# Patient Record
Sex: FEMALE | Race: WHITE | NOT HISPANIC OR LATINO | Employment: FULL TIME | URBAN - NONMETROPOLITAN AREA
[De-identification: names, ages, dates, MRNs, and addresses within clinical notes are randomized per-mention and may not be internally consistent; named-entity substitution may affect disease eponyms.]

---

## 2017-02-01 ENCOUNTER — OFFICE VISIT - GICH (OUTPATIENT)
Dept: FAMILY MEDICINE | Facility: OTHER | Age: 27
End: 2017-02-01

## 2017-02-01 ENCOUNTER — HISTORY (OUTPATIENT)
Dept: FAMILY MEDICINE | Facility: OTHER | Age: 27
End: 2017-02-01

## 2017-02-01 DIAGNOSIS — J06.9 ACUTE UPPER RESPIRATORY INFECTION: ICD-10-CM

## 2017-02-01 DIAGNOSIS — B97.89 OTHER VIRAL AGENTS AS THE CAUSE OF DISEASES CLASSIFIED ELSEWHERE: ICD-10-CM

## 2017-02-01 DIAGNOSIS — J02.9 ACUTE PHARYNGITIS: ICD-10-CM

## 2017-02-01 LAB — STREP A ANTIGEN - HISTORICAL: NEGATIVE

## 2017-06-19 ENCOUNTER — HISTORY (OUTPATIENT)
Dept: EMERGENCY MEDICINE | Facility: OTHER | Age: 27
End: 2017-06-19

## 2017-06-20 ENCOUNTER — AMBULATORY - GICH (OUTPATIENT)
Dept: SCHEDULING | Facility: OTHER | Age: 27
End: 2017-06-20

## 2017-06-23 ENCOUNTER — AMBULATORY - GICH (OUTPATIENT)
Dept: SCHEDULING | Facility: OTHER | Age: 27
End: 2017-06-23

## 2017-07-28 ENCOUNTER — PRENATAL OFFICE VISIT - GICH (OUTPATIENT)
Dept: OBGYN | Facility: OTHER | Age: 27
End: 2017-07-28

## 2017-07-28 ENCOUNTER — HISTORY (OUTPATIENT)
Dept: OBGYN | Facility: OTHER | Age: 27
End: 2017-07-28

## 2017-07-28 DIAGNOSIS — Z34.80 ENCOUNTER FOR SUPERVISION OF OTHER NORMAL PREGNANCY, UNSPECIFIED TRIMESTER: ICD-10-CM

## 2017-07-28 DIAGNOSIS — Z12.4 ENCOUNTER FOR SCREENING FOR MALIGNANT NEOPLASM OF CERVIX: ICD-10-CM

## 2017-09-01 ENCOUNTER — PRENATAL OFFICE VISIT - GICH (OUTPATIENT)
Dept: OBGYN | Facility: OTHER | Age: 27
End: 2017-09-01

## 2017-09-01 ENCOUNTER — HISTORY (OUTPATIENT)
Dept: OBGYN | Facility: OTHER | Age: 27
End: 2017-09-01

## 2017-09-01 DIAGNOSIS — Z34.80 ENCOUNTER FOR SUPERVISION OF OTHER NORMAL PREGNANCY, UNSPECIFIED TRIMESTER: ICD-10-CM

## 2017-09-01 DIAGNOSIS — K04.7 PERIAPICAL ABSCESS WITHOUT SINUS: ICD-10-CM

## 2017-09-14 ENCOUNTER — HOSPITAL ENCOUNTER (OUTPATIENT)
Dept: RADIOLOGY | Facility: OTHER | Age: 27
End: 2017-09-14
Attending: OBSTETRICS & GYNECOLOGY

## 2017-09-14 DIAGNOSIS — Z34.80 ENCOUNTER FOR SUPERVISION OF OTHER NORMAL PREGNANCY, UNSPECIFIED TRIMESTER: ICD-10-CM

## 2017-09-26 ENCOUNTER — PRENATAL OFFICE VISIT - GICH (OUTPATIENT)
Dept: OBGYN | Facility: OTHER | Age: 27
End: 2017-09-26

## 2017-09-26 ENCOUNTER — HISTORY (OUTPATIENT)
Dept: OBGYN | Facility: OTHER | Age: 27
End: 2017-09-26

## 2017-09-26 DIAGNOSIS — B00.9 HERPESVIRAL INFECTION: ICD-10-CM

## 2017-09-26 DIAGNOSIS — Z34.80 ENCOUNTER FOR SUPERVISION OF OTHER NORMAL PREGNANCY, UNSPECIFIED TRIMESTER: ICD-10-CM

## 2017-09-26 LAB
GLU GEST SCREEN 1HR 50G: 93 MG/DL (ref 65–139)
HEMOGLOBIN: 11.4 G/DL (ref 12–16)
MCV RBC AUTO: 86 FL (ref 80–100)

## 2017-09-27 ENCOUNTER — COMMUNICATION - GICH (OUTPATIENT)
Dept: OBGYN | Facility: OTHER | Age: 27
End: 2017-09-27

## 2017-09-27 LAB — TREPONEMA PALLIDUM - HISTORICAL: NEGATIVE

## 2017-10-17 ENCOUNTER — HISTORY (OUTPATIENT)
Dept: OBGYN | Facility: OTHER | Age: 27
End: 2017-10-17

## 2017-10-17 ENCOUNTER — PRENATAL OFFICE VISIT - GICH (OUTPATIENT)
Dept: OBGYN | Facility: OTHER | Age: 27
End: 2017-10-17

## 2017-10-17 DIAGNOSIS — Z34.82 ENCOUNTER FOR SUPERVISION OF OTHER NORMAL PREGNANCY, SECOND TRIMESTER: ICD-10-CM

## 2017-10-17 DIAGNOSIS — F41.9 ANXIETY DISORDER: ICD-10-CM

## 2017-11-10 ENCOUNTER — HISTORY (OUTPATIENT)
Dept: EMERGENCY MEDICINE | Facility: OTHER | Age: 27
End: 2017-11-10

## 2017-11-10 ENCOUNTER — COMMUNICATION - GICH (OUTPATIENT)
Dept: OBGYN | Facility: OTHER | Age: 27
End: 2017-11-10

## 2017-11-13 ENCOUNTER — COMMUNICATION - GICH (OUTPATIENT)
Dept: LAB | Facility: OTHER | Age: 27
End: 2017-11-13

## 2017-11-13 DIAGNOSIS — Z34.83 ENCOUNTER FOR SUPERVISION OF OTHER NORMAL PREGNANCY, THIRD TRIMESTER: ICD-10-CM

## 2017-11-21 ENCOUNTER — HISTORY (OUTPATIENT)
Dept: OBGYN | Facility: OTHER | Age: 27
End: 2017-11-21

## 2017-11-21 ENCOUNTER — AMBULATORY - GICH (OUTPATIENT)
Dept: LAB | Facility: OTHER | Age: 27
End: 2017-11-21

## 2017-11-21 ENCOUNTER — PRENATAL OFFICE VISIT - GICH (OUTPATIENT)
Dept: OBGYN | Facility: OTHER | Age: 27
End: 2017-11-21

## 2017-11-21 DIAGNOSIS — Z34.83 ENCOUNTER FOR SUPERVISION OF OTHER NORMAL PREGNANCY, THIRD TRIMESTER: ICD-10-CM

## 2017-11-21 LAB
GLU GEST SCREEN 1HR 50G: 105 MG/DL (ref 65–139)
HEMOGLOBIN: 11.1 G/DL (ref 12–16)
MCV RBC AUTO: 86 FL (ref 80–100)

## 2017-11-22 ENCOUNTER — COMMUNICATION - GICH (OUTPATIENT)
Dept: OBGYN | Facility: OTHER | Age: 27
End: 2017-11-22

## 2017-11-23 LAB — TREPONEMA PALLIDUM - HISTORICAL: NEGATIVE

## 2017-12-01 ENCOUNTER — HISTORY (OUTPATIENT)
Dept: EMERGENCY MEDICINE | Facility: OTHER | Age: 27
End: 2017-12-01

## 2017-12-07 ENCOUNTER — HISTORY (OUTPATIENT)
Dept: OBGYN | Facility: OTHER | Age: 27
End: 2017-12-07

## 2017-12-07 ENCOUNTER — PRENATAL OFFICE VISIT - GICH (OUTPATIENT)
Dept: OBGYN | Facility: OTHER | Age: 27
End: 2017-12-07

## 2017-12-07 DIAGNOSIS — N76.0 ACUTE VAGINITIS: ICD-10-CM

## 2017-12-07 DIAGNOSIS — B37.31 CANDIDIASIS OF VULVA AND VAGINA: ICD-10-CM

## 2017-12-07 DIAGNOSIS — B00.1 HERPESVIRAL VESICULAR DERMATITIS: ICD-10-CM

## 2017-12-07 DIAGNOSIS — B96.89 OTHER SPECIFIED BACTERIAL AGENTS AS THE CAUSE OF DISEASES CLASSIFIED ELSEWHERE: ICD-10-CM

## 2017-12-17 ENCOUNTER — HEALTH MAINTENANCE LETTER (OUTPATIENT)
Age: 27
End: 2017-12-17

## 2017-12-19 ENCOUNTER — HISTORY (OUTPATIENT)
Dept: OBGYN | Facility: OTHER | Age: 27
End: 2017-12-19

## 2017-12-19 ENCOUNTER — PRENATAL OFFICE VISIT - GICH (OUTPATIENT)
Dept: OBGYN | Facility: OTHER | Age: 27
End: 2017-12-19

## 2017-12-19 DIAGNOSIS — Z34.93 ENCOUNTER FOR SUPERVISION OF NORMAL PREGNANCY IN THIRD TRIMESTER: ICD-10-CM

## 2017-12-26 ENCOUNTER — PRENATAL OFFICE VISIT - GICH (OUTPATIENT)
Dept: OBGYN | Facility: OTHER | Age: 27
End: 2017-12-26

## 2017-12-26 ENCOUNTER — HISTORY (OUTPATIENT)
Dept: OBGYN | Facility: OTHER | Age: 27
End: 2017-12-26

## 2017-12-26 DIAGNOSIS — N89.8 OTHER SPECIFIED NONINFLAMMATORY DISORDERS OF VAGINA (CODE): ICD-10-CM

## 2017-12-26 DIAGNOSIS — Z34.83 ENCOUNTER FOR SUPERVISION OF OTHER NORMAL PREGNANCY, THIRD TRIMESTER: ICD-10-CM

## 2017-12-26 DIAGNOSIS — R21 RASH AND OTHER NONSPECIFIC SKIN ERUPTION: ICD-10-CM

## 2017-12-27 ENCOUNTER — COMMUNICATION - GICH (OUTPATIENT)
Dept: OBGYN | Facility: OTHER | Age: 27
End: 2017-12-27

## 2017-12-27 NOTE — PROGRESS NOTES
Patient Information     Patient Name MRN Sex     Alexy Carrion [id number] Female 1990      Progress Notes by Bobby Leung MD at 2017  9:45 AM     Author:  Bobby Leung MD Service:  (none) Author Type:  Physician     Filed:  2017 12:35 PM Encounter Date:  2017 Status:  Signed     :  Bobby Leung MD (Physician)            PRENATAL VISIT   FIRST OBSTETRICAL EXAM - OB     Alexy Carrion is a 26 y.o. female, G 4, P 3004, is here today for her First Obstetrical Exam. Ethnicity: /White                      OB History                    Para  Term     AB  Living     4   3  3         4     SAB   TAB  Ectopic  Multiple   Live Births                 1   4                         # Outcome Date  GA  Lbr Jin/2nd  Weight Sex  Delivery Anes PTL Lv   4 Current                         3A Term 14  37w1d       M  Vag EPIDURAL   SHRUTI   3B Term 14  37w1d       F  Vag-Breech EPIDURAL   SHRUTI   2 Term 10/2009     01:00  3.289 kg (7 lb 4 oz) M  Vag     SHRUTI   1 Term 2008     03:00  4.082 kg (9 lb) F  Vag     SHRUTI                           18w2d    OB Problem List:   x 3  Psychosocial issues    A Rh Positive  Flu Shot:tbd  GCT:tbd  Tdap:tbd  Syphilis: neg  GBS:tbd    Immunization History     Administered  Date(s) Administered     AMB Influenza, IIV3 (Age >=3 years)(Flu Clinic Only) 2013     Hepatitis B, Unspecified 10/03/2001     MMR, Unspecified 1991, 10/29/1999     Polio Virus, Unspecified 1989, 1990, 1991, 1995     Tdap 2014     Tdap, Unspecified 1990     Varicella Vaccine 2001       CC: Recheck OB visit at 18w2d    HPI: Alexy Carrion presents for a routine OB visit now at 18w2d  She has no concerns. Active baby. Denies cramping, bleeding, normal fetal movement    Current Outpatient Prescriptions on File Prior to Visit       Medication  Sig Dispense Refill     EPIPEN 2-GUNNAR 0.3 mg/0.3 mL (1:1,000) injection  INJECT 0.3 MG INTO THE MUSCLE ONE TIME IF NEEDED FOR ALLERGIC REACTION FOR UPTO ONE DOSE. 2 Each 0     meclizine (ANTIVERT) 12.5 mg tablet Take 1-2 tablets every 8 hours as needed for nausea. 50 tablet 0     multivit with min-folic acid (DAILY GUMMIES) 200 mcg chew Take  by mouth.  0     pyridoxine, vitamin B6, (VITAMIN B6) 100 mg tablet Take 1 tablet by mouth once daily. 30 tablet 0     No current facility-administered medications on file prior to visit.      REVIEW OF SYSTEMS:  Social History Narrative    No smoking.  One daughter and one son;  New significant other Smith    Preload  8/26/2013                  No family history on file.    O:   /64  Pulse 76  Wt 110.5 kg (243 lb 9.6 oz)  LMP 05/04/2017  BMI 37.04 kg/m2  Body mass index is 37.04 kg/(m^2).    See OB flow sheet   EXAM:  General Appearance: Pleasant, alert, appropriate appearance for age. No acute distress    Results for orders placed or performed in visit on 07/28/17      GC CHLAMYDIA TRACH PROBE      Result  Value Ref Range    CHLAMYDIA PCR NOT Detected NOT Detected, Invalid    N GONORRHOEAE PCR NOT Detected NOT Detected, Invalid   GYN THIN PREP PAP SCREEN IMAGED      Result  Value Ref Range    CYTOLOGY           A: Nathanra M Persons at 18w2d    P: 1. Supervision of other normal pregnancy    - US OB BASIC FETAL ANATOMY SCREEN SINGLE TA; Future    2. Tooth abscess    - amoxicillin-clavulanate 875-125 mg tablet (AUGMENTIN); Take 1 tablet by mouth 2 times daily with meals for 14 days.  Dispense: 28 tablet; Refill: 0         Bobby Leung MD FACOG  12:34 PM 9/1/2017

## 2017-12-27 NOTE — PROGRESS NOTES
Patient Information     Patient Name MRN Sex     Alexy Carrion [id number] Female 1990      Progress Notes by Susan Zarco R.T. ([facility]) at 2017  3:57 PM     Author:  Sus[facility]T. [facility] Service:  (none) Author Type:  [employer] - Registered Radiologic Technologist     Filed:  2017  3:57 PM Date of Service:  2017  3:57 PM Status:  Signed     :  Susan Zarco R.T. (ARRT) ([facility] - Registered Radiologic Technologist)            Falls Risk Criteria:    Age 65 and older or under age 4        Sensory deficits    Poor vision    Use of ambulatory aides    Impaired judgment    Unable to walk independently    Meets High Risk criteria for falls:  no

## 2017-12-27 NOTE — PROGRESS NOTES
Patient Information     Patient Name MRN Sex     Alexy Carrion [id number] Female 1990      Progress Notes by Bobby Leung MD at 2017  9:15 AM     Author:  Bobby Leung MD Service:  (none) Author Type:  Physician     Filed:  2017 10:00 AM Encounter Date:  2017 Status:  Signed     :  Bobby Leung MD (Physician)            PRENATAL VISIT   FIRST OBSTETRICAL EXAM - OB      Alexy Carrion is a 26 y.o. female, G 4, P 3004, is here today for her First Obstetrical Exam. Ethnicity: /White                                      OB History                    Para  Term     AB  Living     4   3  3           4     SAB   TAB  Ectopic  Multiple   Live Births                    1   4                          # Outcome Date  GA  Lbr Jin/2nd  Weight Sex  Delivery Anes PTL Lv   4 Current                                  3A Term 14  37w1d         M  Vag EPIDURAL    SHRUTI   3B Term 14  37w1d         F  Vag-Breech EPIDURAL    SHRUTI   2 Term 10/2009      01:00  3.289 kg (7 lb 4 oz) M  Vag       SHRUTI   1 Term 2008      03:00  4.082 kg (9 lb) F  Vag       SHRUTI                              18w2d     OB Problem List:   x 3  Psychosocial issues     A Rh Positive  Flu Shot:tbd  GCT:tbd  Tdap:tbd  Syphilis: neg  GBS:tbd    21w6d    Tooth pain has responded to augmentin, no other concerns.  Denies bleeding, leaking, cramps.    GCT today    Recheck in one month.  Repeat GCT at 28 weeks.  Tdap then, declines flu shot.    Bobby Leung MD FACOG  9:56 AM 2017
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
## 2017-12-28 NOTE — TELEPHONE ENCOUNTER
Patient Information     Patient Name MRN Sex     Alexy Carrion [id number] Female 1990      Telephone Encounter by Vijay Garcia LPN at 10/25/2017  3:20 PM     Author:  Vijay Garcia LPN Service:  (none) Author Type:  NURS- Licensed Practical Nurse     Filed:  10/25/2017  3:20 PM Encounter Date:  2017 Status:  Signed     :  Vijay Garcia LPN (NURS- Licensed Practical Nurse)            Patient has been seen in clinic since telephone call. Will sign encounter.  Vijay Garcia LPN ..............10/25/2017 3:20 PM

## 2017-12-28 NOTE — PROGRESS NOTES
"Patient Information     Patient Name MRN Sex     Alexy Carrion [id number] Female 1990      Progress Notes by Bobby Leung MD at 2017  2:30 PM     Author:  Bobby Leung MD Service:  (none) Author Type:  Physician     Filed:  2017  2:38 PM Encounter Date:  2017 Status:  Signed     :  Bobby Leung MD (Physician)            [facility]  [facility] Visit    Problem List:  Estimated Date of Delivery: 18    OB Problem List:   x 3  Psychosocial issues    OB History                    Para  Term     AB  Living     4   3  3       4     SAB   TAB  Ectopic  Multiple   Live Births              1   4                        # Outcome Date  GA  Lbr Jin/2nd  Weight Sex  Delivery Anes PTL Lv   4 Current                3A Term 14  37w1d     M  Vag EPIDURAL  SHRUTI   3B Term 14  37w1d     F  Vag-Breech EPIDURAL  SHRUTI   2 Term 10/2009    01:00  3.289 kg (7 lb 4 oz) M  Vag   SHRUTI   1 Term 2008    03:00  4.082 kg (9 lb) F  Vag   SHRUTI                      Immunization History     Administered  Date(s) Administered     AMB Influenza, IIV3 (Age >=3 years)(Flu Clinic Only) 2013     Hepatitis B, Unspecified 10/03/2001     Influenza, IIV4 10/17/2017     MMR, Unspecified 1991, 10/29/1999     Polio Virus, Unspecified 1989, 1990, 1991, 1995     Tdap 2014     Tdap, Unspecified 1990     Varicella Vaccine 2001       A Rh Positive  Rubella:immune  28 week labs:  Tdap 2017  Flu: done  GBS:*    S: Patient reports she has been feeling tire. She denies cramping, contractions, vaginal bleeding, leaking fluid. She reports normal fetal movement. She has no other complaints.    O: /66  Pulse 76  Ht 1.727 m (5' 8\") Comment: patient report  Wt 111.1 kg (245 lb)  LMP 2017  BMI 37.25 kg/m2  See flowsheet    Gen: Well-appearing, NAD    Fundal Height:  30  FHR: 150    A/P:  Alexy Carrion is a 27 y.o. "  at 29w6d, here for return OB visit.    RTC two weeks    Bobby Leung MD FACOG  2:22 PM 2017

## 2017-12-28 NOTE — TELEPHONE ENCOUNTER
Patient Information     Patient Name MRN Sex     Alexy Carrion [id number] Female 1990      Telephone Encounter by Susan Hardwick at 2017 10:55 AM     Author:  Susan Hardwick Service:  (none) Author Type:  (none)     Filed:  2017 10:56 AM Encounter Date:  2017 Status:  Signed     :  Susan Hardwick            Patient is coming for her one hour. Please place orders.

## 2017-12-28 NOTE — PROGRESS NOTES
Patient Information     Patient Name MRN Sex     Alexy Carrion [id number] Female 1990      Progress Notes by Bobby Leung MD at 10/17/2017 11:30 AM     Author:  Bobby Leung MD Service:  (none) Author Type:  Physician     Filed:  10/17/2017 11:57 AM Encounter Date:  10/17/2017 Status:  Signed     :  Bobby Leung MD (Physician)            PRENATAL VISIT   FIRST OBSTETRICAL EXAM - OB      Alexy Carrion is a 26 y.o. female, G 4, P 3004, is here today for her First Obstetrical Exam. Ethnicity: /White                                      OB History                    Para  Term     AB  Living     4   3  3           4     SAB   TAB  Ectopic  Multiple   Live Births                    1   4                          # Outcome Date  GA  Lbr Jin/2nd  Weight Sex  Delivery Anes PTL Lv   4 Current                                  3A Term 14  37w1d         M  Vag EPIDURAL    SRHUTI   3B Term 14  37w1d         F  Vag-Breech EPIDURAL    SHRUTI   2 Term 10/2009      01:00  3.289 kg (7 lb 4 oz) M  Vag       SHRUTI   1 Term 2008      03:00  4.082 kg (9 lb) F  Vag       SHRUTI                           OB Problem List:   x 3  Psychosocial issues      [facility]  Return OB Visit    A Rh Positive  Rubella immune  28 week labs: re-screen at 28 weeks  Tdap*  Flu: 10/17/2017  GBS:*    S: Patient reports she has been feeling OK. She declines cramping, contractions, vaginal bleeding, leaking fluid. She reports normal fetal movement. She has struggled with anxiety and short temper lately.  She would like a med for this. She has history of depression in postpartum period and as a teen.    PHQ Depression Screen  Date of PHQ exam: 10/17/17  Over the last 2 weeks, how often have you been bothered by any of the following problems?  1. Little interest or pleasure in doing things: 0 - Not at all  2. Feeling down, depressed, or hopeless: 0 - Not at all     O: /72  Pulse 78  Wt  110.9 kg (244 lb 9.6 oz)  LMP 2017  BMI 37.19 kg/m2  See flowsheet    Fundal Height:  27  FHR: 145    A/P:  Alexy Carrion is a 27 y.o.  at 24w6d, here for return OB visit.      Recheck in one month.  Repeat GCT at 28 weeks.  Tdap then, flu shot today  Will start low dose celexa, watching for side effects, recheck in one month    Bobby Leung MD FACOG  9:56 AM 2017

## 2017-12-28 NOTE — TELEPHONE ENCOUNTER
Patient Information     Patient Name MRN Sex     Alexy Carrion [id number] Female 1990      Telephone Encounter by Nadine Carbajal at 11/10/2017  1:14 PM     Author:  Nadine Carbajal Service:  (none) Author Type:  (none)     Filed:  11/10/2017  1:15 PM Encounter Date:  11/10/2017 Status:  Signed     :  Nadine Carbajal            Patient presented to ER today.  Attempted to call x2  Leann Carbajal LPN ....................  11/10/2017   1:14 PM

## 2017-12-28 NOTE — TELEPHONE ENCOUNTER
Patient Information     Patient Name MRN Sex     Alexy Carrion [id number] Female 1990      Telephone Encounter by Vijay Garcia LPN at 2017  9:25 AM     Author:  Vijay Garcia LPN Service:  (none) Author Type:  NURS- Licensed Practical Nurse     Filed:  2017  9:25 AM Encounter Date:  2017 Status:  Signed     :  Vijay Garcia LPN (NURS- Licensed Practical Nurse)            Left message to call back  ...................Vijay Garcia LPN....2017 9:25 AM

## 2017-12-28 NOTE — TELEPHONE ENCOUNTER
Patient Information     Patient Name MRN Sex Alexy Doyle [id number] Female 1990      Telephone Encounter by Priscilla Russo RN at 2017 11:00 AM     Author:  Priscilla Russo RN Service:  (none) Author Type:  NURS- Registered Nurse     Filed:  2017 11:01 AM Encounter Date:  2017 Status:  Signed     :  Priscilla Russo RN (NURS- Registered Nurse)            Patient has lab only appointment on 2017 for 28 week labs. Please review pended orders.  Priscilla Russo RN............. 2017 11:01 AM

## 2017-12-28 NOTE — TELEPHONE ENCOUNTER
Patient Information     Patient Name MRN Sex     Alexy Carrion [id number] Female 1990      Telephone Encounter by Ellie Leavitt at 11/10/2017 10:15 AM     Author:  Ellie Leavitt Service:  (none) Author Type:  (none)     Filed:  11/10/2017 10:17 AM Encounter Date:  11/10/2017 Status:  Signed     :  Ellie Leavitt            Patient called and stated that she feels that she has an infection or possible a UTI, she is wondering if [facility] would be willing to work her in for an appointment today.  She is wanting a call back in regards to this.   Thank you!     Ellie Leavitt ....................  11/10/2017   10:16 AM

## 2017-12-28 NOTE — TELEPHONE ENCOUNTER
Patient Information     Patient Name MRN Sex Alexy Doyle [id number] Female 1990      Telephone Encounter by Vijay Garcia LPN at 2017  9:25 AM     Author:  Vijay Garcia LPN Service:  (none) Author Type:  NURS- Licensed Practical Nurse     Filed:  2017  9:25 AM Encounter Date:  2017 Status:  Signed     :  Vijay Garcia LPN (NURS- Licensed Practical Nurse)            ----- Message from Bobby Leung MD sent at 2017  5:03 PM CDT -----  She passes her glucose screen

## 2017-12-28 NOTE — TELEPHONE ENCOUNTER
Patient Information     Patient Name MRN Sex Alexy Doyle [id number] Female 1990      Telephone Encounter by Lunba Powers at 2017  8:27 AM     Author:  Lubna Powers Service:  (none) Author Type:  (none)     Filed:  2017  8:27 AM Encounter Date:  2017 Status:  Signed     :  Lubna Powers            ----- Message from Bobby Leung MD sent at 2017  5:02 PM CST -----  Normal sugar test.

## 2017-12-28 NOTE — TELEPHONE ENCOUNTER
Patient Information     Patient Name MRN Sex Alexy Doyle [id number] Female 1990      Telephone Encounter by Nadine Carbajal at 11/10/2017 11:42 AM     Author:  Nadine Carbajal Service:  (none) Author Type:  (none)     Filed:  11/10/2017 11:43 AM Encounter Date:  11/10/2017 Status:  Signed     :  Nadine Carbajal            Left message to call back.  Leann Carbajal LPN ....................  11/10/2017   11:43 AM

## 2017-12-29 LAB — CULTURE - HISTORICAL: NORMAL

## 2017-12-30 NOTE — NURSING NOTE
Patient Information     Patient Name MRN Sex Alexy Doyle [id number] Female 1990      Nursing Note by Priscilla Russo RN at 2017  2:30 PM     Author:  Priscilla Russo RN Service:  (none) Author Type:  NURS- Registered Nurse     Filed:  2017  2:22 PM Encounter Date:  2017 Status:  Signed     :  Priscilla Russo RN (NURS- Registered Nurse)            Patient is here for routine OB care - no concerns noted.  Priscilla Russo RN............. 2017 2:08 PM

## 2017-12-30 NOTE — NURSING NOTE
Patient Information     Patient Name MRN Sex     Alexy Carrion [id number] Female 1990      Nursing Note by Nadine Carbajal at 2017  2:30 PM     Author:  Nadine Carbajal Service:  (none) Author Type:  (none)     Filed:  2017  2:38 PM Encounter Date:  2017 Status:  Signed     :  Nadine Carbajal            Patient presents for routine OB care currently at 29w6d. Patient was offered the breastfeeding booklet, La Leche Letanesha flyer, Operational Delivery consent for review. 2 Babystep coupons given.     Leann Carbajal LPN............. 2017 2:28 PM

## 2018-01-02 ENCOUNTER — PRENATAL OFFICE VISIT - GICH (OUTPATIENT)
Dept: OBGYN | Facility: OTHER | Age: 28
End: 2018-01-02

## 2018-01-02 ENCOUNTER — HISTORY (OUTPATIENT)
Dept: OBGYN | Facility: OTHER | Age: 28
End: 2018-01-02

## 2018-01-02 DIAGNOSIS — L29.9 PRURITUS: ICD-10-CM

## 2018-01-02 DIAGNOSIS — O26.893 OTHER SPECIFIED PREGNANCY RELATED CONDITIONS, THIRD TRIMESTER: ICD-10-CM

## 2018-01-02 DIAGNOSIS — N89.8 OTHER SPECIFIED NONINFLAMMATORY DISORDERS OF VAGINA (CODE): ICD-10-CM

## 2018-01-02 DIAGNOSIS — R11.0 NAUSEA: ICD-10-CM

## 2018-01-02 LAB
A/G RATIO - HISTORICAL: 0.9 (ref 1–2)
ALBUMIN SERPL-MCNC: 3.3 G/DL (ref 3.5–5.7)
ALP SERPL-CCNC: 197 IU/L (ref 34–104)
ALT (SGPT) - HISTORICAL: 33 IU/L (ref 7–52)
AMNISURE: NORMAL
AST SERPL-CCNC: 36 IU/L (ref 13–39)
BILIRUB SERPL-MCNC: 0.8 MG/DL (ref 0.3–1)
BILIRUBIN, DIRECT: 0.43 MG/DL (ref 0.03–0.18)
BILIRUBIN,INDIRECT: 0.4 MG/DL (ref 0.2–0.8)
GLOBULIN - HISTORICAL: 3.5 G/DL (ref 2–3.7)
PROT SERPL-MCNC: 6.8 G/DL (ref 6.4–8.9)

## 2018-01-03 ENCOUNTER — COMMUNICATION - GICH (OUTPATIENT)
Dept: OBGYN | Facility: OTHER | Age: 28
End: 2018-01-03

## 2018-01-03 NOTE — PROGRESS NOTES
Patient Information     Patient Name MRN Sex     Alexy Carrion [id number] Female 1990      Progress Notes by Sheree Cedillo NP at 2017  6:00 PM     Author:  Sheree Cedillo NP Service:  (none) Author Type:  PHYS- Nurse Practitioner     Filed:  2017  6:34 PM Encounter Date:  2017 Status:  Signed     :  Sheree Cedillo NP (PHYS- Nurse Practitioner)            HPI:    Alexy Carrion is a 26 y.o. female who presents to clinic today for strep testing.  Patient requesting strep testing.  Sore throat started yesterday and worsening today.  Complaints of sore throat with deep breath and burning sensation in throat.  Cough started this morning.  Dry cough.  No fevers.  Mild runny nose.  No headaches.  No body aches.  Appetite normal.  Denies any reflux or GERD symptoms.  Energy normal.  Using cough drops.            Past Medical History     Diagnosis  Date     History of pregnancy      Past Surgical History       Procedure   Laterality Date     Orif radius fx        age 4       Social History       Substance Use Topics         Smoking status:   Never Smoker     Smokeless tobacco:   Never Used     Alcohol use   0.0 oz/week     0 Standard drinks or equivalent per week        Comment: rarely      Current Outpatient Prescriptions       Medication  Sig Dispense Refill     EPIPEN 2-GUNNAR 0.3 mg/0.3 mL (1:1,000) injection INJECT 0.3 MG INTO THE MUSCLE ONE TIME IF NEEDED FOR ALLERGIC REACTION FOR UPTO ONE DOSE. 2 Each 0     No current facility-administered medications for this visit.      Medications have been reviewed by me and are current to the best of my knowledge and ability.    Allergies      Allergen   Reactions     Bee Sting [Hymenoptera Allergenic Extract]  Anaphylaxis     Aspirin  Angioedema     Swelling of throat and nausea      Doxycycline  Stomach Upset       ROS:  Refer to HPI    Visit Vitals       BP 98/62     Pulse 90     Temp 97.6  F (36.4  C) (Tympanic)     Resp 16     Ht 1.753  "m (5' 9\")     Wt 102.5 kg (226 lb)     LMP 01/19/2017 (Approximate)     SpO2 99%     Breastfeeding No     BMI 33.37 kg/m2       EXAM:  General Appearance: Well appearing adult female, appropriate appearance for age. No acute distress  Head: normocephalic, atraumatic  Ears: Left TM with bony landmarks appreciated, no erythema, no effusion, no bulging, no purulence.  Right TM with bony landmarks appreciated, no erythema, no effusion, no bulging, no purulence.   Left auditory canal clear.  Right auditory canal clear.  Normal external ears, non tender.  Eyes: conjunctivae normal, no drainage  Orophayrnx: moist mucous membranes, posterior pharynx without erythema, tonsils with 1-2+ hypertrophy, no erythema, no exudates or petechiae, no post nasal drip seen.    Neck: supple without adenopathy  Respiratory: normal chest wall and respirations.  Normal effort.  Clear to auscultation bilaterally, no wheezes or rhonchi or congestion, no cough appreciated, oxygen saturation 99%  Cardiac: RRR with no murmurs  Psychological: normal affect, alert and pleasant    Labs:  Results for orders placed or performed in visit on 02/01/17      THROAT RAPID STREP A WITH REFLEX      Result  Value Ref Range    STREP A ANTIGEN           Negative Negative           ASSESSMENT/PLAN:    ICD-10-CM    1. Sore throat J02.9 THROAT RAPID STREP A WITH REFLEX      THROAT RAPID STREP A WITH REFLEX   2. Viral URI with cough J06.9      B97.89          Negative rapid strep test  Viral illness  No antibiotics indicated at this time  Encouraged fluids  Symptomatic treatment - salt water gargles, honey, elevation, humidifier, sinus rinse/netti pot, lozenges, etc   Tylenol or ibuprofen PRN  Follow up if symptoms persist or worsen or concerns        Patient Instructions   Negative rapid strep test    Symptoms likely due to virus. No antibiotic is needed at this time.     Symptoms typically worse on days 3-4 and then begin improving each day. If symptoms begin " worsening or fail to improve after 2 weeks, return to clinic for reevaluation.     Encouraged fluids and rest.    May use symptomatic care with tylenol or ibuprofen.     Using a humidifier works well to break up the congestion.     Elevate the mattress to 15 degrees in order to help with the congestion.    Frequent swallows of cool liquid.      Oatmeal or honey coats the throat and some patients find it soothes the pain.     Salt water gargles      Return to clinic with change/worsening of symptoms or concerns.       Index [place] Related topics   Colds   What are colds?   Colds are an infection of the head and chest caused by a virus. They are a type of upper respiratory infection (URI). They can affect your nose, throat, sinuses, eyes, and ears. A cold can also affect the tube that connects your middle ear and throat, as well as your windpipe, voice box, and the airways in your lungs.  What is the cause?  Over 200 different viruses can cause colds. The infection spreads when viruses are passed to others by sneezing, coughing, or touching. You may also become infected by handling objects that were touched by someone with a cold. Some of the cold viruses live up to 3 hours on the skin and on objects, such as doorknobs or telephones.  You are more likely to get a cold if:    You are stressed.    You are tired.    You do not eat a healthy diet.    You are a smoker or are exposed to secondhand smoke.    You are living or working in crowded conditions.    You don t wash your hands often.  People tend to get fewer colds as they get older because they have already had many colds and their immune system is able to fight off some of the viruses that can cause colds.  What are the symptoms?   You usually start having cold symptoms 1 to 3 days after contact with a cold virus. Symptoms may include:    Scratchy or sore throat    Sneezing    Runny or stuffy nose    Cough    Watery eyes    Ears that feel stuffy or  blocked    Slight fever (99 to 100 F, or 37.2 to 37.8 C)    Feeling tired    Headache    Loss of appetite  Cold and flu symptoms are similar. The difference is that when you have the flu, the symptoms start within a few hours. The symptoms of a cold develop more slowly.  How are they treated?   Most of the time you don t need to see your healthcare provider for treatment. There are no medicines that cure a cold. Medicines that you can buy at most drugstores can help relieve your symptoms. You can:    Get lots of rest.    Drink extra fluids, such as water, fruit juice, and tea.    Use a humidifier to put more moisture in the air. Avoid steam vaporizers because they can cause burns. Be sure to keep the humidifier clean, as recommended in the 's instructions. It's important to keep bacteria and mold from growing in the water container.    Take nonprescription medicine, such as acetaminophen, ibuprofen, or naproxen to treat pain and fever. Read the label and take as directed. Unless recommended by your healthcare provider, you should not take these medicines for more than 10 days.    Nonsteroidal anti-inflammatory medicines (NSAIDs), such as ibuprofen, naproxen, and aspirin, may cause stomach bleeding and other problems. These risks increase with age.    Acetaminophen may cause liver damage or other problems. Unless recommended by your provider, don't take more than 3000 milligrams (mg) in 24 hours. To make sure you don t take too much, check other medicines you take to see if they also contain acetaminophen. Ask your provider if you need to avoid drinking alcohol while taking this medicine.    Decongestants pills or nasal spray may reduce swelling in your nose and sinuses and lessen the amount of mucus. Use decongestants as directed. If you are using a nonprescription nasal-spray decongestant, generally you should not use it for more than 3 days. After 3 days it may make your symptoms worse. Ask your  "healthcare provider if it is OK for you to use a nasal spray decongestant longer than this.    Use cough drops, pain relievers, or salt water gargles for a sore throat. You can make a salt water gargle with 1 teaspoon table salt in 1 cup (8 ounces) of warm water.    You can buy many different medicines for coughs without a prescription. However, there is no proof that they will actually help your cough. Cough medicines may cause harm to young children, but they are generally safe for older children and adults.    If you need relief from a dry, hacking cough, choose a medicine labeled \"cough suppressant.\" A cough suppressant may help you cough less and sleep better. Cough medicines with the initials DM in the name contain the suppressant drug called dextromethorphan.    If you need to loosen mucus, choose a medicine labeled \"expectorant.\" Expectorants may help keep your mucus thin and bring it up from the lungs when you cough. This may relieve chest congestion and make it easier to breathe. The drug used most often as an expectorant is guaifenesin.    Do not give a child under age 4 any cough and cold medicines unless you have instructions from your healthcare provider. Children over 6 years of age may be given cough drops or hard candies to relieve a sore throat or cough.    If you are pregnant, check first with your healthcare provider before taking any cold or cough medicines.  Colds usually last 1 to 2 weeks. Contact your healthcare provider if you have new or worsening symptoms or your symptoms last longer than 2 weeks.  How can I help prevent colds?  If you are sick, you can help protect others if you:    Don t go to work or school. Avoid contact with other people except to get medical care.    Cover your nose and mouth with a tissue when you cough or sneeze. Throw the tissue in the trash after you use it, and then wash your hands. If you don t have a tissue, cough or sneeze into your upper sleeve instead of " your hands.    Clean your hands often with soap and water or an alcohol-based hand , especially after using tissues or coughing or sneezing into your hands.  To lower your risk of catching a cold:    Wash your hands often and especially after using the restroom, coughing, sneezing, or blowing your nose. Also wash your hands before eating or touching your eyes.    Stay at least 6 feet away from people who are sick, if you can.    Keep surfaces clean--especially bedside tables, surfaces in the bathroom, and toys for children. Some viruses and bacteria can live 2 hours or more on surfaces like cafeteria tables, doorknobs, and desks. Wipe them down with a household disinfectant according to directions on the label.    Take care of your health. Try to get at least 7 to 9 hours of sleep each night. Eat a healthy diet and try to keep a healthy weight. If you smoke, try to quit. If you want to drink alcohol, ask your healthcare provider how much is safe for you to drink. Learn ways to manage stress. Exercise according to your healthcare provider's instructions.  Developed by [employer].  Adult Advisor 2016.2 published by [employer].  Last modified: 2014-10-21  Last reviewed: 2014-09-04  This content is reviewed periodically and is subject to change as new health information becomes available. The information is intended to inform and educate and is not a replacement for medical evaluation, advice, diagnosis or treatment by a healthcare professional.  References   Adult Advisor 2016.2 Index    Copyright   2016 [employer], a division of [employer] All rights reserved.

## 2018-01-03 NOTE — PATIENT INSTRUCTIONS
Patient Information     Patient Name MRN Sex     Alexy Carrion [id number] Female 1990      Patient Instructions by Sheree Cedillo NP at 2017  6:22 PM     Author:  Sheree Cedillo NP  Service:  (none) Author Type:  PHYS- Nurse Practitioner     Filed:  2017  6:23 PM  Encounter Date:  2017 Status:  Addendum     :  Sheree Cedillo NP (PHYS- Nurse Practitioner)        Related Notes: Original Note by Sheree Cedillo NP (PHYS- Nurse Practitioner) filed at 2017  6:22 PM            Negative rapid strep test    Symptoms likely due to virus. No antibiotic is needed at this time.     Symptoms typically worse on days 3-4 and then begin improving each day. If symptoms begin worsening or fail to improve after 2 weeks, return to clinic for reevaluation.     Encouraged fluids and rest.    May use symptomatic care with tylenol or ibuprofen.     Using a humidifier works well to break up the congestion.     Elevate the mattress to 15 degrees in order to help with the congestion.    Frequent swallows of cool liquid.      Oatmeal or honey coats the throat and some patients find it soothes the pain.     Salt water gargles      Return to clinic with change/worsening of symptoms or concerns.       Index [place] Related topics   Colds   What are colds?   Colds are an infection of the head and chest caused by a virus. They are a type of upper respiratory infection (URI). They can affect your nose, throat, sinuses, eyes, and ears. A cold can also affect the tube that connects your middle ear and throat, as well as your windpipe, voice box, and the airways in your lungs.  What is the cause?  Over 200 different viruses can cause colds. The infection spreads when viruses are passed to others by sneezing, coughing, or touching. You may also become infected by handling objects that were touched by someone with a cold. Some of the cold viruses live up to 3 hours on the skin and on objects, such as  doorknobs or telephones.  You are more likely to get a cold if:    You are stressed.    You are tired.    You do not eat a healthy diet.    You are a smoker or are exposed to secondhand smoke.    You are living or working in crowded conditions.    You don t wash your hands often.  People tend to get fewer colds as they get older because they have already had many colds and their immune system is able to fight off some of the viruses that can cause colds.  What are the symptoms?   You usually start having cold symptoms 1 to 3 days after contact with a cold virus. Symptoms may include:    Scratchy or sore throat    Sneezing    Runny or stuffy nose    Cough    Watery eyes    Ears that feel stuffy or blocked    Slight fever (99 to 100 F, or 37.2 to 37.8 C)    Feeling tired    Headache    Loss of appetite  Cold and flu symptoms are similar. The difference is that when you have the flu, the symptoms start within a few hours. The symptoms of a cold develop more slowly.  How are they treated?   Most of the time you don t need to see your healthcare provider for treatment. There are no medicines that cure a cold. Medicines that you can buy at most drugstores can help relieve your symptoms. You can:    Get lots of rest.    Drink extra fluids, such as water, fruit juice, and tea.    Use a humidifier to put more moisture in the air. Avoid steam vaporizers because they can cause burns. Be sure to keep the humidifier clean, as recommended in the 's instructions. It's important to keep bacteria and mold from growing in the water container.    Take nonprescription medicine, such as acetaminophen, ibuprofen, or naproxen to treat pain and fever. Read the label and take as directed. Unless recommended by your healthcare provider, you should not take these medicines for more than 10 days.    Nonsteroidal anti-inflammatory medicines (NSAIDs), such as ibuprofen, naproxen, and aspirin, may cause stomach bleeding and other  "problems. These risks increase with age.    Acetaminophen may cause liver damage or other problems. Unless recommended by your provider, don't take more than 3000 milligrams (mg) in 24 hours. To make sure you don t take too much, check other medicines you take to see if they also contain acetaminophen. Ask your provider if you need to avoid drinking alcohol while taking this medicine.    Decongestants pills or nasal spray may reduce swelling in your nose and sinuses and lessen the amount of mucus. Use decongestants as directed. If you are using a nonprescription nasal-spray decongestant, generally you should not use it for more than 3 days. After 3 days it may make your symptoms worse. Ask your healthcare provider if it is OK for you to use a nasal spray decongestant longer than this.    Use cough drops, pain relievers, or salt water gargles for a sore throat. You can make a salt water gargle with 1 teaspoon table salt in 1 cup (8 ounces) of warm water.    You can buy many different medicines for coughs without a prescription. However, there is no proof that they will actually help your cough. Cough medicines may cause harm to young children, but they are generally safe for older children and adults.    If you need relief from a dry, hacking cough, choose a medicine labeled \"cough suppressant.\" A cough suppressant may help you cough less and sleep better. Cough medicines with the initials DM in the name contain the suppressant drug called dextromethorphan.    If you need to loosen mucus, choose a medicine labeled \"expectorant.\" Expectorants may help keep your mucus thin and bring it up from the lungs when you cough. This may relieve chest congestion and make it easier to breathe. The drug used most often as an expectorant is guaifenesin.    Do not give a child under age 4 any cough and cold medicines unless you have instructions from your healthcare provider. Children over 6 years of age may be given cough drops or " hard candies to relieve a sore throat or cough.    If you are pregnant, check first with your healthcare provider before taking any cold or cough medicines.  Colds usually last 1 to 2 weeks. Contact your healthcare provider if you have new or worsening symptoms or your symptoms last longer than 2 weeks.  How can I help prevent colds?  If you are sick, you can help protect others if you:    Don t go to work or school. Avoid contact with other people except to get medical care.    Cover your nose and mouth with a tissue when you cough or sneeze. Throw the tissue in the trash after you use it, and then wash your hands. If you don t have a tissue, cough or sneeze into your upper sleeve instead of your hands.    Clean your hands often with soap and water or an alcohol-based hand , especially after using tissues or coughing or sneezing into your hands.  To lower your risk of catching a cold:    Wash your hands often and especially after using the restroom, coughing, sneezing, or blowing your nose. Also wash your hands before eating or touching your eyes.    Stay at least 6 feet away from people who are sick, if you can.    Keep surfaces clean--especially bedside tables, surfaces in the bathroom, and toys for children. Some viruses and bacteria can live 2 hours or more on surfaces like cafeteria tables, doorknobs, and desks. Wipe them down with a household disinfectant according to directions on the label.    Take care of your health. Try to get at least 7 to 9 hours of sleep each night. Eat a healthy diet and try to keep a healthy weight. If you smoke, try to quit. If you want to drink alcohol, ask your healthcare provider how much is safe for you to drink. Learn ways to manage stress. Exercise according to your healthcare provider's instructions.  Developed by [employer].  Adult Advisor 2016.2 published by [employer].  Last modified: 2014-10-21  Last reviewed: 2014-09-04  This content is reviewed periodically  and is subject to change as new health information becomes available. The information is intended to inform and educate and is not a replacement for medical evaluation, advice, diagnosis or treatment by a healthcare professional.  References   Adult Advisor 2016.2 Index    Copyright   2016 [employer], a division of [employer] All rights reserved.

## 2018-01-03 NOTE — NURSING NOTE
"Patient Information     Patient Name MRN Sex     Alexy Carrion [id number] Female 1990      Nursing Note by Mary Kay Sue at 2017  6:00 PM     Author:  Mary Kay Sue Service:  (none) Author Type:  (none)     Filed:  2017  6:07 PM Encounter Date:  2017 Status:  Signed     :  Mary Kay Sue            Patient presents to clinic with c/o \"when I take a deep breath it feels like my throat is burning.\"   In addition, coughing.  Onset 2 days.  Pt is requesting RST this evng.  Mary Kay Sue, BJ ....................  2017   6:01 PM.         "

## 2018-01-04 ENCOUNTER — AMBULATORY - GICH (OUTPATIENT)
Dept: OBGYN | Facility: OTHER | Age: 28
End: 2018-01-04

## 2018-01-04 DIAGNOSIS — K83.1 OBSTRUCTION OF BILE DUCT (H): ICD-10-CM

## 2018-01-04 DIAGNOSIS — O26.613: ICD-10-CM

## 2018-01-04 LAB — Lab: 74 MCMOL/L

## 2018-01-05 ENCOUNTER — PRENATAL OFFICE VISIT - GICH (OUTPATIENT)
Dept: OBGYN | Facility: OTHER | Age: 28
End: 2018-01-05

## 2018-01-05 ENCOUNTER — HISTORY (OUTPATIENT)
Dept: OBGYN | Facility: OTHER | Age: 28
End: 2018-01-05

## 2018-01-05 ENCOUNTER — HOSPITAL ENCOUNTER (OUTPATIENT)
Dept: RADIOLOGY | Facility: OTHER | Age: 28
End: 2018-01-05
Attending: OBSTETRICS & GYNECOLOGY

## 2018-01-05 DIAGNOSIS — O26.613: ICD-10-CM

## 2018-01-05 DIAGNOSIS — K83.1 OBSTRUCTION OF BILE DUCT (H): ICD-10-CM

## 2018-01-09 ENCOUNTER — HISTORY (OUTPATIENT)
Dept: OBGYN | Facility: OTHER | Age: 28
End: 2018-01-09

## 2018-01-10 ENCOUNTER — HISTORY (OUTPATIENT)
Dept: OBGYN | Facility: OTHER | Age: 28
End: 2018-01-10

## 2018-01-26 VITALS
BODY MASS INDEX: 36.5 KG/M2 | WEIGHT: 245 LBS | BODY MASS INDEX: 37.13 KG/M2 | DIASTOLIC BLOOD PRESSURE: 64 MMHG | HEART RATE: 76 BPM | SYSTOLIC BLOOD PRESSURE: 118 MMHG | WEIGHT: 243.6 LBS | HEART RATE: 76 BPM | HEIGHT: 68 IN | DIASTOLIC BLOOD PRESSURE: 66 MMHG | SYSTOLIC BLOOD PRESSURE: 128 MMHG

## 2018-01-26 VITALS
HEART RATE: 90 BPM | HEIGHT: 69 IN | TEMPERATURE: 97.6 F | RESPIRATION RATE: 16 BRPM | SYSTOLIC BLOOD PRESSURE: 98 MMHG | DIASTOLIC BLOOD PRESSURE: 62 MMHG | WEIGHT: 226 LBS | OXYGEN SATURATION: 99 % | BODY MASS INDEX: 33.47 KG/M2

## 2018-01-26 VITALS
HEIGHT: 68 IN | DIASTOLIC BLOOD PRESSURE: 66 MMHG | WEIGHT: 245.2 LBS | SYSTOLIC BLOOD PRESSURE: 118 MMHG | BODY MASS INDEX: 37.16 KG/M2 | HEART RATE: 84 BPM

## 2018-01-26 VITALS
BODY MASS INDEX: 37.31 KG/M2 | HEART RATE: 82 BPM | DIASTOLIC BLOOD PRESSURE: 72 MMHG | SYSTOLIC BLOOD PRESSURE: 114 MMHG | WEIGHT: 245.4 LBS

## 2018-01-26 VITALS — WEIGHT: 244.6 LBS | HEART RATE: 78 BPM | DIASTOLIC BLOOD PRESSURE: 72 MMHG | SYSTOLIC BLOOD PRESSURE: 104 MMHG

## 2018-02-09 VITALS
BODY MASS INDEX: 36.83 KG/M2 | SYSTOLIC BLOOD PRESSURE: 126 MMHG | HEART RATE: 82 BPM | WEIGHT: 242.2 LBS | DIASTOLIC BLOOD PRESSURE: 78 MMHG

## 2018-02-09 VITALS
WEIGHT: 235.6 LBS | DIASTOLIC BLOOD PRESSURE: 78 MMHG | SYSTOLIC BLOOD PRESSURE: 114 MMHG | HEART RATE: 88 BPM | BODY MASS INDEX: 35.82 KG/M2

## 2018-02-09 VITALS
SYSTOLIC BLOOD PRESSURE: 112 MMHG | BODY MASS INDEX: 36.13 KG/M2 | HEART RATE: 82 BPM | DIASTOLIC BLOOD PRESSURE: 78 MMHG | WEIGHT: 237.6 LBS

## 2018-02-09 VITALS
HEART RATE: 84 BPM | SYSTOLIC BLOOD PRESSURE: 120 MMHG | DIASTOLIC BLOOD PRESSURE: 68 MMHG | BODY MASS INDEX: 36.86 KG/M2 | WEIGHT: 242.4 LBS

## 2018-02-12 ENCOUNTER — DOCUMENTATION ONLY (OUTPATIENT)
Dept: FAMILY MEDICINE | Facility: OTHER | Age: 28
End: 2018-02-12

## 2018-02-12 PROBLEM — F98.8 ATTENTION DEFICIT DISORDER: Status: ACTIVE | Noted: 2018-02-12

## 2018-02-12 PROBLEM — B00.1 HERPES LABIALIS: Status: ACTIVE | Noted: 2017-12-07

## 2018-02-12 PROBLEM — O26.643 CHOLESTASIS DURING PREGNANCY IN THIRD TRIMESTER: Status: ACTIVE | Noted: 2018-01-09

## 2018-02-12 RX ORDER — BREAST PUMP
EACH MISCELLANEOUS
COMMUNITY
Start: 2018-01-12 | End: 2021-02-26

## 2018-02-12 RX ORDER — PRENATAL VIT/IRON FUM/FOLIC AC 27MG-0.8MG
1 TABLET ORAL DAILY
COMMUNITY
Start: 2018-01-12 | End: 2021-02-26

## 2018-02-12 NOTE — PROGRESS NOTES
Patient Information     Patient Name MRN Sex     Alexy Carrion [id number] Female 1990      Progress Notes by Bobby Leung MD at 2017  1:30 PM     Author:  Bobby Leung MD Service:  (none) Author Type:  Physician     Filed:  2017  4:48 PM Encounter Date:  2017 Status:  Signed     :  Bobby Leung MD (Physician)            [facility]  Return OB Visit    Problem List:  Estimated Date of Delivery: 18    OB Problem List:   x 3  Psychosocial issues    OB History                    Para  Term     AB  Living     4   3  3       4     SAB   TAB  Ectopic  Multiple   Live Births              1   4                        # Outcome Date  GA  Lbr Jin/2nd  Weight Sex  Delivery Anes PTL Lv   4 Current                3A Term 14  37w1d     M  Vag EPIDURAL  SHRUTI   3B Term 14  37w1d     F  Vag-Breech EPIDURAL  SHRUTI   2 Term 10/2009    01:00  3.289 kg (7 lb 4 oz) M  Vag   SHRUTI   1 Term 2008    03:00  4.082 kg (9 lb) F  Vag   SHRUTI                      Immunization History     Administered  Date(s) Administered     AMB Influenza, IIV3 (Age >=3 years)(Flu Clinic Only) 2013     Hepatitis B, Unspecified 10/03/2001     Influenza, IIV4 10/17/2017     MMR, Unspecified 1991, 10/29/1999     Polio Virus, Unspecified 1989, 1990, 1991, 1995     Tdap 2014     Tdap, Unspecified 1990     Varicella Vaccine 2001       A Rh Positive  Rubella:immune  28 week labs: Passed  Tdap 2017  Flu: done  GBS:2017      S: She denies contractions bleeding or leaking. Baby has been active. She has an itching rash on her extensor lower extremities since having an apparent drug reaction to amoxicillin. She is noting some vaginal discharge recently as well and more pelvic pressure.    O: LMP 2017  See flowsheet    Gen: Well-appearing, NAD    Fundal Height: 35  FHR: 160  Wet prep and GBS collected.    A/P:  Alexy TAVARES  Persons is a 27 y.o.  at 34w6d, here for return OB visit.    (N89.8) Vaginal discharge  (primary encounter diagnosis)  Comment:   Plan: WET PREP GENITAL, metroNIDAZOLE (FLAGYL) 500 mg        tablet            (R21) Rash  Comment:   Plan: triamcinolone (ARISTOCORT; KENALOG) 0.1 % cream            (Z34.83) Normal pregnancy in multigravida in third trimester  Comment:   Plan: GBS          RTC weekly    Bobby Leung MD FACOG  4:46 PM 2017

## 2018-02-12 NOTE — NURSING NOTE
Patient Information     Patient Name MRN Sex     Alexy Carrion [id number] Female 1990      Nursing Note by Priscilla Russo RN at 2017  1:30 PM     Author:  Priscilla Russo RN Service:  (none) Author Type:  NURS- Registered Nurse     Filed:  2017  3:30 PM Encounter Date:  2017 Status:  Signed     :  Priscilla Russo RN (NURS- Registered Nurse)            Flagyl and Triamcinolone called in by this nurse - medications as noted in chart.  Priscilla Russo RN............. 2017 3:30 PM

## 2018-02-12 NOTE — PROGRESS NOTES
Patient Information     Patient Name MRN Sex     Alexy Carrion [id number] Female 1990      Progress Notes by Bobyb Leung MD at 2017  2:45 PM     Author:  Bobby Leung MD Service:  (none) Author Type:  Physician     Filed:  2017  5:58 PM Encounter Date:  2017 Status:  Signed     :  Bobby Leung MD (Physician)            [facility]  Return OB Visit    Problem List:  Estimated Date of Delivery: 18    OB Problem List:   x 3  Psychosocial issues    OB History                    Para  Term     AB  Living     4   3  3       4     SAB   TAB  Ectopic  Multiple   Live Births              1   4                        # Outcome Date  GA  Lbr Jin/2nd  Weight Sex  Delivery Anes PTL Lv   4 Current                3A Term 14  37w1d     M  Vag EPIDURAL  SHRUTI   3B Term 14  37w1d     F  Vag-Breech EPIDURAL  SHRUTI   2 Term 10/2009    01:00  3.289 kg (7 lb 4 oz) M  Vag   SHRUTI   1 Term 2008    03:00  4.082 kg (9 lb) F  Vag   SHRUTI                      Immunization History     Administered  Date(s) Administered     AMB Influenza, IIV3 (Age >=3 years)(Flu Clinic Only) 2013     Hepatitis B, Unspecified 10/03/2001     Influenza, IIV4 10/17/2017     MMR, Unspecified 1991, 10/29/1999     Polio Virus, Unspecified 1989, 1990, 1991, 1995     Tdap 2014     Tdap, Unspecified 1990     Varicella Vaccine 2001       A Rh Positive  Rubella:immune  28 week labs: Passed  Tdap 2017  Flu: done  GBS:*    S: Patient reports recent issues with losing a tooth. She has lost some weight since her last visit. She is currently on Augmentin for possible oral infection. She has developed a rash on both of her lower extremities since starting the antibiotic. She also notices vaginal discharge and a burning sensation at the vulva. She denies contractions bleeding or leaking. Baby has been active.    O: /78  Pulse 82  Wt  109.9 kg (242 lb 3.2 oz)  LMP 2017  BMI 36.83 kg/m2  See flowsheet    Gen: Well-appearing, NAD    Fundal Height:  33  FHR: 150  pelvic exam:  wet prep was collected. Vulva is normal.    A/P:  Alexy Carrion is a 27 y.o.  at 32w1d, here for return OB visit.    RTC two weeks    Bobby Leung MD FACOG  5:57 PM 2017

## 2018-02-12 NOTE — TELEPHONE ENCOUNTER
Patient Information     Patient Name MRN Alexy Doran [id number] Female 1990      Telephone Encounter by Priscilla Russo RN at 2017  4:08 PM     Author:  Priscilla Russo RN Service:  (none) Author Type:  NURS- Registered Nurse     Filed:  2017  4:14 PM Encounter Date:  2017 Status:  Signed     :  Priscilla Russo RN (NURS- Registered Nurse)            Patient states she tried to  her prescription at [facility]. Patient was told that she cannot have triamcinolone because she is pregnant. Patient is wondering what else is safe? Please advise.  Priscilla Russo RN............. 2017 4:13 PM

## 2018-02-12 NOTE — TELEPHONE ENCOUNTER
Patient Information     Patient Name MRN Sex     Alexy Carrion [id number] Female 1990      Telephone Encounter by Bobby Leung MD at 2017  4:37 PM     Author:  Bobby Leung MD Service:  (none) Author Type:  Physician     Filed:  2017  4:39 PM Encounter Date:  2017 Status:  Signed     :  Bobby Leung MD (Physician)            Short term use of topical corticosterioids is safe in pregnancy (i.e. Triamcinolone). She should fill it, or she may use benadryl.

## 2018-02-12 NOTE — PROGRESS NOTES
Patient Information     Patient Name MRN Sex     Alexy Carrion [id number] Female 1990      Progress Notes by Bobby Leung MD at 2017  1:30 PM     Author:  Bobby Leung MD Service:  (none) Author Type:  Physician     Filed:  2017  2:05 PM Encounter Date:  2017 Status:  Signed     :  Bobby Leung MD (Physician)            [facility]  Return OB Visit    Problem List:  Estimated Date of Delivery: 18    OB Problem List:   x 3  Psychosocial issues    OB History                    Para  Term     AB  Living     4   3  3       4     SAB   TAB  Ectopic  Multiple   Live Births              1   4                        # Outcome Date  GA  Lbr Jin/2nd  Weight Sex  Delivery Anes PTL Lv   4 Current                3A Term 14  37w1d     M  Vag EPIDURAL  SHRUTI   3B Term 14  37w1d     F  Vag-Breech EPIDURAL  SHRUTI   2 Term 10/2009    01:00  3.289 kg (7 lb 4 oz) M  Vag   SHRUTI   1 Term 2008    03:00  4.082 kg (9 lb) F  Vag   SHRUTI                      Immunization History     Administered  Date(s) Administered     AMB Influenza, IIV3 (Age >=3 years)(Flu Clinic Only) 2013     Hepatitis B, Unspecified 10/03/2001     Influenza, IIV4 10/17/2017     MMR, Unspecified 1991, 10/29/1999     Polio Virus, Unspecified 1989, 1990, 1991, 1995     Tdap 2014     Tdap, Unspecified 1990     Varicella Vaccine 2001       A Rh Positive  Rubella:immune  28 week labs: Passed  Tdap 2017  Flu: done  GBS:*    S: She denies contractions bleeding or leaking. Baby has been active.    O: /68 (Cuff Site: Right Arm, Position: Sitting, Cuff Size: Adult Regular)  Pulse 84  Wt 110 kg (242 lb 6.4 oz)  LMP 2017  BMI 36.86 kg/m2  See flowsheet    Gen: Well-appearing, NAD    Fundal Height:    FHR:     A/P:  Alexy Carrion is a 27 y.o.  at 33w6d, here for return OB visit.    RTC two weeks    Bobby Leung MD  FACOG  1:57 PM 12/19/2017

## 2018-02-12 NOTE — NURSING NOTE
Patient Information     Patient Name MRN Sex     Alexy Carrion [id number] Female 1990      Nursing Note by Lubna Powers at 2017  1:30 PM     Author:  Lubna Powers Service:  (none) Author Type:  (none)     Filed:  2017  1:59 PM Encounter Date:  2017 Status:  Signed     :  Lubna Powers            Pt presents for prenatal care.  Lubna Powers

## 2018-02-13 NOTE — TELEPHONE ENCOUNTER
"Patient Information     Patient Name MRN Sex     Alexy Carrion [id number] Female 1990      Telephone Encounter by Priscilla Russo RN at 1/3/2018  8:07 AM     Author:  Priscilla Russo RN Service:  (none) Author Type:  NURS- Registered Nurse     Filed:  1/3/2018  8:25 AM Encounter Date:  1/3/2018 Status:  Signed     :  Priscilla Russo RN (NURS- Registered Nurse)            Patient is calling for two things:    -Note to take patient off of work, works 8 hour on feet which is causing her issues in pregnancy. Patient works two jobs, currently 60+ hours per week per patient. Letter printed and available for provider signature in office    -Please review hepatic labs - patient would like to know if these results are \"normal\". Please review and advise.    Patient is aware that provider is in surgery today and may not return call until late today or early tomorrow.    Priscilla Russo RN............. 1/3/2018 8:18 AM         "

## 2018-02-13 NOTE — PROGRESS NOTES
Patient Information     Patient Name MRN Sex     Alexy Carrion [id number] Female 1990      Progress Notes by Bobby Leung MD at 2018 12:45 PM     Author:  Bobby Leung MD Service:  (none) Author Type:  Physician     Filed:  2018 12:34 PM Encounter Date:  2018 Status:  Signed     :  Bobby Leung MD (Physician)            [facility]  Return OB Visit    Problem List:  Estimated Date of Delivery: 18    OB Problem List:   x 3  Psychosocial issues    OB History                    Para  Term     AB  Living     4   3  3       4     SAB   TAB  Ectopic  Multiple   Live Births              1   4                        # Outcome Date  GA  Lbr Jin/2nd  Weight Sex  Delivery Anes PTL Lv   4 Current                3A Term 14  37w1d     M  Vag EPIDURAL  SHRUTI   3B Term 14  37w1d     F  Vag-Breech EPIDURAL  SHRUTI   2 Term 10/2009    01:00  3.289 kg (7 lb 4 oz) M  Vag   SHRUTI   1 Term 2008    03:00  4.082 kg (9 lb) F  Vag   SHRUTI                      Immunization History     Administered  Date(s) Administered     AMB Influenza, IIV3 (Age >=3 years)(Flu Clinic Only) 2013     Hepatitis B, Unspecified 10/03/2001     Influenza, IIV4 10/17/2017     MMR, Unspecified 1991, 10/29/1999     Polio Virus, Unspecified 1989, 1990, 1991, 1995     Tdap 2014     Tdap, Unspecified 1990     Varicella Vaccine 2001       A Rh Positive  Rubella:immune  28 week labs: Passed  Tdap 2017  Flu: done  GBS:2017      S: She basil back pain, continues to struggle with itching skin, showering several times daily to get relief from the itching. She tried triamcinolone cream once and stopped it as she said it made her skin more dry. She is not using cream. Now with itching between fingers or toes. She has nausea and vomiting. Active baby, some pelvic pressure.    O: /78 (Cuff Site: Right Arm, Position: Sitting, Cuff Size:  Adult Large)  Pulse 82  Wt 107.8 kg (237 lb 9.6 oz)  LMP 2017  BMI 36.13 kg/m2  See flowsheet    Gen: Well-appearing, NAD    Fundal Height: 36  FHR: NST cat 1    Cx 3 baby ballots.  Skin has dry flaking appearance with excoriations on her arms.    A/P:  Alexy Carrion is a 27 y.o.  at 36w2d, here for return OB visit.    (O26.613,  K83.1) Cholestasis during pregnancy in third trimester  (primary encounter diagnosis)  Comment:   Plan: CO FETAL NON-STRESS TEST          Plan for induction next week Tuesday due to risk of fetal loss with Cholestasis of pregnancy.    Bobby Leung MD FACOG  12:31 PM 2018

## 2018-02-13 NOTE — NURSING NOTE
Patient Information     Patient Name MRN Sex     Alexy Carrion [id number] Female 1990      Nursing Note by Priscilla Russo RN at 2018  1:02 PM     Author:  Priscilla Russo RN Service:  (none) Author Type:  NURS- Registered Nurse     Filed:  2018  1:05 PM Encounter Date:  2018 Status:  Signed     :  Priscilla Russo RN (NURS- Registered Nurse)            Patient needs biophysical profile completed due to new diagnosis of cholestasis in pregnancy. Order placed and patient needs to be seen after.  Priscilla Russo RN............. 2018 1:05 PM

## 2018-02-13 NOTE — ADDENDUM NOTE
Patient Information     Patient Name MRN Sex     Alexy Carrion [id number] Female 1990      Addendum Note by Bryn Leung MD at 2018  5:19 PM     Author:  Bryn Leung MD Service:  (none) Author Type:  Physician     Filed:  2018  5:19 PM Encounter Date:  2018 Status:  Signed     :  Bryn Leung MD (Physician)       Addended by: BRYN LEUNG on: 2018 05:19 PM        Modules accepted: Orders

## 2018-02-13 NOTE — TELEPHONE ENCOUNTER
Patient Information     Patient Name MRN Sex Alexy Doyle [id number] Female 1990      Telephone Encounter by Priscilla Russo RN at 1/3/2018  2:43 PM     Author:  Priscilla Russo RN Service:  (none) Author Type:  NURS- Registered Nurse     Filed:  1/3/2018  2:45 PM Encounter Date:  1/3/2018 Status:  Signed     :  Priscilla Russo RN (NURS- Registered Nurse)            Left detailed message for patient. Letter is at unit 4 window for . Not all labs are back, patient will be notified once all lab results are back.  Priscilla Russo RN............. 1/3/2018 2:44 PM

## 2018-02-13 NOTE — PROGRESS NOTES
Patient Information     Patient Name MRN Sex     Alexy Carrion [id number] Female 1990      Progress Notes by Bobby Leung MD at 2018  1:30 PM     Author:  Bobby Leung MD Service:  (none) Author Type:  Physician     Filed:  2018  2:33 PM Encounter Date:  2018 Status:  Signed     :  Bobby Leung MD (Physician)            [facility]  Return OB Visit    Problem List:  Estimated Date of Delivery: 18    OB Problem List:   x 3  Psychosocial issues    OB History                    Para  Term     AB  Living     4   3  3       4     SAB   TAB  Ectopic  Multiple   Live Births              1   4                        # Outcome Date  GA  Lbr Jin/2nd  Weight Sex  Delivery Anes PTL Lv   4 Current                3A Term 14  37w1d     M  Vag EPIDURAL  SHRUTI   3B Term 14  37w1d     F  Vag-Breech EPIDURAL  SHRUTI   2 Term 10/2009    01:00  3.289 kg (7 lb 4 oz) M  Vag   SHRUTI   1 Term 2008    03:00  4.082 kg (9 lb) F  Vag   SHRUTI                      Immunization History     Administered  Date(s) Administered     AMB Influenza, IIV3 (Age >=3 years)(Flu Clinic Only) 2013     Hepatitis B, Unspecified 10/03/2001     Influenza, IIV4 10/17/2017     MMR, Unspecified 1991, 10/29/1999     Polio Virus, Unspecified 1989, 1990, 1991, 1995     Tdap 2014     Tdap, Unspecified 1990     Varicella Vaccine 2001       A Rh Positive  Rubella:immune  28 week labs: Passed  Tdap 2017  Flu: done  GBS:2017      S: She is back pain, continues to struggle with itching skin, showering several times daily to get relief from the itching. She tried triamcinolone cream once and stopped it as she said it made her skin more dry. She is not using cream. No itching between fingers or toes. She has leaking of fluid per vagina. She has nausea and vomiting.    O: /78 (Cuff Site: Right Arm, Position: Sitting, Cuff Size: Adult  Large)  Pulse 88  Wt 106.9 kg (235 lb 9.6 oz)  LMP 2017  BMI 35.82 kg/m2  See flowsheet    Gen: Well-appearing, NAD    Fundal Height: 36  FHR: 160  Wet prep, Amnisure collected  Cx 3 baby ballots.  Skin has dry flaking appearance with excoriations on her arms.    A/P:  Alexy Carrion is a 27 y.o.  at 34w6d, here for return OB visit.    (N89.8) Vaginal discharge  (primary encounter diagnosis)  Comment:   Plan: WET PREP GENITAL, Amnisure pending          (R21) Rash  Comment: Recommend [place] baths, baby oil on skin after, use triamcinolone tid.  Plan: triamcinolone (ARISTOCORT; KENALOG) 0.1 % cream        Check LFT's, total bile acids for cholestasis of pregnancy.    Nausea   Reglan 10 mg po qid prn nausea and vomiting.      RTC weekly    Bobby Leung MD FACOG  2:32 PM 2018

## 2018-02-13 NOTE — TELEPHONE ENCOUNTER
Patient Information     Patient Name MRN Sex Alexy Doyle [id number] Female 1990      Telephone Encounter by Tyesha Faulkner RN at 2017  8:21 AM     Author:  Tyesha Faulkner RN Service:  (none) Author Type:  NURS- Registered Nurse     Filed:  2017  8:23 AM Encounter Date:  2017 Status:  Signed     :  Tyesha Faulkner RN (NURS- Registered Nurse)            Patient notified of provider note. She will try to pick it up again today and was instructed to have pharmacy call this nurse if she has any further issues.  Tyesha Faulkner RN ....................  2017   8:23 AM

## 2018-02-15 DIAGNOSIS — T63.441A ALLERGIC REACTION TO BEE STING: ICD-10-CM

## 2018-02-15 DIAGNOSIS — F41.9 ANXIETY: Primary | ICD-10-CM

## 2018-02-19 RX ORDER — EPINEPHRINE 0.3 MG/.3ML
0.3 INJECTION SUBCUTANEOUS PRN
Qty: 0.6 ML | COMMUNITY
Start: 2018-02-19 | End: 2021-02-26

## 2018-02-19 RX ORDER — CITALOPRAM HYDROBROMIDE 10 MG/1
TABLET ORAL
Qty: 30 TABLET | Refills: 0 | Status: SHIPPED | OUTPATIENT
Start: 2018-02-19 | End: 2018-03-23

## 2018-02-19 NOTE — TELEPHONE ENCOUNTER
Refill request transcribed and double checked with [facility] to the best of this nurse's ability.    This is a Refill request from: L[facility]  Name of Medication: Citalopram 10 mg  Quantity requested: #30 R-0  Last fill date: 10/17/2017  Due for refill: Yes  PCP: Israel Ambriz MD   Controlled Substance Agreement:  N/A   Diagnosis r/t this medication request: [facility]    Patient's request for a refill has been approved.  Order entered.     Refill completed per RN Medication Refill Policy.................... Priscilla Russo ....................  2/19/2018   8:39 AM

## 2018-02-21 ENCOUNTER — DOCUMENTATION ONLY (OUTPATIENT)
Dept: FAMILY MEDICINE | Facility: OTHER | Age: 28
End: 2018-02-21

## 2018-02-22 ENCOUNTER — OFFICE VISIT (OUTPATIENT)
Dept: OBGYN | Facility: OTHER | Age: 28
End: 2018-02-22
Attending: FAMILY MEDICINE
Payer: COMMERCIAL

## 2018-02-22 VITALS
BODY MASS INDEX: 35.31 KG/M2 | DIASTOLIC BLOOD PRESSURE: 76 MMHG | SYSTOLIC BLOOD PRESSURE: 126 MMHG | WEIGHT: 232.2 LBS | HEART RATE: 82 BPM

## 2018-02-22 PROBLEM — O26.643 CHOLESTASIS DURING PREGNANCY IN THIRD TRIMESTER: Status: RESOLVED | Noted: 2018-01-09 | Resolved: 2018-02-22

## 2018-02-22 PROBLEM — E66.09 NON MORBID OBESITY DUE TO EXCESS CALORIES: Status: ACTIVE | Noted: 2017-06-20

## 2018-02-22 PROBLEM — Z87.59 HISTORY OF TWIN PREGNANCY IN PRIOR PREGNANCY: Status: ACTIVE | Noted: 2017-06-20

## 2018-02-22 PROBLEM — O09.291: Status: ACTIVE | Noted: 2017-06-20

## 2018-02-22 PROCEDURE — G0463 HOSPITAL OUTPT CLINIC VISIT: HCPCS

## 2018-02-22 PROCEDURE — 99207 ZZC POST-PARTUM 6 WK VISIT - GICH ONLY: CPT | Performed by: OBSTETRICS & GYNECOLOGY

## 2018-02-22 ASSESSMENT — PAIN SCALES - GENERAL: PAINLEVEL: NO PAIN (0)

## 2018-02-22 NOTE — PROGRESS NOTES
CC: postpartum exam at six weeks from delivery    HPI: Alexy Carrion presents for postpartum exam. Baby was born by vaginal delivery. Complications included none  Mood:OK    Breastfeeding: yes,   Incision(s): none  Bleeding: none  Birthcontrol: Mirena in two weeks    Past Medical History:   Diagnosis Date     Personal history of other medical treatment (CODE)     No Comments Provided     Past Surgical History:   Procedure Laterality Date     OTHER SURGICAL HISTORY      [id number],OTHER,age 4     Current Outpatient Prescriptions   Medication     citalopram (CELEXA) 10 MG tablet     EPINEPHrine (EPIPEN/ADRENACLICK/OR ANY BX GENERIC EQUIV) 0.3 MG/0.3ML injection 2-pack     Misc. Devices (BREAST PUMP) MISC     Prenatal Vit-Fe Fumarate-FA (PRENATAL MULTIVITAMIN PLUS IRON) 27-0.8 MG TABS per tablet     No current facility-administered medications for this visit.       Allergies   Allergen Reactions     Doxycycline Anaphylaxis and GI Disturbance     Wasp Venom Protein Anaphylaxis     Aspirin      Other reaction(s): Angioedema  Swelling of throat and nausea     Clindamycin      Other reaction(s): Constipation  Nausea and Vomiting.       REVIEW OF SYSTEMS  General: negative  Skin: negative  : negative    O: /76 (BP Location: Right arm, Patient Position: Sitting)  Pulse 82  Wt 105.3 kg (232 lb 3.2 oz)  LMP  (LMP Unknown)  Breastfeeding? Yes  BMI 35.31 kg/m2  Body mass index is 35.31 kg/(m^2).    Exam:  Constitutional: healthy, alert and no distress  Pelvic exam: normal vagina and vulva, normal cervix without lesions or tenderness, uterus normal size anteverted, adenxa normal in size without tenderness, exam chaperoned by nurse.      PHQ-9 score:    PHQ-9 SCORE 2/22/2018   Total Score 0       I/P:  (Z39.2) Routine postpartum follow-up  (primary encounter diagnosis)  Comment:   Plan: IUD in two weeks    Resumeannual preventive healthcare. Continue PNV's until done with childbearing.      RTC prn    Bobby Leung MD  FACOG  11:45 AM 2/22/2018

## 2018-02-22 NOTE — MR AVS SNAPSHOT
"              After Visit Summary   2018    Alexy Carrion    MRN: [id number]           Patient Information     Date Of Birth          1990        Visit Information        Provider Department      2018 11:15 AM Bobby Leung MD [facility]        Today's Diagnoses     Routine postpartum follow-up    -  1       Follow-ups after your visit        Follow-up notes from your care team     Return if symptoms worsen or fail to improve.      Who to contact     If you have questions or need follow up information about today's clinic visit or your schedule please contact [facility] directly at [phone].  Normal or non-critical lab and imaging results will be communicated to you by Cokonnecthart, letter or phone within 4 business days after the clinic has received the results. If you do not hear from us within 7 days, please contact the clinic through Cokonnecthart or phone. If you have a critical or abnormal lab result, we will notify you by phone as soon as possible.  Submit refill requests through Ready or call your pharmacy and they will forward the refill request to us. Please allow 3 business days for your refill to be completed.          Additional Information About Your Visit        MyChart Information     Ready lets you send messages to your doctor, view your test results, renew your prescriptions, schedule appointments and more. To sign up, go to [email or web address] . Click on \"Log in\" on the left side of the screen, which will take you to the Welcome page. Then click on \"Sign up Now\" on the right side of the page.     You will be asked to enter the access code listed below, as well as some personal information. Please follow the directions to create your username and password.     Your access code is: [email or web address]  Expires: 2018 12:00 PM     Your access code will  in 90 days. If you need help or a new code, please call your [place] clinic or " [phone].        Care EveryWhere ID     This is your Care EveryWhere ID. This could be used by other organizations to access your [place] medical records  [id number]        Your Vitals Were     Pulse Last Period Breastfeeding? BMI (Body Mass Index)          82 (LMP Unknown) Yes 35.31 kg/m2         Blood Pressure from Last 3 Encounters:   02/22/18 126/76   01/05/18 112/78   01/02/18 114/78    Weight from Last 3 Encounters:   02/22/18 105.3 kg (232 lb 3.2 oz)   01/05/18 107.8 kg (237 lb 9.6 oz)   01/02/18 106.9 kg (235 lb 9.6 oz)              Today, you had the following     No orders found for display       Primary Care Provider Office Phone # Fax #    Israel Ambriz [phone] [address] [facility] [address]        Equal Access to Services     [facility] : Hadii aad ku hadasho Sodago, waaxda luqadaha, qaybta kaalmada adecadenyajarad, earnest frederick . So [facility] [phone].    ATENCIÓN: Si habla español, tiene a quezada disposición servicios gratuitos de asistencia lingüística. Selina al [phone].    We comply with applicable federal civil rights laws and [place] laws. We do not discriminate on the basis of race, color, national origin, age, disability, sex, sexual orientation, or gender identity.            Thank you!     Thank you for choosing [facility]  for your care. Our goal is always to provide you with excellent care. Hearing back from our patients is one way we can continue to improve our services. Please take a few minutes to complete the written survey that you may receive in the mail after your visit with us. Thank you!             Your Updated Medication List - Protect others around you: Learn how to safely use, store and throw away your medicines at www.disposemymeds.org.          This list is accurate as of 2/22/18 12:00 PM.  Always use your most recent med list.                   Brand Name Dispense Instructions  for use Diagnosis    breast pump Misc      1 unit. For home use. Gestation age at delivery: 37 weeks. Reason for need: separation from baby. 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

## 2018-02-22 NOTE — LETTER
2/22/2018       RE: Alexy Carrion  [address]  [place] [address]     Dear Colleague,    Thank you for referring your patient, Alexy Carrion, to the [facility] at [facility]. Please see a copy of my visit note below.    CC: postpartum exam at six weeks from delivery    HPI: Alexy Carrion presents for postpartum exam. Baby was born by vaginal delivery. Complications included none  Mood:OK    Breastfeeding: yes,   Incision(s): none  Bleeding: none  Birthcontrol: Mirena in two weeks    Past Medical History:   Diagnosis Date     Personal history of other medical treatment (CODE)     No Comments Provided     Past Surgical History:   Procedure Laterality Date     OTHER SURGICAL HISTORY      [id number],OTHER,age 4     Current Outpatient Prescriptions   Medication     citalopram (CELEXA) 10 MG tablet     EPINEPHrine (EPIPEN/ADRENACLICK/OR ANY BX GENERIC EQUIV) 0.3 MG/0.3ML injection 2-pack     Misc. Devices (BREAST PUMP) MISC     Prenatal Vit-Fe Fumarate-FA (PRENATAL MULTIVITAMIN PLUS IRON) 27-0.8 MG TABS per tablet     No current facility-administered medications for this visit.       Allergies   Allergen Reactions     Doxycycline Anaphylaxis and GI Disturbance     Wasp Venom Protein Anaphylaxis     Aspirin      Other reaction(s): Angioedema  Swelling of throat and nausea     Clindamycin      Other reaction(s): Constipation  Nausea and Vomiting.       REVIEW OF SYSTEMS  General: negative  Skin: negative  : negative    O: /76 (BP Location: Right arm, Patient Position: Sitting)  Pulse 82  Wt 105.3 kg (232 lb 3.2 oz)  LMP  (LMP Unknown)  Breastfeeding? Yes  BMI 35.31 kg/m2  Body mass index is 35.31 kg/(m^2).    Exam:  Constitutional: healthy, alert and no distress  Pelvic exam: normal vagina and vulva, normal cervix without lesions or tenderness, uterus normal size anteverted, adenxa normal in size without tenderness, exam chaperoned by  nurse.      PHQ-9 score:    PHQ-9 SCORE 2/22/2018   Total Score 0       I/P:  (Z39.2) Routine postpartum follow-up  (primary encounter diagnosis)  Comment:   Plan: IUD in two weeks    Resumeannual preventive healthcare. Continue PNV's until done with childbearing.      RTC prn    Again, thank you for allowing me to participate in the care of your patient.      Sincerely,    Bobby Leung MD

## 2018-02-23 ASSESSMENT — PATIENT HEALTH QUESTIONNAIRE - PHQ9: SUM OF ALL RESPONSES TO PHQ QUESTIONS 1-9: 0

## 2018-03-02 ENCOUNTER — OFFICE VISIT (OUTPATIENT)
Dept: OBGYN | Facility: OTHER | Age: 28
End: 2018-03-02
Attending: FAMILY MEDICINE
Payer: COMMERCIAL

## 2018-03-02 PROCEDURE — S9443 LACTATION CLASS: HCPCS | Performed by: REGISTERED NURSE

## 2018-03-02 NOTE — LACTATION NOTE
Tam is a 7 week old infant here with mom Alexy for a weight check.  Tam has been strictly breastfeeding and has had some issues with weight gain.  Tam's birth weight was 6 lb 14.4 oz.  His weight on 2/12/2018 with the public health nurse was 7 lbs.  Tam's weight today is 7 lb 2.7 oz.  Tam has only gained 2.7 oz in 3 weeks showing a well below normal weight gain.  Alexy states she is nursing Tam every 2 to 3 hours during the day and every 3 to 4 hours during the night.  Alexy states she has a scale at home that she is weighing Tam on to see what he gains at the breast.  She states according to her scale this morning, Tam gained 6 oz after 1 feeding session.  I explained to Alexy that this seems to be a very large amount and a probable misread with the scale.  Alexy states she is pumping occasionally throughout the day and is getting about 2 oz at a time.      Observed Tam at the breast today.  He is latching on without difficulty and is using the nipple shield on just the left breast for an inverted nipple.  His suck to swallow ratio is 3:1.  He is moving breast milk at a slow rate.  He gained 1.1 oz post nursing on both sides for a total of 30 minutes.  He does not appear content and continues to show signs of hunger.  Supplemented with 2 oz of Similac Sensitive post today's nursing session.  He aggressively ate the entire 2 oz bottle and retained the same.      Because of Tam's lack of weight gain with just breastfeeding, instructed Alexy to continue putting Tam to the breast and to supplement with formula post each breastfeeding session.  Explained to Alexy that Tam should be eating at least 8-10 times per day.  Tam should return in 1 week for a follow-up weight check.    Scheduled an appointment for a follow-up weight check for Tam with myself on Friday, March 9 at 9:00 and sent Alexy home with formula to begin supplementing after each breastfeeding session.    Jes  Codi RN, IBCLC  Lactation Consultant  [facility]

## 2018-03-02 NOTE — MR AVS SNAPSHOT
After Visit Summary   3/2/2018    Alexy Carrion    MRN: [id number]           Patient Information     Date Of Birth          1990        Visit Information        Provider Department      3/2/2018 1:00 PM  LACTATION NURSE [facility]        Today's Diagnoses     Encounter for lactation counseling    -  1      Care Instructions    See lactation note          Follow-ups after your visit        Follow-up notes from your care team     Return with any questions or concerns.      Your next 10 appointments already scheduled     Mar 30, 2018 10:15 AM CDT   [facility]    [address]  [place] [address]   [phone]           Bring a current list of meds and any records pertaining to this visit. For Physicals, please bring immunization records and any forms needing to be filled out. Please arrive 10 minutes early to complete paperwork.              Who to contact     If you have questions or need follow up information about today's clinic visit or your schedule please contact [facility] directly at [phone].  Normal or non-critical lab and imaging results will be communicated to you by [employer], letter or phone within 4 business days after the clinic has received the results. If you do not hear from us within 7 days, please contact the clinic through [employer] or phone. If you have a critical or abnormal lab result, we will notify you by phone as soon as possible.  Submit refill requests through [employer] or call your pharmacy and they will forward the refill request to us. Please allow 3 business days for your refill to be completed.          Additional Information About Your Visit        [employer] Information     [employer] lets you send messages to your doctor, view your test results, renew your prescriptions, schedule appointments and more. To sign up,  "go to [email or web address] . Click on \"Log in\" on the left side of the screen, which will take you to the Welcome page. Then click on \"Sign up Now\" on the right side of the page.     You will be asked to enter the access code listed below, as well as some personal information. Please follow the directions to create your username and password.     Your access code is: [email or web address]  Expires: 2018 12:00 PM     Your access code will  in 90 days. If you need help or a new code, please call your [facility] clinic or [phone].        Care EveryWhere ID     This is your Care EveryWhere ID. This could be used by other organizations to access your [facility] medical records  [id number]        Your Vitals Were     Last Period                   (LMP Unknown)            Blood Pressure from Last 3 Encounters:   18 126/76   18 112/78   18 114/78    Weight from Last 3 Encounters:   18 105.3 kg (232 lb 3.2 oz)   18 107.8 kg (237 lb 9.6 oz)   18 106.9 kg (235 lb 9.6 oz)              Today, you had the following     No orders found for display       Primary Care Provider Office Phone # Fax #    Israel Ambriz [phone]       [address] [facility] [address]        Equal Access to Services     [facility]: Hadii laura ku hadasho Sodago, waaxda luqadaha, qaybta kaalmada lester, earnest cunningham. So [facility] [phone].    ATENCIÓN: Si habla español, tiene a quezada disposición servicios gratuitos de asistencia lingüística. Llame al [phone].    We comply with applicable federal civil rights laws and [place] laws. We do not discriminate on the basis of race, color, national origin, age, disability, sex, sexual orientation, or gender identity.            Thank you!     Thank you for choosing [facility]  for your care. Our goal is always to provide you with excellent care. Hearing back from our patients is " one way we can continue to improve our services. Please take a few minutes to complete the written survey that you may receive in the mail after your visit with us. Thank you!             Your Updated Medication List - Protect others around you: Learn how to safely use, store and throw away your medicines at www.disposemymeds.org.          This list is accurate as of 3/2/18  2:19 PM.  Always use your most recent med list.                   Brand Name Dispense Instructions for use Diagnosis    breast pump Misc      1 unit. For home use. Gestation age at delivery: 37 weeks. Reason for need: separation from baby. 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

## 2018-03-02 NOTE — PROGRESS NOTES
Tam is a 7 week old infant here with mom Alexy for a weight check.  Tam has been strictly breastfeeding and has had some issues with weight gain.  Tam's birth weight was 6 lb 14.4 oz.  His weight on 2/12/2018 with the public health nurse was 7 lbs.  Tam's weight today is 7 lb 2.7 oz.  Tam has only gained 2.7 oz in 3 weeks showing a well below normal weight gain.  Alexy states she is nursing Tam every 2 to 3 hours during the day and every 3 to 4 hours during the night.  Alexy states she has a scale at home that she is weighing Tam on to see what he gains at the breast.  She states according to her scale this morning, Tam gained 6 oz after 1 feeding session.  I explained to Alexy that this seems to be a very large amount and a probable misread with the scale.  Alexy states she is pumping occasionally throughout the day and is getting about 2 oz at a time.      Observed Tam at the breast today.  He is latching on without difficulty and is using the nipple shield on just the left breast for an inverted nipple.  His suck to swallow ratio is 3:1.  He is moving breast milk at a slow rate.  He gained 1.1 oz post nursing on both sides for a total of 30 minutes.  He does not appear content and continues to show signs of hunger.  Supplemented with 2 oz of Similac Sensitive post today's nursing session.  He aggressively ate the entire 2 oz bottle and retained the same.      Because of Tam's lack of weight gain with just breastfeeding, instructed Alexy to continue putting Tam to the breast and to supplement with formula post each breastfeeding session.  Explained to Alexy that Tam should be eating at least 8-10 times per day.  Tam should return in 1 week for a follow-up weight check.    Scheduled an appointment for a follow-up weight check for Tam with myself on Friday, March 9 at 9:00 and sent Alexy home with formula to begin supplementing after each breastfeeding session.    Jes  Codi RN, IBCLC  Lactation Consultant  [facility]

## 2018-03-19 DIAGNOSIS — F41.9 ANXIETY: ICD-10-CM

## 2018-03-23 RX ORDER — CITALOPRAM HYDROBROMIDE 10 MG/1
TABLET ORAL
Qty: 30 TABLET | Refills: 0 | OUTPATIENT
Start: 2018-03-23

## 2018-03-23 NOTE — TELEPHONE ENCOUNTER
Chart review shows that Dr. Leung will no longer fill rx as requested as noted below:       Disp Refills Start End JOANNA   citalopram (CELEXA) 10 MG tablet 30 tablet 0 2/19/2018  No   Sig: TAKE 1 TABLET BY MOUTH EVERY DAY   Class: E-Prescribe   Notes to Pharmacy: Next refill to come from patient's PCP   Order: [id number]   E-Prescribing Status: Receipt confirmed by pharmacy (2/19/2018  8:58 AM CST)     Refills need to come from patient's PCP. However, patient has not seen her PCP since 3/13/14. Writer is unable to fill rx as requested. PCP however is in the office today. Writer did contact patient. Patient reports she was only on celexa when she was pregnant. Is no longer in need of rx as requested and is no longer taking rx. Patient would like this writer to refuse rx as requested at this time. Writer will do as requested, as well as will remove rx from patient's active med list.    Unable to complete prescription refill per RN Medication Refill Policy. Jason Guerra 3/23/2018 3:56 PM

## 2018-03-30 ENCOUNTER — OFFICE VISIT (OUTPATIENT)
Dept: OBGYN | Facility: OTHER | Age: 28
End: 2018-03-30
Attending: OBSTETRICS & GYNECOLOGY
Payer: COMMERCIAL

## 2018-03-30 VITALS
HEART RATE: 80 BPM | DIASTOLIC BLOOD PRESSURE: 70 MMHG | WEIGHT: 248.8 LBS | SYSTOLIC BLOOD PRESSURE: 122 MMHG | BODY MASS INDEX: 37.83 KG/M2

## 2018-03-30 DIAGNOSIS — Z30.430 ENCOUNTER FOR INSERTION OF INTRAUTERINE CONTRACEPTIVE DEVICE (IUD): Primary | ICD-10-CM

## 2018-03-30 PROCEDURE — 58300 INSERT INTRAUTERINE DEVICE: CPT | Performed by: OBSTETRICS & GYNECOLOGY

## 2018-03-30 PROCEDURE — G0463 HOSPITAL OUTPT CLINIC VISIT: HCPCS

## 2018-03-30 NOTE — MR AVS SNAPSHOT
"              After Visit Summary   3/30/2018    Alexy Carrion    MRN: [id number]           Patient Information     Date Of Birth          1990        Visit Information        Provider Department      3/30/2018 10:15 AM Bobby Leung MD [facility]        Today's Diagnoses     Encounter for insertion of intrauterine contraceptive device (IUD)    -  1       Follow-ups after your visit        Who to contact     If you have questions or need follow up information about today's clinic visit or your schedule please contact [facility] directly at [phone].  Normal or non-critical lab and imaging results will be communicated to you by [employer], letter or phone within 4 business days after the clinic has received the results. If you do not hear from us within 7 days, please contact the clinic through [employer] or phone. If you have a critical or abnormal lab result, we will notify you by phone as soon as possible.  Submit refill requests through [employer] or call your pharmacy and they will forward the refill request to us. Please allow 3 business days for your refill to be completed.          Additional Information About Your Visit        MyChart Information     [employer] lets you send messages to your doctor, view your test results, renew your prescriptions, schedule appointments and more. To sign up, go to [email or web address] . Click on \"Log in\" on the left side of the screen, which will take you to the Welcome page. Then click on \"Sign up Now\" on the right side of the page.     You will be asked to enter the access code listed below, as well as some personal information. Please follow the directions to create your username and password.     Your access code is: [email or web address]  Expires: 2018  1:00 PM     Your access code will  in 90 days. If you need help or a new code, please call your [facility] clinic or [phone].        Care EveryWhere ID     This is " your Care EveryWhere ID. This could be used by other organizations to access your [facility] medical records  [id number]        Your Vitals Were     Pulse Breastfeeding? BMI (Body Mass Index)             80 Yes 37.83 kg/m2          Blood Pressure from Last 3 Encounters:   03/30/18 122/70   02/22/18 126/76   01/05/18 112/78    Weight from Last 3 Encounters:   03/30/18 112.9 kg (248 lb 12.8 oz)   02/22/18 105.3 kg (232 lb 3.2 oz)   01/05/18 107.8 kg (237 lb 9.6 oz)              We Performed the Following     Insertion of an IUD     Mirena        Primary Care Provider Office Phone # Fax #    Israel Ambriz [phone] [address] [facility] [address]        Equal Access to Services     GELA HEATH : Pilo Angelo, waaxjarad patterson, akilaybtiffanie kaalraven batista, earnest frederick . So [facility] [phone].    ATENCIÓN: Si habla español, tiene a quezada disposición servicios gratuitos de asistencia lingüística. Selina al [phone].    We comply with applicable federal civil rights laws and [place] laws. We do not discriminate on the basis of race, color, national origin, age, disability, sex, sexual orientation, or gender identity.            Thank you!     Thank you for choosing [facility]  for your care. Our goal is always to provide you with excellent care. Hearing back from our patients is one way we can continue to improve our services. Please take a few minutes to complete the written survey that you may receive in the mail after your visit with us. Thank you!             Your Updated Medication List - Protect others around you: Learn how to safely use, store and throw away your medicines at www.disposemymeds.org.          This list is accurate as of 3/30/18 10:48 AM.  Always use your most recent med list.                   Brand Name Dispense Instructions for use Diagnosis    breast pump Misc      1 unit. For home use.  Gestation age at delivery: 37 weeks. Reason for need: separation from baby. 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

## 2018-03-30 NOTE — NURSING NOTE
Pt presents to clinic today for IUD placement.  Lubna Powers  Prior to the start of the procedure and with procedural staff participation, I verbally confirmed the patient s identity using two indicators, relevant allergies, that the procedure was appropriate and matched the consent or emergent situation, and that the correct equipment/implants were available. Immediately prior to starting the procedure I conducted the Time Out with the procedural staff and re-confirmed the patient s name, procedure, and site/side. (The Joint Commission universal protocol was followed.)  Yes    Sedation (Moderate or Deep): None

## 2018-03-30 NOTE — LETTER
3/30/2018       RE: Alexy Carrion  [address]  [place] [address]     Dear Colleague,    Thank you for referring your patient, Alexy Carrion, to the [facility] at [facility]. Please see a copy of my visit note below.    CC: Mirena IUD    HPI: Alexy Carrion presents for IUD insertion. She has not had sex since her vaginal delivery in January. No menses yet, she is breast feeding. She has had Mirena before without issues.    Past Medical History:   Diagnosis Date     Personal history of other medical treatment (CODE)     No Comments Provided     Past Surgical History:   Procedure Laterality Date     OTHER SURGICAL HISTORY      [id number],OTHER,age 4       Current Outpatient Prescriptions   Medication     EPINEPHrine (EPIPEN/ADRENACLICK/OR ANY BX GENERIC EQUIV) 0.3 MG/0.3ML injection 2-pack     Misc. Devices (BREAST PUMP) MISC     Prenatal Vit-Fe Fumarate-FA (PRENATAL MULTIVITAMIN PLUS IRON) 27-0.8 MG TABS per tablet     No current facility-administered medications for this visit.        REVIEW OF SYSTEMS  General: negative  GI: negative  : negative  HEM: no history of DVT    Exam:  Gen: NAD  IUD placement:    After consent was obtained and timeout performed, the patient was positioned in dorsal lithotomy. Bimanual exam confirms an anteverted uterus. A speculum was inserted. The cervix was prepped with betadine. The cervix was grasped on the anterior lip and the uterus sounded to 9 cm. The Mirena IUD was inserted to 9 cm without difficulty and easily deployed. The strings were trimmed to 2 cm. The cervix was released fromthe tenaculum. The cervix was made hemostatic with silver nitrate. The patient tolerated the procedure. No complications.      Results for orders placed or performed during the hospital encounter of 01/09/18   Anti Treponema   Result Value Ref Range    Treponema Pallidum - Historical Negative Negative   Hepatic panel   Result Value  Ref Range    Bilirubin Indirect 0.4 0.2 - 0.8 mg/dL    AST (SGOT) - Historical 36 13 - 39 IU/L    Alkaline Phosphatase 184 (H) 34 - 104 IU/L    Albumin 3.1 (L) 3.5 - 5.7 g/dL    ALT (SGPT) - Historical 42 7 - 52 IU/L    Globulin - Historical 2.7 2.0 - 3.7 g/dL    A/G Ratio - Historical 1.1 1.0 - 2.0    Bilirubin Direct 0.08 0.03 - 0.18 mg/dL    Bilirubin Total 0.5 0.3 - 1.0 mg/dL    Protein Total 5.8 (L) 6.4 - 8.9 g/dL   Hepatitis B surface antigen   Result Value Ref Range    HbSAg Category - Historical Nonreactive Nonreactive   Anti HIV 1/2 - Historical Result   Result Value Ref Range    HIV-1/HIV-2 Antibody Category - Historical Non-Reactive Non-Reactive    Narrative    HIV-1 p24 and HIV-1/HIV-2 Ab not detected   CBC with platelets differential   Result Value Ref Range    MPV 11.6 (H) 6.5 - 11.0 fL    RDW 13.4 11.5 - 15.5 %    Red Blood Count - Historical 3.60 (L) 4.00 - 5.20 mil/cu mm    Absolute Monocytes - Historical 0.6 <0.9 thou/cu mm    Immature Granulocytes (Metas,Myelos,Pros) - Historical 0.7 %    MCV 86 80 - 100 fL    % Eosinophils 1.0 <8.0 %    Absolute Neutrophils - Historical 8.0 (H) 1.7 - 7.0 thou/cu mm    % Neutrophils 79.3 (H) 42.0 - 72.0 %    MCHC 33.7 32.0 - 36.0 g/dL    Hemoglobin 10.4 (L) 12.0 - 16.0 g/dL    % Monocytes 5.9 <12.0 %    BASO 0.2 <3.0 %    White Blood Count - Historical 10.1 4.5 - 11.0 thou/cu mm    Absolute Basophils - Historical 0.0 <0.3 thou/cu mm    Absolute Eosinophils - Historical 0.1 <0.5 thou/cu mm    Absolute Lymphocytes - Historical 1.3 0.9 - 2.9 thou/cu mm    Absolute Immature Granulocytes (Metas,Myelos,Pros) - Historical 0.1 <=0.3 thou/cu mm    Platelet Count 172 140 - 440 thou/cu mm    Hematocrit 30.9 (L) 33.0 - 51.0 %    % Lymphocytes 12.9 (L) 20.0 - 44.0 %    MCH 28.9 26.0 - 34.0 pg   Hemoglobin   Result Value Ref Range    MCV 86 80 - 100 fL    Hemoglobin 10.4 (L) 12.0 - 16.0 g/dL   ABO/Rh type and screen   Result Value Ref Range    Antibody Screen - Historical  Negative Negative    ABORH - Historical A Rh Positive     Specimen Expiration Date/Time - Historical 01/12/18 08:15      I/P:  (Z30.430) Encounter for insertion of intrauterine contraceptive device (IUD)  (primary encounter diagnosis)  Comment:   Plan: Insertion of an IUD, Mirena           She was instructed to return in one month for a string check unless she can feel them herself.Removal or replacement is recommended in 5 years from 3/30/2018. She is to call with signs of heavy bleeding, foul discharge, pain, or expulsion of the IUD, or inability to feel her strin    Bobby Leung MD FACOG  10:45 AM 3/30/2018

## 2018-03-30 NOTE — PROGRESS NOTES
CC: Mirena IUD    HPI: Alexy Carrion presents for IUD insertion. She has not had sex since her vaginal delivery in January. No menses yet, she is breast feeding. She has had Mirena before without issues.    Past Medical History:   Diagnosis Date     Personal history of other medical treatment (CODE)     No Comments Provided     Past Surgical History:   Procedure Laterality Date     OTHER SURGICAL HISTORY      [id number],OTHER,age 4       Current Outpatient Prescriptions   Medication     EPINEPHrine (EPIPEN/ADRENACLICK/OR ANY BX GENERIC EQUIV) 0.3 MG/0.3ML injection 2-pack     Misc. Devices (BREAST PUMP) MISC     Prenatal Vit-Fe Fumarate-FA (PRENATAL MULTIVITAMIN PLUS IRON) 27-0.8 MG TABS per tablet     No current facility-administered medications for this visit.        REVIEW OF SYSTEMS  General: negative  GI: negative  : negative  HEM: no history of DVT    Exam:  Gen: NAD  IUD placement:    After consent was obtained and timeout performed, the patient was positioned in dorsal lithotomy. Bimanual exam confirms an anteverted uterus. A speculum was inserted. The cervix was prepped with betadine. The cervix was grasped on the anterior lip and the uterus sounded to 9 cm. The Mirena IUD was inserted to 9 cm without difficulty and easily deployed. The strings were trimmed to 2 cm. The cervix was released fromthe tenaculum. The cervix was made hemostatic with silver nitrate. The patient tolerated the procedure. No complications.            Results for orders placed or performed during the hospital encounter of 01/09/18   Anti Treponema   Result Value Ref Range    Treponema Pallidum - Historical Negative Negative   Hepatic panel   Result Value Ref Range    Bilirubin Indirect 0.4 0.2 - 0.8 mg/dL    AST (SGOT) - Historical 36 13 - 39 IU/L    Alkaline Phosphatase 184 (H) 34 - 104 IU/L    Albumin 3.1 (L) 3.5 - 5.7 g/dL    ALT (SGPT) - Historical 42 7 - 52 IU/L    Globulin - Historical 2.7 2.0 - 3.7 g/dL    A/G Ratio -  Historical 1.1 1.0 - 2.0    Bilirubin Direct 0.08 0.03 - 0.18 mg/dL    Bilirubin Total 0.5 0.3 - 1.0 mg/dL    Protein Total 5.8 (L) 6.4 - 8.9 g/dL   Hepatitis B surface antigen   Result Value Ref Range    HbSAg Category - Historical Nonreactive Nonreactive   Anti HIV 1/2 - Historical Result   Result Value Ref Range    HIV-1/HIV-2 Antibody Category - Historical Non-Reactive Non-Reactive    Narrative    HIV-1 p24 and HIV-1/HIV-2 Ab not detected   CBC with platelets differential   Result Value Ref Range    MPV 11.6 (H) 6.5 - 11.0 fL    RDW 13.4 11.5 - 15.5 %    Red Blood Count - Historical 3.60 (L) 4.00 - 5.20 mil/cu mm    Absolute Monocytes - Historical 0.6 <0.9 thou/cu mm    Immature Granulocytes (Metas,Myelos,Pros) - Historical 0.7 %    MCV 86 80 - 100 fL    % Eosinophils 1.0 <8.0 %    Absolute Neutrophils - Historical 8.0 (H) 1.7 - 7.0 thou/cu mm    % Neutrophils 79.3 (H) 42.0 - 72.0 %    MCHC 33.7 32.0 - 36.0 g/dL    Hemoglobin 10.4 (L) 12.0 - 16.0 g/dL    % Monocytes 5.9 <12.0 %    BASO 0.2 <3.0 %    White Blood Count - Historical 10.1 4.5 - 11.0 thou/cu mm    Absolute Basophils - Historical 0.0 <0.3 thou/cu mm    Absolute Eosinophils - Historical 0.1 <0.5 thou/cu mm    Absolute Lymphocytes - Historical 1.3 0.9 - 2.9 thou/cu mm    Absolute Immature Granulocytes (Metas,Myelos,Pros) - Historical 0.1 <=0.3 thou/cu mm    Platelet Count 172 140 - 440 thou/cu mm    Hematocrit 30.9 (L) 33.0 - 51.0 %    % Lymphocytes 12.9 (L) 20.0 - 44.0 %    MCH 28.9 26.0 - 34.0 pg   Hemoglobin   Result Value Ref Range    MCV 86 80 - 100 fL    Hemoglobin 10.4 (L) 12.0 - 16.0 g/dL   ABO/Rh type and screen   Result Value Ref Range    Antibody Screen - Historical Negative Negative    ABORH - Historical A Rh Positive     Specimen Expiration Date/Time - Historical 01/12/18 08:15        I/P:  (Z30.430) Encounter for insertion of intrauterine contraceptive device (IUD)  (primary encounter diagnosis)  Comment:   Plan: Insertion of an IUD,  Mirena           She was instructed to return in one month for a string check unless she can feel them herself.Removal or replacement is recommended in 5 years from 3/30/2018. She is to call with signs of heavy bleeding, foul discharge, pain, or expulsion of the IUD, or inability to feel her strin      Bobby Leung MD FACOG  10:45 AM 3/30/2018

## 2018-07-24 NOTE — PROGRESS NOTES
Patient Information     Patient Name  Alexy Carrion MRN  [id number] Sex  Female   1990      Letter by Bobby Leung MD at      Author:  Bobby Leung MD Service:  (none) Author Type:  (none)    Filed:   Encounter Date:  1/3/2018 Status:  (Other)           Alexy Carrion  [address] [facility] [address]          January 3, 2018      INABILITY TO RETURN TO WORK      Alexy Carrion has been under my care from 2017 and is NOT able to return to work until cleared by a provider. Will be off of work UP TO 12 weeks following delivery of infant, expected delivery date of 2018.       Remarks: Pregnancy complications, inability to work on feet at this time.    Sincerely,          Dr Bobby Leung MD, FACOG

## 2018-07-24 NOTE — PROGRESS NOTES
Patient Information     Patient Name  PersonsAlexy MRN  [id number] Sex  Female   1990      Letter by Bobby Leung MD at      Author:  Bobby Leung MD Service:  (none) Author Type:  (none)    Filed:   Encounter Date:  2017 Status:  (Other)           Alexy TAVARES Persons  [address] [address]          2017    Dear MsClaudia Persons:    The result from the Pap test(s) you had done at your recent clinic visit came back as normal.     We recommend that you have an adult physical exam each year. Depending on your Pap test history, you may or may not need a Pap test at these visits.  You and your health care provider will decide what is right for you.    If you have any further questions or concerns, please call [phone]. You may also contact us by using medical messaging if you have MyChart.    Thank you for choosing [facility] to participate in your healthcare needs.    Sincerely,    Dr Bobby Leung MD, FACOG    Zo Mederos RN        The [facility] is a statewide comprehensive breast and cervical cancer screening program that provides free screening and follow-up services (including colposcopy) to uninsured and underinsured women. For more information call toll free [phone] ([phone])

## 2020-12-20 ENCOUNTER — HEALTH MAINTENANCE LETTER (OUTPATIENT)
Age: 30
End: 2020-12-20

## 2021-02-26 ENCOUNTER — OFFICE VISIT (OUTPATIENT)
Dept: FAMILY MEDICINE | Facility: OTHER | Age: 31
End: 2021-02-26
Attending: FAMILY MEDICINE
Payer: COMMERCIAL

## 2021-02-26 VITALS
TEMPERATURE: 99 F | RESPIRATION RATE: 20 BRPM | BODY MASS INDEX: 36.43 KG/M2 | HEIGHT: 69 IN | SYSTOLIC BLOOD PRESSURE: 132 MMHG | DIASTOLIC BLOOD PRESSURE: 80 MMHG | OXYGEN SATURATION: 97 % | WEIGHT: 246 LBS | HEART RATE: 84 BPM

## 2021-02-26 DIAGNOSIS — T63.441A ALLERGIC REACTION TO BEE STING: ICD-10-CM

## 2021-02-26 DIAGNOSIS — Z11.3 SCREEN FOR STD (SEXUALLY TRANSMITTED DISEASE): Primary | ICD-10-CM

## 2021-02-26 LAB
C TRACH DNA SPEC QL NAA+PROBE: NOT DETECTED
N GONORRHOEA DNA SPEC QL NAA+PROBE: NOT DETECTED
SPECIMEN SOURCE: ABNORMAL
SPECIMEN SOURCE: NORMAL
WET PREP SPEC: ABNORMAL

## 2021-02-26 PROCEDURE — 86803 HEPATITIS C AB TEST: CPT | Mod: ZL | Performed by: FAMILY MEDICINE

## 2021-02-26 PROCEDURE — 86780 TREPONEMA PALLIDUM: CPT | Mod: ZL | Performed by: FAMILY MEDICINE

## 2021-02-26 PROCEDURE — 99213 OFFICE O/P EST LOW 20 MIN: CPT | Performed by: FAMILY MEDICINE

## 2021-02-26 PROCEDURE — 36415 COLL VENOUS BLD VENIPUNCTURE: CPT | Mod: ZL | Performed by: FAMILY MEDICINE

## 2021-02-26 PROCEDURE — 87491 CHLMYD TRACH DNA AMP PROBE: CPT | Mod: ZL | Performed by: FAMILY MEDICINE

## 2021-02-26 PROCEDURE — G0463 HOSPITAL OUTPT CLINIC VISIT: HCPCS

## 2021-02-26 PROCEDURE — 87389 HIV-1 AG W/HIV-1&-2 AB AG IA: CPT | Mod: ZL | Performed by: FAMILY MEDICINE

## 2021-02-26 PROCEDURE — 87210 SMEAR WET MOUNT SALINE/INK: CPT | Mod: ZL | Performed by: FAMILY MEDICINE

## 2021-02-26 PROCEDURE — 86706 HEP B SURFACE ANTIBODY: CPT | Mod: ZL | Performed by: FAMILY MEDICINE

## 2021-02-26 PROCEDURE — 87591 N.GONORRHOEAE DNA AMP PROB: CPT | Mod: ZL | Performed by: FAMILY MEDICINE

## 2021-02-26 RX ORDER — EPINEPHRINE 0.3 MG/.3ML
0.3 INJECTION SUBCUTANEOUS PRN
Qty: 0.6 ML | Refills: 11 | Status: SHIPPED | OUTPATIENT
Start: 2021-02-26 | End: 2021-09-11

## 2021-02-26 ASSESSMENT — ENCOUNTER SYMPTOMS
FEVER: 0
NERVOUS/ANXIOUS: 0
CHILLS: 0
DYSURIA: 0
COUGH: 0

## 2021-02-26 ASSESSMENT — MIFFLIN-ST. JEOR: SCORE: 1895.23

## 2021-02-26 ASSESSMENT — PAIN SCALES - GENERAL: PAINLEVEL: NO PAIN (0)

## 2021-02-26 NOTE — NURSING NOTE
Patient presents to clinic requesting STD testing after partner tested positive with chlamydia.  She states this partner was not her .  Medication Reconciliation: complete    Zo Lindsey LPN

## 2021-02-26 NOTE — PROGRESS NOTES
SUBJECTIVE:   Nursing Notes:   Zo Lindsey LPN  2/26/2021  8:26 AM  Sign at exiting of workspace  Patient presents to clinic requesting STD testing after partner tested positive with chlamydia.  She states this partner was not her .  Medication Reconciliation: complete    Zo Lindsey LPN        Alexy Carrion is a 30 year old female who presents to clinic today for sexually transmitted infection screening.  She has been exposed to a partner with chlamydia 2 weeks ago.  She has not had any discharge, vaginal pain/irritation, urethral irritation or any other symptoms.  She has had chlamydia in the past.    She needs a refill of the epipen she uses in the event of bee stings.    HPI    I personally reviewed medications/allergies/history listed below:    Patient Active Problem List    Diagnosis Date Noted     Attention deficit disorder 02/12/2018     Priority: Medium     Herpes labialis 12/07/2017     Priority: Medium     Non morbid obesity due to excess calories 06/20/2017     Priority: Medium     History of twin pregnancy in prior pregnancy 06/20/2017     Priority: Medium     History of postpartum hemorrhage, currently pregnant, first trimester 06/20/2017     Priority: Medium     Bee sting allergy 08/11/2015     Priority: Medium     Trauma 05/22/2014     Priority: Medium     Gall stones 03/13/2014     Priority: Medium     Overview:   Incidental on OB ultrasound       Myofascial muscle pain 09/28/2013     Priority: Medium     Spasm of paraspinal muscle 09/28/2013     Priority: Medium     Trigger point of thoracic region 09/28/2013     Priority: Medium     Bipolar I disorder (H) 04/07/2011     Priority: Medium     Past Medical History:   Diagnosis Date     Personal history of other medical treatment (CODE)     No Comments Provided      Past Surgical History:   Procedure Laterality Date     OTHER SURGICAL HISTORY      888057,OTHER,age 4     History reviewed. No pertinent family history.  Social  "History     Tobacco Use     Smoking status: Never Smoker     Smokeless tobacco: Never Used   Substance Use Topics     Alcohol use: No     Alcohol/week: 0.0 standard drinks     Comment: Alcoholic Drinks/day: rarely     Social History     Social History Narrative    No smoking.  One daughter and one son;  New significant other Smith    Preload  8/26/2013     Current Outpatient Medications   Medication Sig Dispense Refill     EPINEPHrine (ANY BX GENERIC EQUIV) 0.3 MG/0.3ML injection 2-pack Inject 0.3 mLs (0.3 mg) into the muscle as needed for anaphylaxis 0.6 mL 11     Allergies   Allergen Reactions     Aspirin Anaphylaxis and Hives     Other reaction(s): Angioedema  Swelling of throat and nausea  Other reaction(s): Wheezing     Doxycycline Anaphylaxis and GI Disturbance     Wasp Venom Protein Anaphylaxis     Clindamycin      Other reaction(s): Constipation  Nausea and Vomiting.       Review of Systems   Constitutional: Negative for chills and fever.   Respiratory: Negative for cough.    Cardiovascular: Negative for peripheral edema.   Genitourinary: Negative for dysuria, vaginal discharge and vaginal pain.   Psychiatric/Behavioral: Negative for mood changes. The patient is not nervous/anxious.         OBJECTIVE:     /80 (BP Location: Right arm, Patient Position: Sitting, Cuff Size: Adult Large)   Pulse 84   Temp 99  F (37.2  C) (Tympanic)   Resp 20   Ht 1.753 m (5' 9\")   Wt 111.6 kg (246 lb)   LMP  (LMP Unknown)   SpO2 97%   Breastfeeding No   BMI 36.33 kg/m    Body mass index is 36.33 kg/m .  Physical Exam  Constitutional:       Appearance: Normal appearance.   Cardiovascular:      Rate and Rhythm: Normal rate and regular rhythm.      Heart sounds: No murmur.   Pulmonary:      Effort: Pulmonary effort is normal.      Breath sounds: Normal breath sounds. No wheezing or rales.   Genitourinary:     Comments: Pelvic Exam:  Vulva: No external lesions, normal hair distribution, no adenopathy  Vagina: " Moist, pink, no abnormal discharge, well rugated, no lesions  Cervix: wet prep and Gonorrhea/Chlamydia obtained, parous, smooth, pink, no visible lesions    Neurological:      Mental Status: She is alert.   Psychiatric:         Mood and Affect: Mood normal.         Behavior: Behavior normal.           PHQ-9 SCORE 10/24/2016 2/22/2018   PHQ-9 Total Score 0 0       PHQ-2 Score:     PHQ-2 ( 1999 [employer]) 2/26/2021 2/22/2018   Q1: Little interest or pleasure in doing things 0 0   Q2: Feeling down, depressed or hopeless 0 0   PHQ-2 Score 0 0         I personally reviewed results withpatient as listed below:   Diagnostic Test Results:  none     ASSESSMENT/PLAN:       ICD-10-CM    1. Screen for STD (sexually transmitted disease)  Z11.3 GC/Chlamydia by PCR     HIV Antigen Antibody Combo     Treponema Ab w Reflex to RPR and Titer     Hepatitis C Screen Reflex to HCV RNA Quant and Genotype     Hepatitis B Surface Antibody     Wet Prep, Genital   2. Allergic reaction to bee sting  T63.441A EPINEPHrine (ANY BX GENERIC EQUIV) 0.3 MG/0.3ML injection 2-pack       1.  Testing sent as above.  Will treat as needed.  2.  Epinephrine pen refilled.    Radha Loza MD  [facility]    Portions of this dictation were created using the Dragon Nuance voice recognition system. Proofreading was completed but there may be errors in text.

## 2021-02-27 LAB
HBV SURFACE AB SERPL IA-ACNC: 14.95 M[IU]/ML
HCV AB SERPL QL IA: NONREACTIVE
HIV 1+2 AB+HIV1 P24 AG SERPL QL IA: NONREACTIVE
T PALLIDUM AB SER QL: NONREACTIVE

## 2021-03-01 DIAGNOSIS — B96.89 BV (BACTERIAL VAGINOSIS): ICD-10-CM

## 2021-03-01 DIAGNOSIS — N76.0 BV (BACTERIAL VAGINOSIS): ICD-10-CM

## 2021-03-01 DIAGNOSIS — Z20.2 EXPOSURE TO CHLAMYDIA: Primary | ICD-10-CM

## 2021-03-01 RX ORDER — METRONIDAZOLE 500 MG/1
500 TABLET ORAL 2 TIMES DAILY
Qty: 14 TABLET | Refills: 0 | Status: SHIPPED | OUTPATIENT
Start: 2021-03-01 | End: 2021-03-08

## 2021-03-01 RX ORDER — AZITHROMYCIN 500 MG/1
1000 TABLET, FILM COATED ORAL ONCE
Qty: 2 TABLET | Refills: 0 | Status: SHIPPED | OUTPATIENT
Start: 2021-03-01 | End: 2021-03-01

## 2021-03-12 ENCOUNTER — OFFICE VISIT (OUTPATIENT)
Dept: FAMILY MEDICINE | Facility: OTHER | Age: 31
End: 2021-03-12
Attending: PHYSICIAN ASSISTANT
Payer: COMMERCIAL

## 2021-03-12 VITALS
HEART RATE: 78 BPM | WEIGHT: 242.6 LBS | TEMPERATURE: 98.4 F | DIASTOLIC BLOOD PRESSURE: 80 MMHG | SYSTOLIC BLOOD PRESSURE: 122 MMHG | BODY MASS INDEX: 35.83 KG/M2 | RESPIRATION RATE: 20 BRPM

## 2021-03-12 DIAGNOSIS — Z01.419 PAP TEST, AS PART OF ROUTINE GYNECOLOGICAL EXAMINATION: Primary | ICD-10-CM

## 2021-03-12 DIAGNOSIS — Z30.432 ENCOUNTER FOR IUD REMOVAL: ICD-10-CM

## 2021-03-12 PROCEDURE — G0463 HOSPITAL OUTPT CLINIC VISIT: HCPCS

## 2021-03-12 PROCEDURE — G0463 HOSPITAL OUTPT CLINIC VISIT: HCPCS | Mod: 25

## 2021-03-12 PROCEDURE — 99213 OFFICE O/P EST LOW 20 MIN: CPT | Mod: 25 | Performed by: PHYSICIAN ASSISTANT

## 2021-03-12 PROCEDURE — G0123 SCREEN CERV/VAG THIN LAYER: HCPCS | Performed by: PHYSICIAN ASSISTANT

## 2021-03-12 PROCEDURE — 58301 REMOVE INTRAUTERINE DEVICE: CPT | Performed by: PHYSICIAN ASSISTANT

## 2021-03-12 PROCEDURE — 87624 HPV HI-RISK TYP POOLED RSLT: CPT | Mod: ZL | Performed by: PHYSICIAN ASSISTANT

## 2021-03-12 NOTE — PROGRESS NOTES
Nursing Notes:   Verna Marley LPN  3/12/2021 11:09 AM  Signed  Chief Complaint   Patient presents with     IUD     removal         Medication Reconciliation: complete    Verna Marley LPN        HPI:    Alexy Carrion is a 30 year old female who presents for IUD removal.  Patient is currently looking to get pregnant in the near future.  She would like her IUD removed.  Patient does have history of bacterial vaginosis that has been persistent.  Declines testing today.  No STD concerns.  Patient needs her Pap test updated.  No rashes.  Otherwise feeling well.    Past Medical History:   Diagnosis Date     Personal history of other medical treatment (CODE)     No Comments Provided       Past Surgical History:   Procedure Laterality Date     OTHER SURGICAL HISTORY      625110,OTHER,age 4       No family history on file.    Social History     Tobacco Use     Smoking status: Never Smoker     Smokeless tobacco: Never Used   Substance Use Topics     Alcohol use: No     Alcohol/week: 0.0 standard drinks     Comment: Alcoholic Drinks/day: rarely       Current Outpatient Medications   Medication Sig Dispense Refill     EPINEPHrine (ANY BX GENERIC EQUIV) 0.3 MG/0.3ML injection 2-pack Inject 0.3 mLs (0.3 mg) into the muscle as needed for anaphylaxis 0.6 mL 11       Allergies   Allergen Reactions     Aspirin Anaphylaxis and Hives     Other reaction(s): Angioedema  Swelling of throat and nausea  Other reaction(s): Wheezing     Doxycycline Anaphylaxis and GI Disturbance     Wasp Venom Protein Anaphylaxis     Clindamycin      Other reaction(s): Constipation  Nausea and Vomiting.       REVIEW OF SYSTEMS:  Refer to HPI.    EXAM:   Vitals:    /80 (BP Location: Right arm, Patient Position: Sitting, Cuff Size: Adult Large)   Pulse 78   Temp 98.4  F (36.9  C)   Resp 20   Wt 110 kg (242 lb 9.6 oz)   LMP  (LMP Unknown)   BMI 35.83 kg/m      General Appearance: Pleasant, alert, appropriate appearance for  age. No acute distress  Chest/Respiratory Exam: Normal chest wall and respirations. Clear to auscultation.  Cardiovascular Exam: Regular rate and rhythm. S1, S2, no murmur, click, gallop, or rubs.  Genitourinary Exam Female: External genitalia, vulva and vagina appear normal.  IUD strings appreciated in the cervix.  Removed IUD with a ring forceps.  Patient tolerated procedure well.  No complications were appreciated.  Bimanual exam reveals normal uterus and adnexa, nontender urethra and bladder. No cervical motion tenderness.  Pap completed: Yes  Skin: no rash or abnormalities  Psychiatric Exam: Alert and oriented - appropriate affect.    PHQ Depression Screen  PHQ-9 SCORE 10/24/2016 2/22/2018   PHQ-9 Total Score 0 0       ASSESSMENT AND PLAN:      ICD-10-CM    1. Pap test, as part of routine gynecological examination  Z01.419 HPV High Risk Types DNA Cervical     Pap Screen Thin Prep with HPV - recommended age 30 - 65 years (select HPV order below)   2. Encounter for IUD removal  Z30.432 REMOVE INTRAUTERINE DEVICE         Completed Pap and HPV for cervical cancer screening.  Results are pending.  Removed IUD.  Patient tolerated procedure well.  No complications were appreciated.  Recheck as needed.    Mayda Garnica PA-C PA-C..................3/12/2021 11:09 AM

## 2021-03-12 NOTE — NURSING NOTE
Chief Complaint   Patient presents with     IUD     removal         Medication Reconciliation: complete    Verna Marley, LPN

## 2021-03-18 LAB
COPATH REPORT: NORMAL
PAP: NORMAL

## 2021-03-23 LAB
FINAL DIAGNOSIS: NORMAL
HPV HR 12 DNA CVX QL NAA+PROBE: NEGATIVE
HPV16 DNA SPEC QL NAA+PROBE: NEGATIVE
HPV18 DNA SPEC QL NAA+PROBE: NEGATIVE
SPECIMEN DESCRIPTION: NORMAL
SPECIMEN SOURCE CVX/VAG CYTO: NORMAL

## 2021-08-03 ENCOUNTER — OFFICE VISIT (OUTPATIENT)
Dept: FAMILY MEDICINE | Facility: OTHER | Age: 31
End: 2021-08-03
Attending: NURSE PRACTITIONER
Payer: COMMERCIAL

## 2021-08-03 VITALS
BODY MASS INDEX: 34.07 KG/M2 | HEART RATE: 75 BPM | HEIGHT: 68 IN | WEIGHT: 224.8 LBS | DIASTOLIC BLOOD PRESSURE: 72 MMHG | OXYGEN SATURATION: 99 % | RESPIRATION RATE: 18 BRPM | SYSTOLIC BLOOD PRESSURE: 120 MMHG | TEMPERATURE: 98.4 F

## 2021-08-03 DIAGNOSIS — L01.00 IMPETIGO: Primary | ICD-10-CM

## 2021-08-03 PROCEDURE — G0463 HOSPITAL OUTPT CLINIC VISIT: HCPCS

## 2021-08-03 PROCEDURE — 99213 OFFICE O/P EST LOW 20 MIN: CPT | Performed by: PHYSICIAN ASSISTANT

## 2021-08-03 RX ORDER — MUPIROCIN 20 MG/G
OINTMENT TOPICAL 3 TIMES DAILY
Qty: 15 G | Refills: 0 | Status: SHIPPED | OUTPATIENT
Start: 2021-08-03 | End: 2024-10-01

## 2021-08-03 ASSESSMENT — MIFFLIN-ST. JEOR: SCORE: 1783.19

## 2021-08-03 ASSESSMENT — PAIN SCALES - GENERAL: PAINLEVEL: NO PAIN (0)

## 2021-08-03 NOTE — PATIENT INSTRUCTIONS
Patient Education     Understanding Impetigo  Impetigo is a common bacterial infection of the skin. It most often affects the face, arms, and legs. But it can appear on any part of the body. Anyone can have it, regardless of age. But it's most common in children. Impetigo is very contagious. This means it spreads easily to other people.    How to say it  [id number]  What causes impetigo?   Many types of bacteria live on normal, healthy skin. The bacteria usually don t cause problems. Impetigo happens when bacteria enter the skin through a scratch, break, sore, bite, or irritated spot. They then begin to grow out of control, leading to infection. The two most common bacteria causing impetigo are Staphylococcus and Streptococcus. In certain cases, impetigo appears on skin that has no visible break. It may be more likely to occur on skin that has another skin problem, such as eczema. It may also be more common after a cold or other virus.   Symptoms of impetigo   Symptoms of this problem include:    Small, fluid-filled blisters on the skin that may itch, ooze, or crust    A yellow, honey-colored crust on the infected skin    Skin sores that spread with scratching    An itchy rash that spreads with scratching    Swollen lymph nodes  Treatment for impetigo   The goal is to treat the infection and prevent it from spreading to others.    You will likely be given an antibiotic to treat the infection. This may be a cream or ointment to put on your skin. You usually need to use the cream or ointment for about 5 days. If the infection is severe or spreading, you may be given antibiotic medicine to take by mouth. Be sure to use this medicine as directed. Don't stop using it until you are told to stop, even if your skin gets better. If you stop too soon, the infection may come back and be harder to treat.    Try not to scratch or pick at your sores. It may help to cover affected areas with a bandage.    To prevent spreading  the infection, wash your hands often. Avoid sharing personal items, towels, clothes, pillows, and sheets with others. After each use, wash these items in hot water.    Clean the affected skin several times a day. Don t scrub. Instead, soak the area in warm, soapy water. This will help remove the crust that forms. For places that you can't soak, such as the face, place a clean, warm (not hot) washcloth on the affected area. Use a new washcloth and towel each time.  When to call your healthcare provider   Call your healthcare provider right away if you have any of these:    Fever of 100.4 F (38 C) or higher, or as directed by your healthcare provider    Increasing number of sores or spreading areas of redness after 2 days of treatment with antibiotics    Increasing swelling or pain    Increased amounts of fluid or pus coming from the sores    Unusual drowsiness, weakness, or change in behavior    Loss of appetite or vomiting  Deepa last reviewed this educational content on 6/1/2019 2000-2021 [employer]. All rights reserved. This information is not intended as a substitute for professional medical care. Always follow your healthcare professional's instructions.

## 2021-08-03 NOTE — PROGRESS NOTES
ASSESSMENT/PLAN:    I have reviewed the nursing notes.  I have reviewed the findings, diagnosis, plan and need for follow up with the patient.    (L01.00) Impetigo  (primary encounter diagnosis)  Comment: see below  Plan: mupirocin (BACTROBAN) 2 % external ointment        Vital signs stable. PE consistent with impetigo of face distal to left lip. Discussed anatomy, pathophysiology and typical treatment plan. Will prescribe Bactroban ointment. Good hand washing. Avoid sharing personal items such as make up, towels, pillows, etc. Clean skin but do not scrub. If fevers, chills, worsening rash occurs, encouraged to follow up. Patient is in agreement and understanding of the above treatment plan. All questions and concerns were addressed and answered to patient's satisfaction. AVS reviewed with patient.     Discussed warning signs/symptoms indicative of need to f/u    Follow up if symptoms persist or worsen or concerns    I explained my diagnostic considerations and recommendations to the patient, who voiced understanding and agreement with the treatment plan. All questions were answered. We discussed potential side effects of any prescribed or recommended therapies, as well as expectations for response to treatments.    Melnaie Matthews PA-C  8/3/2021  12:46 PM    HPI:    Alexy Carrion is a 30 year old female  who presents to [facility] today for concerns of sore on left bottom aspect of mouth. Unsure what caused rash/spot. No drainage noted. She first noticed the spot on Friday PM. No new dyes, perfumes, detergents. Diet has changed a bit - more greasy foods recently, unsure if due to this. No other symptoms to report. She has tried neosporin - dried spot out. No known exposures to impetigo, history in the past.     Allergies: ASA, Doxycycline, Wasp, Cleocin    PCP: MD Pb    Past Medical History:   Diagnosis Date     Personal history of other medical treatment (CODE)     No Comments Provided     Past Surgical  "History:   Procedure Laterality Date     OTHER SURGICAL HISTORY      506123,OTHER,age 4     Social History     Tobacco Use     Smoking status: Never Smoker     Smokeless tobacco: Never Used   Substance Use Topics     Alcohol use: No     Alcohol/week: 0.0 standard drinks     Comment: Alcoholic Drinks/day: rarely     Current Outpatient Medications   Medication Sig Dispense Refill     EPINEPHrine (ANY BX GENERIC EQUIV) 0.3 MG/0.3ML injection 2-pack Inject 0.3 mLs (0.3 mg) into the muscle as needed for anaphylaxis (Patient not taking: Reported on 8/3/2021) 0.6 mL 11     Allergies   Allergen Reactions     Aspirin Anaphylaxis and Hives     Other reaction(s): Angioedema  Swelling of throat and nausea  Other reaction(s): Wheezing     Doxycycline Anaphylaxis and GI Disturbance     Wasp Venom Protein Anaphylaxis     Clindamycin      Other reaction(s): Constipation  Nausea and Vomiting.     Past medical history, past surgical history, current medications and allergies reviewed and accurate to the best of my knowledge.      ROS:  Refer to HPI    /72   Pulse 75   Temp 98.4  F (36.9  C) (Tympanic)   Resp 18   Ht 1.727 m (5' 8\")   Wt 102 kg (224 lb 12.8 oz)   SpO2 99%   BMI 34.18 kg/m      EXAM:  General Appearance: Well appearing 30-year old female, appropriate appearance for age. No acute distress  Respiratory: normal chest wall and respirations.  Normal effort.  Clear to auscultation bilaterally, no wheezing, crackles or rhonchi.  No increased work of breathing.  No cough appreciated.  Cardiac: RRR with no murmurs  Dermatological: 1 cm honey crusted colored lesion, dried out appearance to distal left lip. No weeping noted  Psychological: normal affect, alert, oriented, and pleasant.     Labs:  None     Xray:  None     "

## 2021-08-03 NOTE — NURSING NOTE
"Chief Complaint   Patient presents with     Derm Problem     lower left side of mouth     Patient is here for a sore on the lower left side of her mouth that was noticed Friday. Patient states she has been using cortisone cream and neosporin on the area.     Initial /72   Pulse 75   Temp 98.4  F (36.9  C) (Tympanic)   Resp 18   Ht 1.727 m (5' 8\")   Wt 102 kg (224 lb 12.8 oz)   SpO2 99%   BMI 34.18 kg/m   Estimated body mass index is 34.18 kg/m  as calculated from the following:    Height as of this encounter: 1.727 m (5' 8\").    Weight as of this encounter: 102 kg (224 lb 12.8 oz).  Medication Reconciliation: complete    Jes Mcclure LPN  "

## 2021-08-13 ENCOUNTER — HOSPITAL ENCOUNTER (EMERGENCY)
Facility: OTHER | Age: 31
Discharge: HOME | End: 2021-08-13
Attending: FAMILY MEDICINE | Admitting: FAMILY MEDICINE
Payer: COMMERCIAL

## 2021-08-13 ENCOUNTER — APPOINTMENT (OUTPATIENT)
Dept: ULTRASOUND IMAGING | Facility: OTHER | Age: 31
End: 2021-08-13
Attending: FAMILY MEDICINE
Payer: COMMERCIAL

## 2021-08-13 VITALS
TEMPERATURE: 97 F | OXYGEN SATURATION: 100 % | SYSTOLIC BLOOD PRESSURE: 125 MMHG | DIASTOLIC BLOOD PRESSURE: 82 MMHG | HEART RATE: 89 BPM | BODY MASS INDEX: 33.95 KG/M2 | RESPIRATION RATE: 18 BRPM | HEIGHT: 68 IN | WEIGHT: 224 LBS

## 2021-08-13 DIAGNOSIS — Z32.01 PREGNANCY TEST POSITIVE: ICD-10-CM

## 2021-08-13 DIAGNOSIS — O30.001 TWIN GESTATION IN FIRST TRIMESTER, UNSPECIFIED MULTIPLE GESTATION TYPE: ICD-10-CM

## 2021-08-13 LAB
ALBUMIN UR-MCNC: NEGATIVE MG/DL
APPEARANCE UR: CLEAR
B-HCG SERPL-ACNC: 3885 IU/L
BASOPHILS # BLD AUTO: 0 10E3/UL (ref 0–0.2)
BASOPHILS NFR BLD AUTO: 0 %
BILIRUB UR QL STRIP: NEGATIVE
COLOR UR AUTO: ABNORMAL
CRP SERPL HS-MCNC: 3.3 MG/L
EOSINOPHIL # BLD AUTO: 0.1 10E3/UL (ref 0–0.7)
EOSINOPHIL NFR BLD AUTO: 1 %
ERYTHROCYTE [DISTWIDTH] IN BLOOD BY AUTOMATED COUNT: 12.8 % (ref 10–15)
GLUCOSE UR STRIP-MCNC: NEGATIVE MG/DL
HCG UR QL: POSITIVE
HCT VFR BLD AUTO: 35.7 % (ref 35–47)
HGB BLD-MCNC: 11.8 G/DL (ref 11.7–15.7)
HGB UR QL STRIP: NEGATIVE
HOLD SPECIMEN: NORMAL
IMM GRANULOCYTES # BLD: 0 10E3/UL
IMM GRANULOCYTES NFR BLD: 0 %
KETONES UR STRIP-MCNC: NEGATIVE MG/DL
LEUKOCYTE ESTERASE UR QL STRIP: ABNORMAL
LYMPHOCYTES # BLD AUTO: 1.8 10E3/UL (ref 0.8–5.3)
LYMPHOCYTES NFR BLD AUTO: 17 %
MCH RBC QN AUTO: 29.1 PG (ref 26.5–33)
MCHC RBC AUTO-ENTMCNC: 33.1 G/DL (ref 31.5–36.5)
MCV RBC AUTO: 88 FL (ref 78–100)
MONOCYTES # BLD AUTO: 0.7 10E3/UL (ref 0–1.3)
MONOCYTES NFR BLD AUTO: 7 %
MUCOUS THREADS #/AREA URNS LPF: PRESENT /LPF
NEUTROPHILS # BLD AUTO: 8.1 10E3/UL (ref 1.6–8.3)
NEUTROPHILS NFR BLD AUTO: 75 %
NITRATE UR QL: NEGATIVE
NRBC # BLD AUTO: 0 10E3/UL
NRBC BLD AUTO-RTO: 0 /100
PH UR STRIP: 6 [PH] (ref 5–9)
PLATELET # BLD AUTO: 218 10E3/UL (ref 150–450)
RBC # BLD AUTO: 4.05 10E6/UL (ref 3.8–5.2)
RBC URINE: 1 /HPF
SP GR UR STRIP: 1.02 (ref 1–1.03)
UROBILINOGEN UR STRIP-MCNC: NORMAL MG/DL
WBC # BLD AUTO: 10.9 10E3/UL (ref 4–11)
WBC URINE: 3 /HPF

## 2021-08-13 PROCEDURE — 99283 EMERGENCY DEPT VISIT LOW MDM: CPT | Performed by: FAMILY MEDICINE

## 2021-08-13 PROCEDURE — 250N000013 HC RX MED GY IP 250 OP 250 PS 637: Performed by: FAMILY MEDICINE

## 2021-08-13 PROCEDURE — 36415 COLL VENOUS BLD VENIPUNCTURE: CPT | Performed by: FAMILY MEDICINE

## 2021-08-13 PROCEDURE — 76817 TRANSVAGINAL US OBSTETRIC: CPT | Mod: TC

## 2021-08-13 PROCEDURE — 81025 URINE PREGNANCY TEST: CPT | Performed by: FAMILY MEDICINE

## 2021-08-13 PROCEDURE — 99284 EMERGENCY DEPT VISIT MOD MDM: CPT | Mod: 25 | Performed by: FAMILY MEDICINE

## 2021-08-13 PROCEDURE — 84702 CHORIONIC GONADOTROPIN TEST: CPT | Performed by: FAMILY MEDICINE

## 2021-08-13 PROCEDURE — 87086 URINE CULTURE/COLONY COUNT: CPT | Performed by: FAMILY MEDICINE

## 2021-08-13 PROCEDURE — 86141 C-REACTIVE PROTEIN HS: CPT | Performed by: FAMILY MEDICINE

## 2021-08-13 PROCEDURE — 81001 URINALYSIS AUTO W/SCOPE: CPT | Performed by: FAMILY MEDICINE

## 2021-08-13 PROCEDURE — 85025 COMPLETE CBC W/AUTO DIFF WBC: CPT | Performed by: FAMILY MEDICINE

## 2021-08-13 RX ORDER — ACETAMINOPHEN 325 MG/1
650 TABLET ORAL ONCE
Status: COMPLETED | OUTPATIENT
Start: 2021-08-13 | End: 2021-08-13

## 2021-08-13 RX ADMIN — ACETAMINOPHEN 650 MG: 325 TABLET, FILM COATED ORAL at 00:47

## 2021-08-13 ASSESSMENT — ENCOUNTER SYMPTOMS
NAUSEA: 0
CHEST TIGHTNESS: 0
ACTIVITY CHANGE: 0
VOMITING: 0
ABDOMINAL PAIN: 1
APPETITE CHANGE: 1
SHORTNESS OF BREATH: 0

## 2021-08-13 ASSESSMENT — MIFFLIN-ST. JEOR: SCORE: 1779.56

## 2021-08-13 NOTE — ED PROVIDER NOTES
History     Chief Complaint   Patient presents with     Abdominal Pain     Vaginal Bleeding     HPI  Alexy Carrion is a 30 year old female who presents the emergency room with lower abdominal pain and cramping that started around 3 AM yesterday morning.  Patient states that she had her IUD removed in February and her last menstrual period was the end of June or early July.  She does not think that she is pregnant.  She has not had typical symptoms that she would have had with pregnancy.  No nausea or vomiting just significant lower abdominal pain.    Allergies:  Allergies   Allergen Reactions     Aspirin Anaphylaxis and Hives     Other reaction(s): Angioedema  Swelling of throat and nausea  Other reaction(s): Wheezing     Doxycycline Anaphylaxis and GI Disturbance     Wasp Venom Protein Anaphylaxis     Clindamycin      Other reaction(s): Constipation  Nausea and Vomiting.       Problem List:    Patient Active Problem List    Diagnosis Date Noted     Attention deficit disorder 02/12/2018     Priority: Medium     Herpes labialis 12/07/2017     Priority: Medium     Non morbid obesity due to excess calories 06/20/2017     Priority: Medium     History of twin pregnancy in prior pregnancy 06/20/2017     Priority: Medium     History of postpartum hemorrhage, currently pregnant, first trimester 06/20/2017     Priority: Medium     Bee sting allergy 08/11/2015     Priority: Medium     Trauma 05/22/2014     Priority: Medium     Gall stones 03/13/2014     Priority: Medium     Overview:   Incidental on OB ultrasound       Myofascial muscle pain 09/28/2013     Priority: Medium     Spasm of paraspinal muscle 09/28/2013     Priority: Medium     Trigger point of thoracic region 09/28/2013     Priority: Medium     Bipolar I disorder (H) 04/07/2011     Priority: Medium        Past Medical History:    Past Medical History:   Diagnosis Date     Personal history of other medical treatment (CODE)        Past Surgical History:    Past  "Surgical History:   Procedure Laterality Date     OTHER SURGICAL HISTORY      [id number],OTHER,age 4       Family History:    No family history on file.    Social History:  Marital Status:  Legally  [3]  Social History     Tobacco Use     Smoking status: Never Smoker     Smokeless tobacco: Never Used   Vaping Use     Vaping Use: Some days     Substances: Nicotine   Substance Use Topics     Alcohol use: No     Alcohol/week: 0.0 standard drinks     Comment: Alcoholic Drinks/day: rarely     Drug use: No     Comment: Drug use: No        Medications:    EPINEPHrine (ANY BX GENERIC EQUIV) 0.3 MG/0.3ML injection 2-pack  mupirocin (BACTROBAN) 2 % external ointment          Review of Systems   Constitutional: Positive for appetite change. Negative for activity change.   Respiratory: Negative for chest tightness and shortness of breath.    Cardiovascular: Negative for chest pain.   Gastrointestinal: Positive for abdominal pain. Negative for nausea and vomiting.       Physical Exam   BP: 125/82  Pulse: 89  Temp: 97  F (36.1  C)  Resp: 18  Height: 172.7 cm (5' 8\")  Weight: 101.6 kg (224 lb)  SpO2: 100 %      Physical Exam  Vitals and nursing note reviewed.   Constitutional:       Appearance: She is well-developed.   HENT:      Head: Normocephalic and atraumatic.   Cardiovascular:      Rate and Rhythm: Normal rate and regular rhythm.      Heart sounds: Normal heart sounds.   Pulmonary:      Effort: Pulmonary effort is normal.      Breath sounds: Normal breath sounds.   Abdominal:      General: Abdomen is flat. Bowel sounds are decreased. There is no distension.      Palpations: Abdomen is soft.      Tenderness: There is abdominal tenderness in the right lower quadrant and suprapubic area. There is no guarding or rebound.   Skin:     General: Skin is warm and dry.   Neurological:      Mental Status: She is alert.       ED Course   Patient seen and examined.  Urine pregnancy test is pending.  We will give her Tylenol 650 " orally for pain and get a CBC,CRP done.    Pregnancy test is positive.Will get OBUS to assess for intrauterine pregnancy.    Intrauterine gestational sacs x 2 seen. Patient informed.   WBC normal and CRP normal. Doubt appy.but told patient to return if increased pain.   Discussed possible cramping from implantation, early miscarriage, early appy. If cannot tolerate should return to er for re-evaluation. She was comfortable with this plan. Discussed plans for pregnancy should she decide to continue the pregnancy. 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 She already as five other children in a 3 bd home. May need referral to  if she decides to continue the pregnancy for assistance.      Patient was anxious for discharge- will contact her if official report is different than tech's report. Verified phone number with her. She was comfortable with that plan.     3:24 AM US consistent with information given to patient.      Procedures        Results for orders placed or performed during the hospital encounter of 08/13/21 (from the past 24 hour(s))   UA with Microscopic reflex to Culture    Specimen: Urine, Midstream   Result Value Ref Range    Color Urine Light Yellow Colorless, Straw, Light Yellow, Yellow    Appearance Urine Clear Clear    Glucose Urine Negative Negative mg/dL    Bilirubin Urine Negative Negative    Ketones Urine Negative Negative mg/dL    Specific Gravity Urine 1.024 1.000 - 1.030    Blood Urine Negative Negative    pH Urine 6.0 5.0 - 9.0    Protein Albumin Urine Negative Negative mg/dL    Urobilinogen Urine Normal Normal, 2.0 mg/dL    Nitrite Urine Negative Negative    Leukocyte Esterase Urine Moderate (A) Negative    Mucus Urine Present (A) None Seen /LPF    RBC Urine 1 <=2 /HPF    WBC Urine 3 <=5 /HPF    Narrative    Urine Culture ordered based on laboratory  criteria   HCG qualitative urine   Result Value Ref Range    hCG Urine Qualitative Positive (A) Negative   CBC with platelets differential    Narrative    The following orders were created for panel order CBC with platelets differential.  Procedure                               Abnormality         Status                     ---------                               -----------         ------                     CBC with platelets and d...[[id number]]                      Final result                 Please view results for these tests on the individual orders.   C-Reactive Protein high sensitivity GH   Result Value Ref Range    C-Reactive Protein High Sensitivity 3.3 mg/L   HCG quantitative pregnancy (blood)   Result Value Ref Range    hCG Quantitative 3,885 IU/L   Extra Tube    Narrative    The following orders were created for panel order Extra Tube.  Procedure                               Abnormality         Status                     ---------                               -----------         ------                     Extra Blue Top Tube[[id number]]                              Final result               Extra Serum Separator Tu...[[id number]]                      Final result               Extra Green Top (Lithium...[[id number]]                      Final result                 Please view results for these tests on the individual orders.   Extra Blue Top Tube   Result Value Ref Range    Hold Specimen JIC    Extra Serum Separator Tube (SST)   Result Value Ref Range    Hold Specimen JIC    Extra Green Top (Lithium Heparin) ON ICE   Result Value Ref Range    Hold Specimen JIC    CBC with platelets and differential   Result Value Ref Range    WBC Count 10.9 4.0 - 11.0 10e3/uL    RBC Count 4.05 3.80 - 5.20 10e6/uL    Hemoglobin 11.8 11.7 - 15.7 g/dL    Hematocrit 35.7 35.0 - 47.0 %    MCV 88 78 - 100 fL    MCH 29.1 26.5 - 33.0 pg    MCHC 33.1 31.5 - 36.5 g/dL    RDW 12.8 10.0 - 15.0 %    Platelet Count 218 150 - 450  10e3/uL    % Neutrophils 75 %    % Lymphocytes 17 %    % Monocytes 7 %    % Eosinophils 1 %    % Basophils 0 %    % Immature Granulocytes 0 %    NRBCs per 100 WBC 0 <1 /100    Absolute Neutrophils 8.1 1.6 - 8.3 10e3/uL    Absolute Lymphocytes 1.8 0.8 - 5.3 10e3/uL    Absolute Monocytes 0.7 0.0 - 1.3 10e3/uL    Absolute Eosinophils 0.1 0.0 - 0.7 10e3/uL    Absolute Basophils 0.0 0.0 - 0.2 10e3/uL    Absolute Immature Granulocytes 0.0 <=0.0 10e3/uL    Absolute NRBCs 0.0 10e3/uL   US OB Transvaginal Only    Narrative    PROCEDURE INFORMATION:   Exam: US Pregnancy, Transvaginal   Exam date and time: 8/13/2021 1:15 AM   Age: 30 years old   Clinical indication: Pregnancy complicated by abdominal or pelvic pain; Right   lower quadrant; First trimester; Gestational age or lmp: ? ; Pregnant;   Additional info: Unknown lmp- has pain and cramping with rlq pain. Slight   bleeding.     TECHNIQUE:   Imaging protocol: Real-time transvaginal obstetrical ultrasound of the maternal   pelvis with image documentation. Transvaginal imaging was used for better   evaluation of the fetus, adnexa, and/or cervix.     COMPARISON:   No relevant prior studies available.     FINDINGS:   Gestation: 2 gestational sacs with probable yolk sacs are seen within the   uterus. No fetal poles or fetal heart tones are identified.     BIOMETRY:   Gestational age (AUA): For both of the gestational sacs, mean sac diameter of 5   mm corresponds to gestational age of 5 weeks 0 days.     MATERNAL:   Uterus:  No acute findings.  Cervix is closed.  Right adnexa: Right ovary is normal in morphology with normal ovarian blood   flow; no extra ovarian adnexal mass   Left adnexa: Likely corpus luteum in the left ovary with normal ovarian blood   flow; no extra ovarian adnexal mass       Impression    IMPRESSION:   Two gestational sacs are seen within the uterus, likely twin gestation.  No   fetal pole or fetal heart tones are identified.    Estimated gestational age  of 5 weeks 0 days predicts an approximate birth date   of April 15, 2023.    No evidence for ovarian torsion.      THIS DOCUMENT HAS BEEN ELECTRONICALLY SIGNED BY JIMY KIDD MD       Medications   acetaminophen (TYLENOL) tablet 650 mg (650 mg Oral Given 8/13/21 0047)       Assessments & Plan (with Medical Decision Making)     I have reviewed the nursing notes.    I have reviewed the findings, diagnosis, plan and need for follow up with the patient.      Discharge Medication List as of 8/13/2021  2:16 AM          Final diagnoses:   Pregnancy test positive   Twin gestation in first trimester, unspecified multiple gestation type       8/13/2021   [facility]  08/13/21 4006

## 2021-08-13 NOTE — ED TRIAGE NOTES
ED Nursing Triage Note (General)   ________________________________    Alexy Carrion is a 30 year old Female that presents to triage private car  With history of  experiencing severe cramps. This started yesterday at 0300. Pt says abdominal cramps are generalized. Pt had 1 episode of noticing blood when she went to the bathroom, pt says it was from her vaginal area. Pt denies this episode as being like her regular period. Pt denies being pregnant.   There were no vitals taken for this visit.t  Patient appears alert  and oriented, in no acute distress., and cooperative and pleasant behavior.    GCS Total = 15  Airway: intact  Breathing noted as Normal  Circulation Normal  Skin:  Normal  Action taken:  Triage to critical care immediately      PRE HOSPITAL PRIOR LIVING SITUATION Spouse and Spouse and Children

## 2021-08-14 LAB — BACTERIA UR CULT: NORMAL

## 2021-09-11 ENCOUNTER — OFFICE VISIT (OUTPATIENT)
Dept: FAMILY MEDICINE | Facility: OTHER | Age: 31
End: 2021-09-11
Payer: COMMERCIAL

## 2021-09-11 VITALS
DIASTOLIC BLOOD PRESSURE: 60 MMHG | HEIGHT: 68 IN | OXYGEN SATURATION: 98 % | SYSTOLIC BLOOD PRESSURE: 124 MMHG | TEMPERATURE: 99.1 F | HEART RATE: 78 BPM | WEIGHT: 223.31 LBS | RESPIRATION RATE: 18 BRPM | BODY MASS INDEX: 33.84 KG/M2

## 2021-09-11 DIAGNOSIS — T63.441A ALLERGIC REACTION TO BEE STING: ICD-10-CM

## 2021-09-11 DIAGNOSIS — G43.011 INTRACTABLE MIGRAINE WITHOUT AURA AND WITH STATUS MIGRAINOSUS: Primary | ICD-10-CM

## 2021-09-11 DIAGNOSIS — O30.001 TWIN GESTATION IN FIRST TRIMESTER, UNSPECIFIED MULTIPLE GESTATION TYPE: ICD-10-CM

## 2021-09-11 PROCEDURE — 250N000013 HC RX MED GY IP 250 OP 250 PS 637: Performed by: PHYSICIAN ASSISTANT

## 2021-09-11 PROCEDURE — G0463 HOSPITAL OUTPT CLINIC VISIT: HCPCS

## 2021-09-11 PROCEDURE — 99213 OFFICE O/P EST LOW 20 MIN: CPT | Performed by: PHYSICIAN ASSISTANT

## 2021-09-11 RX ORDER — ACETAMINOPHEN 325 MG/1
650 TABLET ORAL ONCE
Status: CANCELLED | OUTPATIENT
Start: 2021-09-11 | End: 2021-09-11

## 2021-09-11 RX ORDER — SUMATRIPTAN 6 MG/.5ML
6 INJECTION, SOLUTION SUBCUTANEOUS ONCE
Status: CANCELLED | OUTPATIENT
Start: 2021-09-11 | End: 2021-09-11

## 2021-09-11 RX ORDER — OLANZAPINE 5 MG/1
10 TABLET, ORALLY DISINTEGRATING ORAL ONCE
Status: COMPLETED | OUTPATIENT
Start: 2021-09-11 | End: 2021-09-11

## 2021-09-11 RX ORDER — EPINEPHRINE 0.3 MG/.3ML
0.3 INJECTION SUBCUTANEOUS
Qty: 0.6 ML | Refills: 11 | Status: SHIPPED | OUTPATIENT
Start: 2021-09-11 | End: 2024-10-01

## 2021-09-11 RX ORDER — METOCLOPRAMIDE 10 MG/1
10 TABLET ORAL ONCE
Status: COMPLETED | OUTPATIENT
Start: 2021-09-11 | End: 2021-09-11

## 2021-09-11 RX ORDER — METOCLOPRAMIDE 5 MG/1
5 TABLET ORAL
Qty: 30 TABLET | Refills: 0 | Status: SHIPPED | OUTPATIENT
Start: 2021-09-11 | End: 2022-01-04

## 2021-09-11 RX ADMIN — OLANZAPINE 10 MG: 5 TABLET, ORALLY DISINTEGRATING ORAL at 12:05

## 2021-09-11 RX ADMIN — METOCLOPRAMIDE 10 MG: 10 TABLET ORAL at 12:04

## 2021-09-11 ASSESSMENT — PAIN SCALES - GENERAL: PAINLEVEL: WORST PAIN (10)

## 2021-09-11 ASSESSMENT — MIFFLIN-ST. JEOR: SCORE: 1771.44

## 2021-09-11 NOTE — NURSING NOTE
Patient presents to clinic experiencing migraines for past 4 days, unable to sleep well and working 80 hours per week.  She states migraine pain start behind both ears and radiates up into head.  Pain rated 10 and is worsened by bright lights.    Medication Reconciliation: complete    Zo Lindsey, ELLAN

## 2021-09-11 NOTE — PROGRESS NOTES
ASSESSMENT/PLAN:    I have reviewed the nursing notes.  I have reviewed the findings, diagnosis, plan and need for follow up with the patient.    1. Intractable migraine without aura and with status migrainosus  - metoclopramide (REGLAN) 5 MG tablet; Take 1 tablet (5 mg) by mouth 4 times daily (before meals and nightly)  Dispense: 30 tablet; Refill: 0  - OLANZapine zydis (zyPREXA) ODT tab 10 mg  - metoclopramide (REGLAN) tablet 10 mg  -Vital signs stable.  Exam consistent with irretractable migraine, refer to consult note below.  Normal neurologic and remainder of physical exam as noted below.  Patient was given Reglan and Zyprexa while in the office.  She was discharged home with a prescription for Reglan and she can use Tylenol as needed as long as she does not exceed daily limits (4000 mg) which are reviewed during office visit today.  She should avoid NSAIDs and other nonsafe abortive migraine medications during pregnancy.  If worsening signs or symptoms occur she is agreeable to return for reevaluation. Patient is in agreement and understanding of the above treatment plan. All questions and concerns were addressed and answered to patient's satisfaction. AVS reviewed with patient.     2. Allergic reaction to bee sting  - EPINEPHrine (ANY BX GENERIC EQUIV) 0.3 MG/0.3ML injection 2-pack; Inject 0.3 mLs (0.3 mg) into the muscle once as needed for anaphylaxis  Dispense: 0.6 mL; Refill: 11  - Out of EpiPen, refill to pharmacy. Reviewed safe storage and taking of RX.     3.  Twin gestation in first trimester, unspecified multiple gestation type  - In regards to pregnancy and concerns in regards to this I recommended patient follow-up with her OB/GYN.      Discussed warning signs/symptoms indicative of need to f/u    Follow up if symptoms persist or worsen or concerns    I explained my diagnostic considerations and recommendations to the patient, who voiced understanding and agreement with the treatment plan. All  questions were answered. We discussed potential side effects of any prescribed or recommended therapies, as well as expectations for response to treatments.    Melanie Matthews PA-C  9/11/2021  11:39 AM    HPI:    Alexy Carrion is a 31 year old female  who presents to [facility] today for concerns of Migraine headache, x 4-5 days. She was told she was approximately 5-6 weeks along as of 1 month prior.     Headache    Onset: 4-5 day(s) ago    Description:                Type: Migraine headache                 Location (unilateral/bilateral): bilateral neck to base of skull   Character: throbbing pain                 Frequency/Intensity:  Unchanged                 Pain scale rating:10/10                 Are headaches getting more intense or more frequent: no    Presence of aura: no    Precipitating and/or Alleviating factors:        How much caffeine do you use daily: YES- minimal  Does movement, bending over, increase pain: no  Does light/sound make it worse: YES  Does sleep help: no  Do you wake up with a headache or does it wake you from sleep: YES                 Are you able to do daily activities: no    Accompanying Signs & Symptoms:   Stiff neck: no  Fever: no  Confusion: no  Sinus pressure: no  Nausea or vomiting: no  Dizziness: no  Numbness: no  Weakness: no  Visual changes: no    History:    Any head trauma: no  Any previously history of headaches: no  Any family history of migraines: YES  Any previous tests for headaches: no  Have you seen a neurologist before: no  When was vision last checked: year ago  Do you wear glasses or contacts: glasses  How much caffeine do you drink (soda, tea, coffee, etc): see above  How often do you eat nitrate containing foods: ( hot dogs, processed meat or cheese, patino etc):  minimal  Does bright sunshine or light bring on headaches: yes  How much water/fluids do you drink: adequate   Women: presence of birth control: no  Women: LMP: unsure - see above      Medications  "tried and outcome:  4000 mg yesterday and 1000 mg today with minor relief    ANY OTHER CHANGE in MEDICATIONS: none    Past Medical History:   Diagnosis Date     Personal history of other medical treatment (CODE)     No Comments Provided     Past Surgical History:   Procedure Laterality Date     OTHER SURGICAL HISTORY      [id number],OTHER,age 4     Social History     Tobacco Use     Smoking status: Never Smoker     Smokeless tobacco: Never Used   Substance Use Topics     Alcohol use: Yes     Alcohol/week: 0.0 standard drinks     Comment: Alcoholic Drinks/day: rarely     Current Outpatient Medications   Medication Sig Dispense Refill     EPINEPHrine (ANY BX GENERIC EQUIV) 0.3 MG/0.3ML injection 2-pack Inject 0.3 mLs (0.3 mg) into the muscle as needed for anaphylaxis 0.6 mL 11     mupirocin (BACTROBAN) 2 % external ointment Apply topically 3 times daily 15 g 0     Allergies   Allergen Reactions     Aspirin Anaphylaxis and Hives     Other reaction(s): Angioedema  Swelling of throat and nausea  Other reaction(s): Wheezing     Doxycycline Anaphylaxis and GI Disturbance     Wasp Venom Protein Anaphylaxis     Clindamycin      Other reaction(s): Constipation  Nausea and Vomiting.     Past medical history, past surgical history, current medications and allergies reviewed and accurate to the best of my knowledge.      ROS:  Refer to HPI    /60 (BP Location: Left arm, Patient Position: Sitting, Cuff Size: Adult Large)   Pulse 78   Temp 99.1  F (37.3  C) (Tympanic)   Resp 18   Ht 1.727 m (5' 8\")   Wt 101.3 kg (223 lb 5 oz)   LMP 07/01/2021 (LMP Unknown)   SpO2 98%   Breastfeeding No   BMI 33.95 kg/m      EXAM:  General Appearance: Well appearing 31-year old female, appropriate appearance for age. No acute distress  Ears: Left TM intact, translucent with bony landmarks appreciated, no erythema, no effusion, no bulging, no purulence.  Right TM intact, translucent with bony landmarks appreciated, no erythema, no " effusion, no bulging, no purulence.  Left auditory canal clear.  Right auditory canal clear.  Normal external ears, non tender.  Eyes: conjunctivae normal without erythema or irritation, corneas clear, no drainage or crusting, no eyelid swelling, pupils equal   Orophayrnx: moist mucous membranes, posterior pharynx without erythema, tonsils without hypertrophy, no erythema, no exudates or petechiae, no post nasal drip seen, no trismus, voice clear.    Sinuses:  No sinus tenderness upon palpation of the frontal or maxillary sinuses  Nose:  Bilateral nares: no erythema, no edema, no drainage or congestion   Neck: supple without adenopathy  Respiratory: normal chest wall and respirations.  Normal effort.  Clear to auscultation bilaterally, no wheezing, crackles or rhonchi.  No increased work of breathing.  No cough appreciated.  Cardiac: RRR with no murmurs  Neurologic:  Awake, alert, oriented to name, place and time.  Cranial nerves II-XII are grossly intact.  Motor shows good stregth, bulk and tone bilaterally.  Cerebellar finger to nose, heel to shin intact.  Sensory is intact to fine touch and pin prick.  Babinski down going, Romberg negative, and gait is normal.  Motor Exam:  moves all extremities well and symmetrically  Coordination:  Finger/Nose:  Right:  normal  Heel-Knee-Shin:  Right:  normal  Rapid Alternating Movements:  Right:  normal  Fundoscopic Exam:  shows sharp margins and normal vascular pattern    Dermatological: no rashes noted of exposed skin  Psychological: normal affect, alert, oriented, and pleasant.     Labs:  None     Xray:  None     Consult: discussed with MOI PALMA to give Zyprexa one time dose in , [place] for pregnancy, recommend to follow up with OB in regards to pregnancy concerns.

## 2021-10-03 ENCOUNTER — HEALTH MAINTENANCE LETTER (OUTPATIENT)
Age: 31
End: 2021-10-03

## 2021-11-11 ENCOUNTER — ALLIED HEALTH/NURSE VISIT (OUTPATIENT)
Dept: FAMILY MEDICINE | Facility: OTHER | Age: 31
End: 2021-11-11
Attending: FAMILY MEDICINE
Payer: COMMERCIAL

## 2021-11-11 DIAGNOSIS — R09.89 CHEST CONGESTION: Primary | ICD-10-CM

## 2021-11-11 DIAGNOSIS — R50.9 FEVER, UNSPECIFIED: ICD-10-CM

## 2021-11-11 DIAGNOSIS — R05.9 COUGH: ICD-10-CM

## 2021-11-11 PROCEDURE — C9803 HOPD COVID-19 SPEC COLLECT: HCPCS

## 2021-11-11 PROCEDURE — U0005 INFEC AGEN DETEC AMPLI PROBE: HCPCS | Mod: ZL

## 2021-11-12 LAB — SARS-COV-2 RNA RESP QL NAA+PROBE: POSITIVE

## 2021-11-27 ENCOUNTER — ALLIED HEALTH/NURSE VISIT (OUTPATIENT)
Dept: FAMILY MEDICINE | Facility: OTHER | Age: 31
End: 2021-11-27
Attending: FAMILY MEDICINE
Payer: COMMERCIAL

## 2021-11-27 DIAGNOSIS — Z20.822 COVID-19 RULED OUT: Primary | ICD-10-CM

## 2021-11-27 PROCEDURE — U0003 INFECTIOUS AGENT DETECTION BY NUCLEIC ACID (DNA OR RNA); SEVERE ACUTE RESPIRATORY SYNDROME CORONAVIRUS 2 (SARS-COV-2) (CORONAVIRUS DISEASE [COVID-19]), AMPLIFIED PROBE TECHNIQUE, MAKING USE OF HIGH THROUGHPUT TECHNOLOGIES AS DESCRIBED BY CMS-2020-01-R: HCPCS | Mod: ZL

## 2021-11-27 PROCEDURE — C9803 HOPD COVID-19 SPEC COLLECT: HCPCS

## 2021-11-29 LAB — SARS-COV-2 RNA RESP QL NAA+PROBE: POSITIVE

## 2021-11-30 ENCOUNTER — TELEPHONE (OUTPATIENT)
Dept: FAMILY MEDICINE | Facility: OTHER | Age: 31
End: 2021-11-30
Payer: COMMERCIAL

## 2021-11-30 NOTE — TELEPHONE ENCOUNTER
"2nd positive Covid      Coronavirus (COVID-19) Notification    Caller Name (Patient, parent, daughter/son, grandparent, etc)  patient    Reason for call  Notify of Positive Coronavirus (COVID-19) lab results, assess symptoms,  review [facility] recommendations    Lab Result    Lab test:  2019-nCoV rRt-PCR or SARS-CoV-2 PCR    Oropharyngeal AND/OR nasopharyngeal swabs is POSITIVE for 2019-nCoV RNA/SARS-COV-2 PCR (COVID-19 virus)    RN Recommendations/Instructions per [facility] Coronavirus COVID-19 recommendations    Brief introduction script  Introduce self then review script:    \"I am calling on behalf of [employer].  We were notified that your Coronavirus test (COVID-19) for was POSITIVE for the virus.       Patient refuses all Covid education. Patient has No new or ongoing symptoms. Patient reports her employer, [employer] make there employees test every week because they are not vaccinated. Patient has No new concerns or questions @ this time.    A Positive COVID-19 letter will be sent via [employer] or the mail. (Exception, no letters sent to Presurgerical/Preprocedure Patients)    Shelia Hardin LPN    "

## 2021-11-30 NOTE — TELEPHONE ENCOUNTER
Coronavirus (COVID-19) Notification    Reason for call  Notify of POSITIVE  COVID-19 lab result, assess symptoms,  review [facility] recommendations    Lab Result   Lab test for 2019-nCoV rRt-PCR or SARS-COV-2 PCR  Oropharyngeal AND/OR nasopharyngeal swabs were POSITIVE for 2019-nCoV RNA [OR] SARS-COV-2 RNA (COVID-19) RNA     We have been unable to reach Patient by phone at this time to notify of their Positive COVID-19 result.  Left voicemail message requesting a call back to [phone] [facility] for results.        2nd positive Result.  POSITIVE COVID-19 Letter sent.    Nadine Mccullough LPN

## 2021-12-07 ENCOUNTER — OFFICE VISIT (OUTPATIENT)
Dept: FAMILY MEDICINE | Facility: OTHER | Age: 31
End: 2021-12-07
Attending: FAMILY MEDICINE
Payer: COMMERCIAL

## 2021-12-07 ENCOUNTER — TELEPHONE (OUTPATIENT)
Dept: FAMILY MEDICINE | Facility: OTHER | Age: 31
End: 2021-12-07

## 2021-12-07 VITALS
RESPIRATION RATE: 16 BRPM | BODY MASS INDEX: 35.55 KG/M2 | SYSTOLIC BLOOD PRESSURE: 128 MMHG | TEMPERATURE: 98.3 F | HEART RATE: 94 BPM | DIASTOLIC BLOOD PRESSURE: 74 MMHG | OXYGEN SATURATION: 100 % | WEIGHT: 233.8 LBS

## 2021-12-07 DIAGNOSIS — N93.9 EPISODE OF HEAVY VAGINAL BLEEDING: Primary | ICD-10-CM

## 2021-12-07 LAB
ABO/RH(D): NORMAL
B-HCG SERPL-ACNC: 3 IU/L
C TRACH DNA SPEC QL PROBE+SIG AMP: NEGATIVE
ERYTHROCYTE [DISTWIDTH] IN BLOOD BY AUTOMATED COUNT: 12.7 % (ref 10–15)
HCG UR QL: NEGATIVE
HCT VFR BLD AUTO: 33.4 % (ref 35–47)
HGB BLD-MCNC: 11.1 G/DL (ref 11.7–15.7)
MCH RBC QN AUTO: 28.8 PG (ref 26.5–33)
MCHC RBC AUTO-ENTMCNC: 33.2 G/DL (ref 31.5–36.5)
MCV RBC AUTO: 87 FL (ref 78–100)
N GONORRHOEA DNA SPEC QL NAA+PROBE: NEGATIVE
PLATELET # BLD AUTO: 207 10E3/UL (ref 150–450)
RBC # BLD AUTO: 3.86 10E6/UL (ref 3.8–5.2)
SPECIMEN EXPIRATION DATE: NORMAL
WBC # BLD AUTO: 9.7 10E3/UL (ref 4–11)

## 2021-12-07 PROCEDURE — 81025 URINE PREGNANCY TEST: CPT | Mod: ZL | Performed by: FAMILY MEDICINE

## 2021-12-07 PROCEDURE — 85027 COMPLETE CBC AUTOMATED: CPT | Mod: ZL | Performed by: FAMILY MEDICINE

## 2021-12-07 PROCEDURE — 84702 CHORIONIC GONADOTROPIN TEST: CPT | Mod: ZL | Performed by: FAMILY MEDICINE

## 2021-12-07 PROCEDURE — 87491 CHLMYD TRACH DNA AMP PROBE: CPT | Mod: ZL | Performed by: FAMILY MEDICINE

## 2021-12-07 PROCEDURE — 36415 COLL VENOUS BLD VENIPUNCTURE: CPT | Mod: ZL | Performed by: FAMILY MEDICINE

## 2021-12-07 PROCEDURE — 86900 BLOOD TYPING SEROLOGIC ABO: CPT | Mod: ZL | Performed by: FAMILY MEDICINE

## 2021-12-07 PROCEDURE — 99214 OFFICE O/P EST MOD 30 MIN: CPT | Performed by: FAMILY MEDICINE

## 2021-12-07 PROCEDURE — G0463 HOSPITAL OUTPT CLINIC VISIT: HCPCS

## 2021-12-07 RX ORDER — ONDANSETRON 4 MG/1
4 TABLET, FILM COATED ORAL EVERY 6 HOURS PRN
Qty: 10 TABLET | Refills: 0 | Status: SHIPPED | OUTPATIENT
Start: 2021-12-07 | End: 2022-01-04

## 2021-12-07 RX ORDER — NAPROXEN 500 MG/1
500 TABLET ORAL 2 TIMES DAILY WITH MEALS
Qty: 20 TABLET | Refills: 0 | Status: SHIPPED | OUTPATIENT
Start: 2021-12-07 | End: 2021-12-17

## 2021-12-07 ASSESSMENT — PAIN SCALES - GENERAL: PAINLEVEL: EXTREME PAIN (8)

## 2021-12-07 NOTE — PATIENT INSTRUCTIONS
To help control bleeding: prescription for oral contraceptive pills is sent; follow directions to help control bleeding.

## 2021-12-07 NOTE — PROGRESS NOTES
ASSESSMENT/PLAN:     1. Episode of heavy vaginal bleeding        Assessment & Plan   1.  Patient reassured by negative pregnancy test.  2.  Discussed dysfunctional bleeding, heavy bleeding.  Most likely, when patient had used Mirena IUD this was helping to control the symptoms.  At present time time, to help control bleeding, she will be started on oral contraceptives starting at 3 tablets a day for 3 days, then 2 tablets a day for 3 days, then finishing out pill pack.  Along with this, recommended naproxen 500 mg twice daily as this will likely also help decrease uterine blood loss.  2.  She will be scheduled for pelvic ultrasound.  It would have anticipated a positive hCG if she had any retained products of conception at this point.  3.  Chlamydia and gonorrhea testing also completed today given new partner/unprotected intercourse this summer.  4.  She will return in roughly 3 weeks time for follow-up and Mirena IUD placement.  Problem List Items Addressed This Visit     None      Visit Diagnoses     Episode of heavy vaginal bleeding    -  Primary    Relevant Medications    naproxen (NAPROSYN) 500 MG tablet    ondansetron (ZOFRAN) 4 MG tablet    norgestrel-ethinyl estradiol (LO/OVRAL) 0.3-30 MG-MCG tablet    Other Relevant Orders    CBC W PLT No Diff (Completed)    ABO and Rh    HCG quantitative pregnancy    GC/Chlamydia by PCR    Pregnancy, Urine (HCG) (Completed)    US Pelvic Complete with Transvaginal          Review of the result(s) of each unique test - I have personally reviewed the labs listed below.       Diagnosis or treatment significantly limited by social determinants of health - yes     38 minutes spent on the date of the encounter doing chart review, history and exam, documentation and further activities per the note      PDMP Review     None                         RICO PIKE MD, FAAFP  [facility]      NURSING NOTES:  Nursing Notes:   Alise Baker MA   "2021  3:26 PM  Sign at exiting of workspace  Chief Complaint   Patient presents with     Abnormal Uterine Bleeding     heavy bleeding; blood clots     Patient is here for abnormal heavy bleeding and blood clots. She was pregnant but had an  via pills back in August.     Initial /74 (BP Location: Right arm, Patient Position: Sitting, Cuff Size: Adult Large)   Pulse 94   Temp 98.3  F (36.8  C) (Tympanic)   Resp 16   Wt 106.1 kg (233 lb 12.8 oz)   SpO2 100%   Breastfeeding No   BMI 35.55 kg/m   Estimated body mass index is 35.55 kg/m  as calculated from the following:    Height as of 21: 1.727 m (5' 8\").    Weight as of this encounter: 106.1 kg (233 lb 12.8 oz).  Medication Reconciliation: complete    Alise Baker MA       SUBJECTIVE:    Alexy Carrion is a 31 year old female  who presents for the following health issues:  Heavy vaginal bleeding.    HPI  Alexy Carrion is a 31 year old female   presents for evaluation of heavy vaginal bleeding.  She had a medication induced  in August.    Patient with positive UPT  in the ED.  Twin gestation diagnosed  - two intrauterine gestational sacs.      She went to [facility] in [place] on .  Two days later she passed the pregnancy.  She then had about 2 weeks of period-like bleeding.  She then stopped bleeding for a week, then started to have light bleeding for a week, then stopped for 3 weeks.  About 2 weeks ago she started to pass lemon sized clots.  She would pass one or two of these a day.    She hasn't taken any pregnancy tests since her IAB.    At work she was changing a pad/tampon every 4 hours.    Yesterday she had no bleeding.      She denies vaginal/pelvic trauma.  She hasn't been on birth control since her IAB.  Last had intercourse in July.  This was a 1 night stand/new partner.  Her long-term significant other is incarcerated.    Blood type is A positive   Previously used Mirena IUD for " birth control.      She has a history of postpartum hemorrhage after her first delivery.  She is not aware of other history of bleeding problems.     Allergies   Allergen Reactions     Aspirin Anaphylaxis and Hives     Other reaction(s): Angioedema  Swelling of throat and nausea  Other reaction(s): Wheezing     Doxycycline Anaphylaxis and GI Disturbance     Wasp Venom Protein Anaphylaxis     Clindamycin      Other reaction(s): Constipation  Nausea and Vomiting.     Current Outpatient Medications   Medication     EPINEPHrine (ANY BX GENERIC EQUIV) 0.3 MG/0.3ML injection 2-pack     metoclopramide (REGLAN) 5 MG tablet     mupirocin (BACTROBAN) 2 % external ointment     naproxen (NAPROSYN) 500 MG tablet     norgestrel-ethinyl estradiol (LO/OVRAL) 0.3-30 MG-MCG tablet     ondansetron (ZOFRAN) 4 MG tablet     No current facility-administered medications for this visit.      Past Medical History:   Diagnosis Date     Cholestasis during pregnancy      Personal history of other medical treatment (CODE)     No Comments Provided      Past Surgical History:   Procedure Laterality Date     OTHER SURGICAL HISTORY      [id number],OTHER,age 4       Review of Systems  Patient had COVID in beginning of November.      PHQ-2 Score:     PHQ-2 ( 1999 Pf[employer]) 12/7/2021 9/11/2021   Q1: Little interest or pleasure in doing things 0 0   Q2: Feeling down, depressed or hopeless 0 0   PHQ-2 Score 0 0   PHQ-2 Total Score (12-17 Years)- Positive if 3 or more points; Administer PHQ-A if positive - 0         PHQ-9 SCORE 10/24/2016 2/22/2018   PHQ-9 Total Score 0 0     No flowsheet data found.        OBJECTIVE:     Objective  /74 (BP Location: Right arm, Patient Position: Sitting, Cuff Size: Adult Large)   Pulse 94   Temp 98.3  F (36.8  C) (Tympanic)   Resp 16   Wt 106.1 kg (233 lb 12.8 oz)   SpO2 100%   Breastfeeding No   BMI 35.55 kg/m   Body mass index is 35.55 kg/m .    Wt Readings from Last 4 Encounters:   12/07/21 106.1 kg (233 lb  12.8 oz)   09/11/21 101.3 kg (223 lb 5 oz)   08/13/21 101.6 kg (224 lb)   08/03/21 102 kg (224 lb 12.8 oz)       Nursing notes and VS reviewed  She is alert, hemodynamically stable    Physical Exam   GENERAL: healthy, alert and no distress  RESP: lungs clear to auscultation - no rales, rhonchi or wheezes  CV: regular rate and rhythm, no murmur  MS: no gross musculoskeletal defects noted, no edema    Patient has photos of her clots     Lab Results   Component Value Date    [id number] A Rh Positive 01/09/2018         Results for orders placed or performed in visit on 12/07/21   Pregnancy, Urine (HCG)     Status: Normal   Result Value Ref Range    hCG Urine Qualitative Negative Negative   CBC W PLT No Diff     Status: Abnormal   Result Value Ref Range    WBC Count 9.7 4.0 - 11.0 10e3/uL    RBC Count 3.86 3.80 - 5.20 10e6/uL    Hemoglobin 11.1 (L) 11.7 - 15.7 g/dL    Hematocrit 33.4 (L) 35.0 - 47.0 %    MCV 87 78 - 100 fL    MCH 28.8 26.5 - 33.0 pg    MCHC 33.2 31.5 - 36.5 g/dL    RDW 12.7 10.0 - 15.0 %    Platelet Count 207 150 - 450 10e3/uL

## 2021-12-07 NOTE — TELEPHONE ENCOUNTER
Left message to have patient call back. Needing to verify if patient is currently pregnant as she had a pregnacy test done in August. Gabby Uriarte RN  ....................  12/7/2021   8:41 AM

## 2021-12-07 NOTE — TELEPHONE ENCOUNTER
No return phone call at this time. Have not had the chance to triage patient. Gabby Uriarte RN  ....................  12/7/2021   2:27 PM

## 2021-12-07 NOTE — NURSING NOTE
"Chief Complaint   Patient presents with     Abnormal Uterine Bleeding     heavy bleeding; blood clots     Patient is here for abnormal heavy bleeding and blood clots. She was pregnant but had an  via pills back in August.     Initial /74 (BP Location: Right arm, Patient Position: Sitting, Cuff Size: Adult Large)   Pulse 94   Temp 98.3  F (36.8  C) (Tympanic)   Resp 16   Wt 106.1 kg (233 lb 12.8 oz)   SpO2 100%   Breastfeeding No   BMI 35.55 kg/m   Estimated body mass index is 35.55 kg/m  as calculated from the following:    Height as of 21: 1.727 m (5' 8\").    Weight as of this encounter: 106.1 kg (233 lb 12.8 oz).  Medication Reconciliation: complete    Alise Baker MA  "

## 2021-12-07 NOTE — TELEPHONE ENCOUNTER
Patient on Dr. Bradford Duncan's schedule for heavy bleeding. Please triage patient.    Alise Baker CMA on 12/7/2021 at 8:09 AM

## 2021-12-09 NOTE — TELEPHONE ENCOUNTER
Patient was seen in clinic 12/7 at 3:20 PM. Writer will close encounter at this time. Zo Mederos RN .............. 12/9/2021  4:31 PM

## 2021-12-15 ENCOUNTER — HOSPITAL ENCOUNTER (OUTPATIENT)
Dept: ULTRASOUND IMAGING | Facility: OTHER | Age: 31
Discharge: HOME | End: 2021-12-15
Attending: FAMILY MEDICINE | Admitting: FAMILY MEDICINE
Payer: COMMERCIAL

## 2021-12-15 DIAGNOSIS — N93.9 EPISODE OF HEAVY VAGINAL BLEEDING: ICD-10-CM

## 2021-12-15 PROCEDURE — 76830 TRANSVAGINAL US NON-OB: CPT

## 2021-12-16 DIAGNOSIS — N93.9 EPISODE OF HEAVY VAGINAL BLEEDING: Primary | ICD-10-CM

## 2021-12-16 DIAGNOSIS — R87.9 ABNORMAL TISSUE IN THE UTERUS: ICD-10-CM

## 2021-12-28 ENCOUNTER — TELEPHONE (OUTPATIENT)
Dept: FAMILY MEDICINE | Facility: OTHER | Age: 31
End: 2021-12-28
Payer: COMMERCIAL

## 2021-12-28 NOTE — TELEPHONE ENCOUNTER
Left message to call back.     She is scheduled with Dr. Bradford Duncan 12/29/2021 for IUD insertion. However, she has a consultation with Dr. Leung on 1/4/2022. Is the appointment with Dr. Bradford Duncan needed? (Dr. Leung can insert IUD).     Alise Baker CMA on 12/28/2021 at 4:46 PM

## 2021-12-29 NOTE — TELEPHONE ENCOUNTER
Patient contacted. Appointment with Dr. Bradford Duncan not needed today as she has a consult with Dr. Leung on 1/4/2022. Appointment cancelled.     Alise Baker CMA on 12/29/2021 at 11:48 AM

## 2022-01-04 ENCOUNTER — OFFICE VISIT (OUTPATIENT)
Dept: OBGYN | Facility: OTHER | Age: 32
End: 2022-01-04
Attending: FAMILY MEDICINE
Payer: COMMERCIAL

## 2022-01-04 VITALS
HEART RATE: 102 BPM | DIASTOLIC BLOOD PRESSURE: 78 MMHG | SYSTOLIC BLOOD PRESSURE: 124 MMHG | WEIGHT: 229.9 LBS | BODY MASS INDEX: 34.96 KG/M2

## 2022-01-04 DIAGNOSIS — N93.9 EPISODE OF HEAVY VAGINAL BLEEDING: ICD-10-CM

## 2022-01-04 DIAGNOSIS — R87.9 ABNORMAL TISSUE IN THE UTERUS: ICD-10-CM

## 2022-01-04 DIAGNOSIS — Z01.812 PRE-PROCEDURE LAB EXAM: Primary | ICD-10-CM

## 2022-01-04 PROCEDURE — G0463 HOSPITAL OUTPT CLINIC VISIT: HCPCS | Performed by: OBSTETRICS & GYNECOLOGY

## 2022-01-04 PROCEDURE — 99204 OFFICE O/P NEW MOD 45 MIN: CPT | Performed by: OBSTETRICS & GYNECOLOGY

## 2022-01-04 NOTE — PROGRESS NOTES
CC: Heavy bleeding after / birth control discussion    HPI:Alexy is a 31 year old female who presents for evaluation of abnormal vaginal bleeding referred by Dr. Bradford Duncan.     Alexy had an  at 8w4d with oral medication in September. Her bleeding stopped after two weeks, but then she started to bleed heavily again passing multiple clots. She had an ultrasound 12/15/21 and was started on oral contraceptive to help control the bleeding. Her bleeding stopped 2022. She does not complain of any pain and is requesting to have an IUD placed.         No LMP recorded.  Menstrual history: Regular 28 day periods- had mirena since age 17   Pelvic pain:None today  Dyspareunia:No  Pap Smears:Normal- last 2021  Hormones: On norgestrel-ethinyl estradiol after - has been off for a month   Smoking status:No  STI's:None recent  FHx: No family history of gynecological cancer  Ultrasound:12/15/2021  Exam: US PELVIC TRANSABDOMINAL AND TRANSVAGINAL.     Exam reason: Episode of heavy vaginal bleeding     Comparison: None.     Technique: Gray scale transabdominal and endovaginal exam was  performed.      FINDINGS:     Uterus/Endometrium:   Uterus measures 10.8 x 7.2 x 6.4 cm. There is a  mass within the endometrial canal measuring approximately 3.9 x 2.8 cm  in the transverse plane. This obliterates the endometrial myometrial  interface in the left uterine fundus. The endometrium is more  preserved and the right uterine fundus. The mass is highly vascular.      Right ovary: Right ovary is 2.7 x 1.7 x 1.5 cm. No adnexal masses.       Left ovary: Left ovary is 2.8 x 2.2 x 2.8 cm. There is a cyst with  layering debris in the left ovary measuring up to 2.1 cm, likely a  hemorrhagic cyst for which a dedicated follow-up is necessary. No  adnexal masses.     Pelvic fluid: None.                                                                      IMPRESSION:      There is a highly vascular mass within the  endometrial canal, which  appears to obliterate the endometrial myometrial interface in the  uterine fundus. This may represent an acquired uterine arteriovenous  malformation, retained products of conception, trophoblastic disease,  or other neoplasm. Of note, given the highly vascular appearance of  this mass, there is likely an increased risk of bleeding with tissue  sampling.     KISHOR SALAMANCA MD     OB History    Para Term  AB Living   5 4 4 0 1 4   SAB IAB Ectopic Multiple Live Births   0 1 0 1 4      # Outcome Date GA Lbr Jin/2nd Weight Sex Delivery Anes PTL Lv   5 IAB 21     IAB      4 Term 01/10/18 37w0d  3.13 kg (6 lb 14.4 oz) M Vag-Spont         Name: PERSONS,BB KARRA      Apgar1: 9  Apgar5: 10   3A Term 14 37w1d   M Vag-Spont   SHRUTI   3B Term 14 37w1d   F Vag-Breech   SHRUTI   2 Term 10/2009  01:00 3.289 kg (7 lb 4 oz) M Vag-Spont   SHRUTI      Name: Horton   1 Term 2008  03:00 4.082 kg (9 lb) F Vag-Spont   SHRUTI      Name: Ascencion     Past Medical History:   Diagnosis Date     Cholestasis during pregnancy      Personal history of other medical treatment (CODE)     No Comments Provided     Past Surgical History:   Procedure Laterality Date     OTHER SURGICAL HISTORY      [id number],OTHER,age 4       Current Outpatient Medications   Medication     EPINEPHrine (ANY BX GENERIC EQUIV) 0.3 MG/0.3ML injection 2-pack     metoclopramide (REGLAN) 5 MG tablet     mupirocin (BACTROBAN) 2 % external ointment     ondansetron (ZOFRAN) 4 MG tablet     norgestrel-ethinyl estradiol (LO/OVRAL) 0.3-30 MG-MCG tablet     No current facility-administered medications for this visit.     Allergies   Allergen Reactions     Aspirin Anaphylaxis and Hives     Other reaction(s): Angioedema  Swelling of throat and nausea  Other reaction(s): Wheezing     Doxycycline Anaphylaxis and GI Disturbance     Wasp Venom Protein Anaphylaxis     Clindamycin      Other reaction(s): Constipation  Nausea and Vomiting.      Vitals:    01/04/22 1434   BP: 124/78   Pulse: 102   Weight: 104.3 kg (229 lb 14.4 oz)     hCG Quantitative   Date Value Ref Range Status   12/07/2021 3 IU/L Final     Comment:     Adult:0-5 IU/L for healthy non-pregnant person  Neonates:Should be within normal ranges by 2 days after birth  Expected value for healthy non-pregnant premenopausal woman is < 5 mlU/mL    For Gestational Assessment:  Approx Gestation AgeApprox HCG Range  1-2 lptyd79-401 mlu/mL  2-3 rcgxk057-3086 mlu/mL  3-4 jphuh418-72614 mlu/mL  4-5 lwrjd3566-02589 mlu/mL  5-6 vkygy76013-859457 mlu/mL  6-8 xwpxl65662-366298 mlu/mL  8-12 pcizy34097-648899wrq/mL       10 point ROS of systems including Constitutional, Eyes, Respiratory, Cardiovascular, Gastroenterology, Genitourinary, Integumentary, Muscularskeletal, Psychiatric were all negative except for pertinent positives noted in my HPI.    Objective     General: Alert, talkative, in no apparent distress  Heart: S1, S2, regular rate and rhythm, no murmurs   Lungs: Clear to auscultation to all lung fields     Lab: No results found for any visits on 01/04/22.      ASSESSMENT/PLAN :    -Recheck quantitative hcg     - Hysteroscopy with D&C- will schedule for tomorrow 1/5/22    - Will discuss contraceptive options at surgical follow up       1. Pre-procedure lab exam    2. Episode of heavy vaginal bleeding    3. Abnormal tissue in the uterus        Charmaine Del Toro on 1/4/2022 at 3:23 PM      Bobby Leung MD FACOG  2:34 PM 1/4/2022

## 2022-01-04 NOTE — NURSING NOTE
Pt presents to clinic today for consult on vaginal bleeding. Pt states she had an  in September in  and has been having bleeding every since.      Medication Reconciliation: complete  Lubna Powers LPN

## 2022-01-04 NOTE — H&P (VIEW-ONLY)
CC: Heavy bleeding after / birth control discussion    HPI:Alexy is a 31 year old female who presents for evaluation of abnormal vaginal bleeding referred by Dr. Bradford Duncan.     Alexy had an  at 8w4d with oral medication in September. Her bleeding stopped after two weeks, but then she started to bleed heavily again passing multiple clots. She had an ultrasound 12/15/21 and was started on oral contraceptive to help control the bleeding. Her bleeding stopped 2022. She does not complain of any pain and is requesting to have an IUD placed.         No LMP recorded.  Menstrual history: Regular 28 day periods- had mirena since age 17   Pelvic pain:None today  Dyspareunia:No  Pap Smears:Normal- last 2021  Hormones: On norgestrel-ethinyl estradiol after - has been off for a month   Smoking status:No  STI's:None recent  FHx: No family history of gynecological cancer  Ultrasound:12/15/2021  Exam: US PELVIC TRANSABDOMINAL AND TRANSVAGINAL.     Exam reason: Episode of heavy vaginal bleeding     Comparison: None.     Technique: Gray scale transabdominal and endovaginal exam was  performed.      FINDINGS:     Uterus/Endometrium:   Uterus measures 10.8 x 7.2 x 6.4 cm. There is a  mass within the endometrial canal measuring approximately 3.9 x 2.8 cm  in the transverse plane. This obliterates the endometrial myometrial  interface in the left uterine fundus. The endometrium is more  preserved and the right uterine fundus. The mass is highly vascular.      Right ovary: Right ovary is 2.7 x 1.7 x 1.5 cm. No adnexal masses.       Left ovary: Left ovary is 2.8 x 2.2 x 2.8 cm. There is a cyst with  layering debris in the left ovary measuring up to 2.1 cm, likely a  hemorrhagic cyst for which a dedicated follow-up is necessary. No  adnexal masses.     Pelvic fluid: None.                                                                      IMPRESSION:      There is a highly vascular mass within the  endometrial canal, which  appears to obliterate the endometrial myometrial interface in the  uterine fundus. This may represent an acquired uterine arteriovenous  malformation, retained products of conception, trophoblastic disease,  or other neoplasm. Of note, given the highly vascular appearance of  this mass, there is likely an increased risk of bleeding with tissue  sampling.     KISHOR SALAMANCA MD     OB History    Para Term  AB Living   5 4 4 0 1 4   SAB IAB Ectopic Multiple Live Births   0 1 0 1 4      # Outcome Date GA Lbr Jin/2nd Weight Sex Delivery Anes PTL Lv   5 IAB 21     IAB      4 Term 01/10/18 37w0d  3.13 kg (6 lb 14.4 oz) M Vag-Spont         Name: PERSONS,BB KARRA      Apgar1: 9  Apgar5: 10   3A Term 14 37w1d   M Vag-Spont   SHRUTI   3B Term 14 37w1d   F Vag-Breech   SHRUTI   2 Term 10/2009  01:00 3.289 kg (7 lb 4 oz) M Vag-Spont   SHRUTI      Name: Horton   1 Term 2008  03:00 4.082 kg (9 lb) F Vag-Spont   SHRUTI      Name: Ascencion     Past Medical History:   Diagnosis Date     Cholestasis during pregnancy      Personal history of other medical treatment (CODE)     No Comments Provided     Past Surgical History:   Procedure Laterality Date     OTHER SURGICAL HISTORY      [id number],OTHER,age 4       Current Outpatient Medications   Medication     EPINEPHrine (ANY BX GENERIC EQUIV) 0.3 MG/0.3ML injection 2-pack     metoclopramide (REGLAN) 5 MG tablet     mupirocin (BACTROBAN) 2 % external ointment     ondansetron (ZOFRAN) 4 MG tablet     norgestrel-ethinyl estradiol (LO/OVRAL) 0.3-30 MG-MCG tablet     No current facility-administered medications for this visit.     Allergies   Allergen Reactions     Aspirin Anaphylaxis and Hives     Other reaction(s): Angioedema  Swelling of throat and nausea  Other reaction(s): Wheezing     Doxycycline Anaphylaxis and GI Disturbance     Wasp Venom Protein Anaphylaxis     Clindamycin      Other reaction(s): Constipation  Nausea and Vomiting.      Vitals:    01/04/22 1434   BP: 124/78   Pulse: 102   Weight: 104.3 kg (229 lb 14.4 oz)     hCG Quantitative   Date Value Ref Range Status   12/07/2021 3 IU/L Final     Comment:     Adult:0-5 IU/L for healthy non-pregnant person  Neonates:Should be within normal ranges by 2 days after birth  Expected value for healthy non-pregnant premenopausal woman is < 5 mlU/mL    For Gestational Assessment:  Approx Gestation AgeApprox HCG Range  1-2 dfoli90-571 mlu/mL  2-3 wjvig124-1431 mlu/mL  3-4 jzgbh869-73208 mlu/mL  4-5 uomje5842-51930 mlu/mL  5-6 yggey46601-780996 mlu/mL  6-8 ypnmg84213-859002 mlu/mL  8-12 yqbnn48763-715664wbd/mL       10 point ROS of systems including Constitutional, Eyes, Respiratory, Cardiovascular, Gastroenterology, Genitourinary, Integumentary, Muscularskeletal, Psychiatric were all negative except for pertinent positives noted in my HPI.    Objective     General: Alert, talkative, in no apparent distress  Heart: S1, S2, regular rate and rhythm, no murmurs   Lungs: Clear to auscultation to all lung fields     Lab: No results found for any visits on 01/04/22.      ASSESSMENT/PLAN :    -Recheck quantitative hcg     - Hysteroscopy with D&C- will schedule for tomorrow 1/5/22    - Will discuss contraceptive options at surgical follow up       1. Pre-procedure lab exam    2. Episode of heavy vaginal bleeding    3. Abnormal tissue in the uterus        Charmaine Del Toro on 1/4/2022 at 3:23 PM      Bobby Leung MD FACOG  2:34 PM 1/4/2022

## 2022-01-05 ENCOUNTER — ANESTHESIA (OUTPATIENT)
Dept: SURGERY | Facility: OTHER | Age: 32
End: 2022-01-05
Payer: COMMERCIAL

## 2022-01-05 ENCOUNTER — ANESTHESIA EVENT (OUTPATIENT)
Dept: SURGERY | Facility: OTHER | Age: 32
End: 2022-01-05
Payer: COMMERCIAL

## 2022-01-05 ENCOUNTER — HOSPITAL ENCOUNTER (OUTPATIENT)
Facility: OTHER | Age: 32
Discharge: HOME | End: 2022-01-05
Attending: OBSTETRICS & GYNECOLOGY | Admitting: OBSTETRICS & GYNECOLOGY
Payer: COMMERCIAL

## 2022-01-05 VITALS
WEIGHT: 229 LBS | DIASTOLIC BLOOD PRESSURE: 66 MMHG | SYSTOLIC BLOOD PRESSURE: 109 MMHG | HEART RATE: 94 BPM | BODY MASS INDEX: 34.71 KG/M2 | OXYGEN SATURATION: 98 % | HEIGHT: 68 IN | RESPIRATION RATE: 15 BRPM | TEMPERATURE: 98.6 F

## 2022-01-05 DIAGNOSIS — R87.9 ABNORMAL TISSUE IN THE UTERUS: ICD-10-CM

## 2022-01-05 LAB — HCG UR QL: NEGATIVE

## 2022-01-05 PROCEDURE — 88305 TISSUE EXAM BY PATHOLOGIST: CPT

## 2022-01-05 PROCEDURE — 710N000012 HC RECOVERY PHASE 2, PER MINUTE: Performed by: OBSTETRICS & GYNECOLOGY

## 2022-01-05 PROCEDURE — 58563 HYSTEROSCOPY ABLATION: CPT | Performed by: NURSE ANESTHETIST, CERTIFIED REGISTERED

## 2022-01-05 PROCEDURE — 360N000076 HC SURGERY LEVEL 3, PER MIN: Performed by: OBSTETRICS & GYNECOLOGY

## 2022-01-05 PROCEDURE — 258N000003 HC RX IP 258 OP 636: Performed by: NURSE ANESTHETIST, CERTIFIED REGISTERED

## 2022-01-05 PROCEDURE — 250N000011 HC RX IP 250 OP 636: Performed by: NURSE ANESTHETIST, CERTIFIED REGISTERED

## 2022-01-05 PROCEDURE — 250N000011 HC RX IP 250 OP 636: Performed by: OBSTETRICS & GYNECOLOGY

## 2022-01-05 PROCEDURE — 81025 URINE PREGNANCY TEST: CPT | Performed by: OBSTETRICS & GYNECOLOGY

## 2022-01-05 PROCEDURE — 999N000141 HC STATISTIC PRE-PROCEDURE NURSING ASSESSMENT: Performed by: OBSTETRICS & GYNECOLOGY

## 2022-01-05 PROCEDURE — 370N000017 HC ANESTHESIA TECHNICAL FEE, PER MIN: Performed by: OBSTETRICS & GYNECOLOGY

## 2022-01-05 PROCEDURE — 272N000001 HC OR GENERAL SUPPLY STERILE: Performed by: OBSTETRICS & GYNECOLOGY

## 2022-01-05 PROCEDURE — 258N000001 HC RX 258: Performed by: OBSTETRICS & GYNECOLOGY

## 2022-01-05 PROCEDURE — 58563 HYSTEROSCOPY ABLATION: CPT | Performed by: OBSTETRICS & GYNECOLOGY

## 2022-01-05 PROCEDURE — 250N000009 HC RX 250: Performed by: NURSE ANESTHETIST, CERTIFIED REGISTERED

## 2022-01-05 RX ORDER — BUPIVACAINE HYDROCHLORIDE 2.5 MG/ML
INJECTION, SOLUTION INFILTRATION; PERINEURAL PRN
Status: DISCONTINUED | OUTPATIENT
Start: 2022-01-05 | End: 2022-01-05 | Stop reason: HOSPADM

## 2022-01-05 RX ORDER — LIDOCAINE HYDROCHLORIDE 20 MG/ML
INJECTION, SOLUTION INFILTRATION; PERINEURAL PRN
Status: DISCONTINUED | OUTPATIENT
Start: 2022-01-05 | End: 2022-01-05

## 2022-01-05 RX ORDER — DEXAMETHASONE SODIUM PHOSPHATE 4 MG/ML
INJECTION, SOLUTION INTRA-ARTICULAR; INTRALESIONAL; INTRAMUSCULAR; INTRAVENOUS; SOFT TISSUE PRN
Status: DISCONTINUED | OUTPATIENT
Start: 2022-01-05 | End: 2022-01-05

## 2022-01-05 RX ORDER — ACETAMINOPHEN 325 MG/1
975 TABLET ORAL ONCE
Status: DISCONTINUED | OUTPATIENT
Start: 2022-01-05 | End: 2022-01-05 | Stop reason: HOSPADM

## 2022-01-05 RX ORDER — OXYCODONE HYDROCHLORIDE 5 MG/1
5 TABLET ORAL
Status: DISCONTINUED | OUTPATIENT
Start: 2022-01-05 | End: 2022-01-05 | Stop reason: HOSPADM

## 2022-01-05 RX ORDER — FENTANYL CITRATE 50 UG/ML
INJECTION, SOLUTION INTRAMUSCULAR; INTRAVENOUS PRN
Status: DISCONTINUED | OUTPATIENT
Start: 2022-01-05 | End: 2022-01-05

## 2022-01-05 RX ORDER — ONDANSETRON 2 MG/ML
INJECTION INTRAMUSCULAR; INTRAVENOUS PRN
Status: DISCONTINUED | OUTPATIENT
Start: 2022-01-05 | End: 2022-01-05

## 2022-01-05 RX ORDER — PROPOFOL 10 MG/ML
INJECTION, EMULSION INTRAVENOUS PRN
Status: DISCONTINUED | OUTPATIENT
Start: 2022-01-05 | End: 2022-01-05

## 2022-01-05 RX ORDER — SODIUM CHLORIDE, SODIUM LACTATE, POTASSIUM CHLORIDE, CALCIUM CHLORIDE 600; 310; 30; 20 MG/100ML; MG/100ML; MG/100ML; MG/100ML
INJECTION, SOLUTION INTRAVENOUS CONTINUOUS
Status: DISCONTINUED | OUTPATIENT
Start: 2022-01-05 | End: 2022-01-05 | Stop reason: HOSPADM

## 2022-01-05 RX ORDER — LIDOCAINE 40 MG/G
CREAM TOPICAL
Status: DISCONTINUED | OUTPATIENT
Start: 2022-01-05 | End: 2022-01-05 | Stop reason: HOSPADM

## 2022-01-05 RX ADMIN — PROPOFOL 125 MCG/KG/MIN: 10 INJECTION, EMULSION INTRAVENOUS at 09:05

## 2022-01-05 RX ADMIN — MIDAZOLAM HYDROCHLORIDE 2 MG: 1 INJECTION, SOLUTION INTRAMUSCULAR; INTRAVENOUS at 09:04

## 2022-01-05 RX ADMIN — LIDOCAINE HYDROCHLORIDE 40 MG: 20 INJECTION, SOLUTION INFILTRATION; PERINEURAL at 09:04

## 2022-01-05 RX ADMIN — DEXAMETHASONE SODIUM PHOSPHATE 4 MG: 4 INJECTION, SOLUTION INTRAMUSCULAR; INTRAVENOUS at 09:05

## 2022-01-05 RX ADMIN — ONDANSETRON HYDROCHLORIDE 4 MG: 2 SOLUTION INTRAMUSCULAR; INTRAVENOUS at 09:05

## 2022-01-05 RX ADMIN — PROPOFOL 100 MG: 10 INJECTION, EMULSION INTRAVENOUS at 09:04

## 2022-01-05 RX ADMIN — SODIUM CHLORIDE, POTASSIUM CHLORIDE, SODIUM LACTATE AND CALCIUM CHLORIDE: 600; 310; 30; 20 INJECTION, SOLUTION INTRAVENOUS at 08:51

## 2022-01-05 RX ADMIN — SODIUM CHLORIDE, POTASSIUM CHLORIDE, SODIUM LACTATE AND CALCIUM CHLORIDE: 600; 310; 30; 20 INJECTION, SOLUTION INTRAVENOUS at 10:14

## 2022-01-05 RX ADMIN — FENTANYL CITRATE 100 MCG: 50 INJECTION, SOLUTION INTRAMUSCULAR; INTRAVENOUS at 09:04

## 2022-01-05 ASSESSMENT — MIFFLIN-ST. JEOR: SCORE: 1797.24

## 2022-01-05 NOTE — DISCHARGE INSTRUCTIONS
[facility]  Adult Discharge Orders & Instructions      For 24 hours after surgery:  1. Get plenty of rest.  A responsible adult must stay with you for at least 24 hours after you leave the hospital.   2. You may feel lightheaded.  IF so, sit for a few minutes before standing.  Have someone help you get up.   3. You may have a slight fever. Call the doctor if your fever is over 101 F (38.3 C) (taken under the tongue) or lasts longer than 24 hours.  4. You may have a dry mouth, a sore throat, muscle aches or trouble sleeping.  These should go away after 24 hours.  5. Do not make important or legal decisions.  6.   Do not drive or use heavy equipment.  If you have weakness or tingling, don't drive or use heavy equipment until this feeling goes away.                                                                                                                                                                         To contact a doctor, call    [phone]______________

## 2022-01-05 NOTE — OP NOTE
[facility] Gynecology Operative Note    Pre-operative diagnosis: Abnormal uterine bleeding  Endometrial mass on US   Post-operative diagnosis: Same   Procedure: Hysteroscopy, Myosure resection of endometrial mass   Surgeon: Bobby Leung MD   Assistant(s): None   Anesthesia: MAC (monitored anesthesia care)   Estimated blood loss: 100ml   Total IV fluids: (See anesthesia record)   Blood transfusion: No transfusion was given during surgery   Total urine output: (See anesthesia record)   Drains: None   Specimens: Endometrial curettings   Findings: Posterior uterine/endometrial mass   Complications: None   Condition: Stable   Procedure details: After consent was obtained thepatient was taken to the OR suite and placed under Monitored Anesthesia.  She was prepped and draped sterile and positioned in dorsal lithotomy position in candy cane stirrups.  After timeout was peformed a speculum wasplaced in the vagina and the cervix was visualized.  2 % lidocaine without epinephrine was used to infiltrate the cervix anteriorly and a paracervical block was injected at 3 and 9 o'clock.  The cervix was grasped witha tenaculum and a hysteroscope introduced The cavity was distended with saline, a posterior uterine fundal mass was noted and both tubal ostia were seen.  Sharp curettage was then performed with the myosure device  visually resecting the fundal mass in full. Bleeding was minimal at that point.  The tenaculum was released from the cervix and the tenaculum site treated with silver nitrate.  Pathology was submitted as endometrial curettings. Fluid deficit was 600 ml. The speculum was removed and the patient awakened form anesthesia and taken to PACU in stable condition. EBL was minimal. No complications.    Bobby Leung MD FACOG  9:43 AM 1/5/2022

## 2022-01-05 NOTE — ANESTHESIA CARE TRANSFER NOTE
Patient: Alexy Carrion    Procedure: Procedure(s):  Hysteroscopy with Dilation and Currettage, with myosure       Diagnosis: Abnormal tissue in the uterus [R87.9]  Diagnosis Additional Information: No value filed.    Anesthesia Type:   MAC     Note:    Oropharynx: oropharynx clear of all foreign objects and spontaneously breathing  Level of Consciousness: drowsy  Oxygen Supplementation: face mask  Level of Supplemental Oxygen (L/min / FiO2): 8  Independent Airway: airway patency satisfactory and stable  Dentition: dentition unchanged  Vital Signs Stable: post-procedure vital signs reviewed and stable  Report to RN Given: handoff report given  Patient transferred to: Phase II    Handoff Report: Identifed the Patient, Identified the Reponsible Provider, Reviewed the pertinent medical history, Discussed the surgical course, Reviewed Intra-OP anesthesia mangement and issues during anesthesia, Set expectations for post-procedure period and Allowed opportunity for questions and acknowledgement of understanding      Vitals:  Vitals Value Taken Time   BP 96/56 01/05/22 0946   Temp     Pulse 63 01/05/22 0946   Resp     SpO2 99 % 01/05/22 0952   Vitals shown include unvalidated device data.    Electronically Signed By: BRYANT Brunner CRNA  January 5, 2022  9:53 AM

## 2022-01-05 NOTE — ANESTHESIA PREPROCEDURE EVALUATION
Anesthesia Pre-Procedure Evaluation    Patient: Alexy Carrion   MRN: [id number] : 1990        Preoperative Diagnosis: Abnormal tissue in the uterus [R87.9]    Procedure : Procedure(s):  Hysteroscopy with Dilation and Currettage, possible myosure          Past Medical History:   Diagnosis Date     Cholestasis during pregnancy      Personal history of other medical treatment (CODE)     No Comments Provided      Past Surgical History:   Procedure Laterality Date     OTHER SURGICAL HISTORY      [id number],OTHER,age 4      Allergies   Allergen Reactions     Aspirin Anaphylaxis and Hives     Other reaction(s): Angioedema  Swelling of throat and nausea  Other reaction(s): Wheezing     Doxycycline Anaphylaxis and GI Disturbance     Wasp Venom Protein Anaphylaxis     Clindamycin      Other reaction(s): Constipation  Nausea and Vomiting.      Social History     Tobacco Use     Smoking status: Never Smoker     Smokeless tobacco: Never Used   Substance Use Topics     Alcohol use: Yes     Alcohol/week: 0.0 standard drinks     Comment: Alcoholic Drinks/day: rarely      Wt Readings from Last 1 Encounters:   22 104.3 kg (229 lb 14.4 oz)        Anesthesia Evaluation   Pt has had prior anesthetic.     No history of anesthetic complications       ROS/MED HX  ENT/Pulmonary:  - neg pulmonary ROS     Neurologic:  - neg neurologic ROS     Cardiovascular:  - neg cardiovascular ROS     METS/Exercise Tolerance: >4 METS    Hematologic:  - neg hematologic  ROS     Musculoskeletal:  - neg musculoskeletal ROS     GI/Hepatic:  - neg GI/hepatic ROS     Renal/Genitourinary:  - neg Renal ROS     Endo:     (+) Obesity,     Psychiatric/Substance Use:     (+) psychiatric history bipolar     Infectious Disease:  - neg infectious disease ROS     Malignancy:  - neg malignancy ROS     Other:  - neg other ROS          Physical Exam    Airway        Mallampati: I   TM distance: > 3 FB   Neck ROM: full   Mouth opening: > 3 cm    Respiratory  Devices and Support         Dental  no notable dental history         Cardiovascular          Rhythm and rate: regular and normal     Pulmonary   pulmonary exam normal        breath sounds clear to auscultation           OUTSIDE LABS:  CBC:   Lab Results   Component Value Date    WBC 9.7 12/07/2021    WBC 10.9 08/13/2021    HGB 11.1 (L) 12/07/2021    HGB 11.8 08/13/2021    HCT 33.4 (L) 12/07/2021    HCT 35.7 08/13/2021     12/07/2021     08/13/2021     BMP:   Lab Results   Component Value Date     06/19/2017     09/30/2013    POTASSIUM 3.5 06/19/2017    POTASSIUM 3.8 09/30/2013    CHLORIDE 108 (H) 06/19/2017    CHLORIDE 103 09/30/2013    CO2 22 06/19/2017    CO2 23 09/30/2013    BUN 9 06/19/2017    BUN 11 09/30/2013    CR 0.54 (L) 06/19/2017    CR 0.55 (L) 07/21/2014    GLC 75 06/19/2017    GLC 94 06/25/2014     COAGS: No results found for: PTT, INR, FIBR  POC:   Lab Results   Component Value Date    HCG Negative 12/07/2021     HEPATIC:   Lab Results   Component Value Date    ALBUMIN 3.1 (L) 01/09/2018    PROTTOTAL 5.8 (L) 01/09/2018    ALKPHOS 184 (H) 01/09/2018    BILITOTAL 0.5 01/09/2018    BILIDIRECT 0.08 01/09/2018     OTHER:   Lab Results   Component Value Date    OREN 8.9 06/19/2017    CRP 3.3 08/13/2021       Anesthesia Plan    ASA Status:  2   NPO Status:  NPO Appropriate    Anesthesia Type: MAC.     - Reason for MAC: straight local not clinically adequate   Induction: Intravenous.           Consents    Anesthesia Plan(s) and associated risks, benefits, and realistic alternatives discussed. Questions answered and patient/representative(s) expressed understanding.    - Discussed:     - Discussed with:  Patient         Postoperative Care       PONV prophylaxis: Ondansetron (or other 5HT-3)     Comments:                BRYANT SOLORZANO CRNA

## 2022-01-05 NOTE — ANESTHESIA POSTPROCEDURE EVALUATION
Patient: Alexy Carrion    Procedure: Procedure(s):  Hysteroscopy with Dilation and Currettage, with myosure       Diagnosis:Abnormal tissue in the uterus [R87.9]  Diagnosis Additional Information: No value filed.    Anesthesia Type:  MAC    Note:  Disposition: Outpatient   Postop Pain Control: Uneventful            Sign Out: Well controlled pain   PONV: No   Neuro/Psych: Uneventful            Sign Out: Acceptable/Baseline neuro status   Airway/Respiratory: Uneventful            Sign Out: Acceptable/Baseline resp. status   CV/Hemodynamics: Uneventful            Sign Out: Acceptable CV status; No obvious hypovolemia; No obvious fluid overload   Other NRE: NONE   DID A NON-ROUTINE EVENT OCCUR? No           Last vitals:  Vitals Value Taken Time   BP 98/57 01/05/22 1015   Temp 97.1  F (36.2  C) 01/05/22 0946   Pulse 63 01/05/22 1015   Resp 16 01/05/22 1015   SpO2 97 % 01/05/22 1029   Vitals shown include unvalidated device data.    Electronically Signed By: BRYANT Brunner CRNA  January 5, 2022  10:30 AM

## 2022-01-06 LAB
PATH REPORT.COMMENTS IMP SPEC: NORMAL
PATH REPORT.FINAL DX SPEC: NORMAL
PHOTO IMAGE: NORMAL

## 2022-04-29 ENCOUNTER — HOSPITAL ENCOUNTER (EMERGENCY)
Facility: OTHER | Age: 32
Discharge: HOME | End: 2022-04-29
Attending: STUDENT IN AN ORGANIZED HEALTH CARE EDUCATION/TRAINING PROGRAM | Admitting: STUDENT IN AN ORGANIZED HEALTH CARE EDUCATION/TRAINING PROGRAM
Payer: COMMERCIAL

## 2022-04-29 VITALS
HEART RATE: 82 BPM | WEIGHT: 229 LBS | RESPIRATION RATE: 20 BRPM | OXYGEN SATURATION: 100 % | TEMPERATURE: 97.7 F | SYSTOLIC BLOOD PRESSURE: 119 MMHG | BODY MASS INDEX: 34.71 KG/M2 | DIASTOLIC BLOOD PRESSURE: 73 MMHG | HEIGHT: 68 IN

## 2022-04-29 DIAGNOSIS — R11.0 NAUSEA: ICD-10-CM

## 2022-04-29 DIAGNOSIS — R10.13 EPIGASTRIC PAIN: ICD-10-CM

## 2022-04-29 LAB
ALBUMIN SERPL-MCNC: 4.3 G/DL (ref 3.5–5.7)
ALP SERPL-CCNC: 48 U/L (ref 34–104)
ALT SERPL W P-5'-P-CCNC: 10 U/L (ref 7–52)
ANION GAP SERPL CALCULATED.3IONS-SCNC: 6 MMOL/L (ref 3–14)
AST SERPL W P-5'-P-CCNC: 13 U/L (ref 13–39)
BASOPHILS # BLD AUTO: 0 10E3/UL (ref 0–0.2)
BASOPHILS NFR BLD AUTO: 0 %
BILIRUB SERPL-MCNC: 0.3 MG/DL (ref 0.3–1)
BUN SERPL-MCNC: 15 MG/DL (ref 7–25)
CALCIUM SERPL-MCNC: 9.4 MG/DL (ref 8.6–10.3)
CHLORIDE BLD-SCNC: 106 MMOL/L (ref 98–107)
CO2 SERPL-SCNC: 25 MMOL/L (ref 21–31)
CREAT SERPL-MCNC: 0.7 MG/DL (ref 0.6–1.2)
EOSINOPHIL # BLD AUTO: 0.1 10E3/UL (ref 0–0.7)
EOSINOPHIL NFR BLD AUTO: 1 %
ERYTHROCYTE [DISTWIDTH] IN BLOOD BY AUTOMATED COUNT: 13 % (ref 10–15)
GFR SERPL CREATININE-BSD FRML MDRD: >90 ML/MIN/1.73M2
GLUCOSE BLD-MCNC: 92 MG/DL (ref 70–105)
HCT VFR BLD AUTO: 38.3 % (ref 35–47)
HGB BLD-MCNC: 12.9 G/DL (ref 11.7–15.7)
IMM GRANULOCYTES # BLD: 0 10E3/UL
IMM GRANULOCYTES NFR BLD: 0 %
LIPASE SERPL-CCNC: 30 U/L (ref 11–82)
LYMPHOCYTES # BLD AUTO: 1.8 10E3/UL (ref 0.8–5.3)
LYMPHOCYTES NFR BLD AUTO: 17 %
MCH RBC QN AUTO: 28.5 PG (ref 26.5–33)
MCHC RBC AUTO-ENTMCNC: 33.7 G/DL (ref 31.5–36.5)
MCV RBC AUTO: 85 FL (ref 78–100)
MONOCYTES # BLD AUTO: 0.6 10E3/UL (ref 0–1.3)
MONOCYTES NFR BLD AUTO: 6 %
NEUTROPHILS # BLD AUTO: 8.1 10E3/UL (ref 1.6–8.3)
NEUTROPHILS NFR BLD AUTO: 76 %
NRBC # BLD AUTO: 0 10E3/UL
NRBC BLD AUTO-RTO: 0 /100
PLATELET # BLD AUTO: 202 10E3/UL (ref 150–450)
POTASSIUM BLD-SCNC: 3.8 MMOL/L (ref 3.5–5.1)
PROT SERPL-MCNC: 6.9 G/DL (ref 6.4–8.9)
RBC # BLD AUTO: 4.52 10E6/UL (ref 3.8–5.2)
SODIUM SERPL-SCNC: 137 MMOL/L (ref 134–144)
WBC # BLD AUTO: 10.7 10E3/UL (ref 4–11)

## 2022-04-29 PROCEDURE — 85014 HEMATOCRIT: CPT | Performed by: STUDENT IN AN ORGANIZED HEALTH CARE EDUCATION/TRAINING PROGRAM

## 2022-04-29 PROCEDURE — 83690 ASSAY OF LIPASE: CPT | Performed by: STUDENT IN AN ORGANIZED HEALTH CARE EDUCATION/TRAINING PROGRAM

## 2022-04-29 PROCEDURE — 250N000013 HC RX MED GY IP 250 OP 250 PS 637: Performed by: STUDENT IN AN ORGANIZED HEALTH CARE EDUCATION/TRAINING PROGRAM

## 2022-04-29 PROCEDURE — 250N000011 HC RX IP 250 OP 636: Performed by: STUDENT IN AN ORGANIZED HEALTH CARE EDUCATION/TRAINING PROGRAM

## 2022-04-29 PROCEDURE — 80053 COMPREHEN METABOLIC PANEL: CPT | Performed by: STUDENT IN AN ORGANIZED HEALTH CARE EDUCATION/TRAINING PROGRAM

## 2022-04-29 PROCEDURE — 99283 EMERGENCY DEPT VISIT LOW MDM: CPT | Performed by: STUDENT IN AN ORGANIZED HEALTH CARE EDUCATION/TRAINING PROGRAM

## 2022-04-29 PROCEDURE — 250N000009 HC RX 250: Performed by: STUDENT IN AN ORGANIZED HEALTH CARE EDUCATION/TRAINING PROGRAM

## 2022-04-29 PROCEDURE — 36415 COLL VENOUS BLD VENIPUNCTURE: CPT | Performed by: STUDENT IN AN ORGANIZED HEALTH CARE EDUCATION/TRAINING PROGRAM

## 2022-04-29 RX ORDER — MAGNESIUM HYDROXIDE/ALUMINUM HYDROXICE/SIMETHICONE 120; 1200; 1200 MG/30ML; MG/30ML; MG/30ML
15 SUSPENSION ORAL ONCE
Status: COMPLETED | OUTPATIENT
Start: 2022-04-29 | End: 2022-04-29

## 2022-04-29 RX ORDER — ONDANSETRON 4 MG/1
4 TABLET, ORALLY DISINTEGRATING ORAL ONCE
Status: COMPLETED | OUTPATIENT
Start: 2022-04-29 | End: 2022-04-29

## 2022-04-29 RX ORDER — DIPHENHYDRAMINE HCL 25 MG
25 CAPSULE ORAL ONCE
Status: COMPLETED | OUTPATIENT
Start: 2022-04-29 | End: 2022-04-29

## 2022-04-29 RX ORDER — LIDOCAINE HYDROCHLORIDE 20 MG/ML
15 SOLUTION OROPHARYNGEAL ONCE
Status: COMPLETED | OUTPATIENT
Start: 2022-04-29 | End: 2022-04-29

## 2022-04-29 RX ORDER — ACETAMINOPHEN 500 MG
1000 TABLET ORAL EVERY 6 HOURS PRN
COMMUNITY
End: 2024-10-01

## 2022-04-29 RX ORDER — ALUMINA, MAGNESIA, AND SIMETHICONE 2400; 2400; 240 MG/30ML; MG/30ML; MG/30ML
30 SUSPENSION ORAL EVERY 4 HOURS PRN
Qty: 355 ML | Refills: 0 | Status: SHIPPED | OUTPATIENT
Start: 2022-04-29 | End: 2024-10-01

## 2022-04-29 RX ORDER — ONDANSETRON 4 MG/1
4 TABLET, ORALLY DISINTEGRATING ORAL EVERY 6 HOURS PRN
Qty: 12 TABLET | Refills: 0 | Status: SHIPPED | OUTPATIENT
Start: 2022-04-29 | End: 2022-05-02

## 2022-04-29 RX ORDER — METOCLOPRAMIDE 10 MG/1
10 TABLET ORAL ONCE
Status: COMPLETED | OUTPATIENT
Start: 2022-04-29 | End: 2022-04-29

## 2022-04-29 RX ADMIN — ONDANSETRON 4 MG: 4 TABLET, ORALLY DISINTEGRATING ORAL at 14:54

## 2022-04-29 RX ADMIN — LIDOCAINE HYDROCHLORIDE 15 ML: 20 SOLUTION ORAL; TOPICAL at 14:54

## 2022-04-29 RX ADMIN — DIPHENHYDRAMINE HYDROCHLORIDE 25 MG: 25 CAPSULE ORAL at 15:32

## 2022-04-29 RX ADMIN — MAGNESIUM HYDROXIDE/ALUMINUM HYDROXICE/SIMETHICONE 15 ML: 120; 1200; 1200 SUSPENSION ORAL at 14:54

## 2022-04-29 RX ADMIN — METOCLOPRAMIDE 10 MG: 10 TABLET ORAL at 15:32

## 2022-04-29 NOTE — ED PROVIDER NOTES
History     Chief Complaint   Patient presents with     Headache     Nausea, Vomiting, & Diarrhea     HPI  Alexy Carrion is a 31 year old woman with history of bipolar disorder, myofascial pain syndrome, ADD who presents for evaluation of nausea.  Patient reports that she had a migraine about 5 days ago that was difficult to control but eventually went away.  She states it was typical of her usual migraines.  Then yesterday she developed nausea that lasted throughout the day but no vomiting, no longer had a headache.  She felt a bit better this morning although did have some generalized abdominal cramps overnight.  She had a normal bagel for breakfast this morning and then for lunch she had a hot dog and as she finished the hot dogs she again felt quite nauseous and had the urge to vomit but did not vomit.  She continues to feel very nauseous, notes some epigastric abdominal discomfort that seems to go through to the back.  She notes loose stools but these are chronic for her, no change.  No fevers or chills, chest pain or difficulty breathing or palpitations.  No urinary complaints.    Allergies:  Allergies   Allergen Reactions     Aspirin Anaphylaxis and Hives     Other reaction(s): Angioedema  Swelling of throat and nausea  Other reaction(s): Wheezing     Doxycycline Anaphylaxis and GI Disturbance     Wasp Venom Protein Anaphylaxis     Clindamycin      Other reaction(s): Constipation  Nausea and Vomiting.       Problem List:    Patient Active Problem List    Diagnosis Date Noted     Attention deficit disorder 02/12/2018     Priority: Medium     Herpes labialis 12/07/2017     Priority: Medium     Non morbid obesity due to excess calories 06/20/2017     Priority: Medium     History of twin pregnancy in prior pregnancy 06/20/2017     Priority: Medium     History of postpartum hemorrhage, currently pregnant, first trimester 06/20/2017     Priority: Medium     Bee sting allergy 08/11/2015     Priority: Medium      "Trauma 05/22/2014     Priority: Medium     Gall stones 03/13/2014     Priority: Medium     Overview:   Incidental on OB ultrasound       Myofascial muscle pain 09/28/2013     Priority: Medium     Spasm of paraspinal muscle 09/28/2013     Priority: Medium     Trigger point of thoracic region 09/28/2013     Priority: Medium     Bipolar I disorder (H) 04/07/2011     Priority: Medium        Past Medical History:    Past Medical History:   Diagnosis Date     Cholestasis during pregnancy      Personal history of other medical treatment (CODE)        Past Surgical History:    Past Surgical History:   Procedure Laterality Date     HYSTEROSCOPY DIAGNOSTIC N/A 1/5/2022    Procedure: Hysteroscopy with Dilation and Currettage, with myosure;  Surgeon: Bobby Leung MD;  Location: [facility] OR     OTHER SURGICAL HISTORY      [id number],OTHER,age 4       Family History:    No pertinent family history    Social History:  Marital Status:  Legally  [3]  Social History     Tobacco Use     Smoking status: Never Smoker     Smokeless tobacco: Never Used   Vaping Use     Vaping Use: Some days     Substances: Nicotine   Substance Use Topics     Alcohol use: Yes     Alcohol/week: 0.0 standard drinks     Comment: Alcoholic Drinks/day: rarely     Drug use: No        Medications:    acetaminophen (TYLENOL) 500 MG tablet  alum & mag hydroxide-simethicone (MAALOX  ES) 400-400-40 MG/5ML SUSP suspension  ondansetron (ZOFRAN ODT) 4 MG ODT tab  EPINEPHrine (ANY BX GENERIC EQUIV) 0.3 MG/0.3ML injection 2-pack  mupirocin (BACTROBAN) 2 % external ointment          Review of Systems  Please see HPI above for pertinent positives and negatives.  All other systems reviewed and found to be negative.    Physical Exam   BP: 125/42  Pulse: 82  Temp: 97.7  F (36.5  C)  Resp: 20  Height: 172.7 cm (5' 8\")  Weight: 103.9 kg (229 lb)  SpO2: 100 %      Physical Exam  Gen: Lying in bed, alert, nontoxic, no acute distress but appears uncomfortable  HEENT: " Normocephalic and atraumatic, moist mucous membranes, conjunctiva clear  CV: Regular rate and rhythm, appears warm and well-perfused  Pulm: Clear bilaterally, normal respiratory effort  Abd: Mild epigastric and left upper quadrant tenderness, no right upper quadrant or right lower quadrant tenderness, no guarding or rebound, nondistended  Skin: No rash or other lesions  MSK: No gross deformities or swelling  Neuro: Alert and oriented x4, no focal deficits    ED Course              ED Course as of 04/30/22 0736   Fri Apr 29, 2022   1448 Patient evaluated, here with nausea and belching that started yesterday, states she felt nauseous most of the day yesterday and then had some abdominal cramping overnight that resolved in the morning, states she was feeling better this morning but then had a hotdog for lunch and immediately after finishing a hot dog felt very sick to her stomach, has been having nausea and feels like bile is rising up into her chest but has not yet vomited.  She notes some discomfort in her upper abdomen as well, does seem to radiate through to the back.  She denies any headache currently but did have a headache several days ago that she describes as typical for her migraine headaches that she felt was triggered by new LED lights at her place of work.  No fevers or chills, vitally stable here and afebrile, saturating well on room air.  Differential diagnosis includes viral gastroenteritis (has had some loose stools as well) including foodborne illness, less likely pancreatitis and biliary pathology.  Plan for basic labs, Zofran for nausea followed by GI cocktail for gastritis which is suspected, ultimately anticipate discharge pending clinical improvement.  She has no urinary symptoms and there is no concern for pregnancy to warrant additional testing in this regard.   1504 CBC unremarkable, normal white count, hemoglobin, and platelets   1527 CMP and lipase normal. On recheck patient has had some  improvement in epigastric pain, still having some nausea. Will give reglan/benadryl followed by PO challenge   1541 Patient re-evaluated, nausea improving. Discussed plan for discharge with return in 12 to 24 hours if not improving, will provide prescription for zofran     Procedures              Critical Care time:  none               Results for orders placed or performed during the hospital encounter of 04/29/22 (from the past 24 hour(s))   CBC with platelets differential    Narrative    The following orders were created for panel order CBC with platelets differential.  Procedure                               Abnormality         Status                     ---------                               -----------         ------                     CBC with platelets and d...[[id number]]                      Final result                 Please view results for these tests on the individual orders.   Comprehensive metabolic panel   Result Value Ref Range    Sodium 137 134 - 144 mmol/L    Potassium 3.8 3.5 - 5.1 mmol/L    Chloride 106 98 - 107 mmol/L    Carbon Dioxide (CO2) 25 21 - 31 mmol/L    Anion Gap 6 3 - 14 mmol/L    Urea Nitrogen 15 7 - 25 mg/dL    Creatinine 0.70 0.60 - 1.20 mg/dL    Calcium 9.4 8.6 - 10.3 mg/dL    Glucose 92 70 - 105 mg/dL    Alkaline Phosphatase 48 34 - 104 U/L    AST 13 13 - 39 U/L    ALT 10 7 - 52 U/L    Protein Total 6.9 6.4 - 8.9 g/dL    Albumin 4.3 3.5 - 5.7 g/dL    Bilirubin Total 0.3 0.3 - 1.0 mg/dL    GFR Estimate >90 >60 mL/min/1.73m2   Lipase   Result Value Ref Range    Lipase 30 11 - 82 U/L   CBC with platelets and differential   Result Value Ref Range    WBC Count 10.7 4.0 - 11.0 10e3/uL    RBC Count 4.52 3.80 - 5.20 10e6/uL    Hemoglobin 12.9 11.7 - 15.7 g/dL    Hematocrit 38.3 35.0 - 47.0 %    MCV 85 78 - 100 fL    MCH 28.5 26.5 - 33.0 pg    MCHC 33.7 31.5 - 36.5 g/dL    RDW 13.0 10.0 - 15.0 %    Platelet Count 202 150 - 450 10e3/uL    % Neutrophils 76 %    % Lymphocytes 17 %    %  Monocytes 6 %    % Eosinophils 1 %    % Basophils 0 %    % Immature Granulocytes 0 %    NRBCs per 100 WBC 0 <1 /100    Absolute Neutrophils 8.1 1.6 - 8.3 10e3/uL    Absolute Lymphocytes 1.8 0.8 - 5.3 10e3/uL    Absolute Monocytes 0.6 0.0 - 1.3 10e3/uL    Absolute Eosinophils 0.1 0.0 - 0.7 10e3/uL    Absolute Basophils 0.0 0.0 - 0.2 10e3/uL    Absolute Immature Granulocytes 0.0 <=0.4 10e3/uL    Absolute NRBCs 0.0 10e3/uL       Medications   ondansetron (ZOFRAN-ODT) ODT tab 4 mg (4 mg Oral Given 4/29/22 1454)   alum & mag hydroxide-simethicone (MAALOX) suspension 15 mL (15 mLs Oral Given 4/29/22 1454)     And   lidocaine (viscous) (XYLOCAINE) 2 % solution 15 mL (15 mLs Mouth/Throat Given 4/29/22 1454)   metoclopramide (REGLAN) tablet 10 mg (10 mg Oral Given 4/29/22 1532)   diphenhydrAMINE (BENADRYL) capsule 25 mg (25 mg Oral Given 4/29/22 1532)       Assessments & Plan (with Medical Decision Making)   31-year-old woman with history of bipolar disorder, myofascial pain syndrome, ADD who presents for evaluation of nausea that started yesterday, then resolved and now recurred today, associated with some upper abdominal pain. Vitally stable here, afebrile, saturating well on room air.  Mild tenderness to the epigastrium on exam, no chest pain or shortness of breath and very low risk for ACS, cardiac work-up not indicated.  Symptoms are consistent with probably viral gastritis including foodborne illness, less likely pancreatitis or biliary pathology.  Patient was given a GI cocktail as well as Reglan/Benadryl and Zofran with improvement in her symptoms.  She felt better on recheck, her labs were reassuring and showed no evidence of pancreatitis and liver enzymes are normal, low suspicion for biliary pathology at this time but if symptoms persist and are much more associated with repeated eating then might benefit from gallbladder ultrasound.  Patient feeling well at this time for discharge, no indication for abdominal  imaging given otherwise benign abdomen.  close outpatient follow-up and strict return precautions reviewed.    From ED discharge instructions:  You likely have a viral gastritis (stomach inflammation caused by a viral infection) causing your symptoms. Your lab tests were normal today.     Start taking zofran 4 mg every 6 hours as needed for nausea (new prescription).    Stick to a liquid diet and slowly add bland foods as tolerated over the next 24 hours.    Drink plenty of water to stay hydrated.    Take tylenol 1,000 mg (2 extra strength tablets) three times daily as needed for pain or discomfort.    You can also take maalox every 4 hours as needed (prescription) for any upper abdominal discomfort not controlled with tylenol.    Come back to the ER if worsening severe abdominal pain, new fever, vomiting and inability to eat or drink, or any other acute concerns.    Follow up in clinic early next week for a recheck if any other concerns.    I have reviewed the nursing notes.    I have reviewed the findings, diagnosis, plan and need for follow up with the patient.       Discharge Medication List as of 4/29/2022  3:47 PM      START taking these medications    Details   alum & mag hydroxide-simethicone (MAALOX  ES) 400-400-40 MG/5ML SUSP suspension Take 30 mLs by mouth every 4 hours as needed for indigestion or heartburn, Disp-355 mL, R-0, E-Prescribe      ondansetron (ZOFRAN ODT) 4 MG ODT tab Take 1 tablet (4 mg) by mouth every 6 hours as needed for nausea or vomiting, Disp-12 tablet, R-0, E-Prescribe             Final diagnoses:   Epigastric pain   Nausea       4/29/2022   [facility] Tono Jiang MD  04/30/22 3003

## 2022-04-29 NOTE — ED TRIAGE NOTES
"ED Nursing Triage Note (General)   ________________________________    Alexy Carrion is a 31 year old Female that presents to triage private car  With history of a transient Migraine with nausea and vomiting reported by patient.    Significant symptoms had onset 1 hour(s) ago.  /42   Pulse 82   Temp 97.7  F (36.5  C) (Temporal)   Resp 20   Ht 1.727 m (5' 8\")   Wt 103.9 kg (229 lb)   SpO2 100%   BMI 34.82 kg/m  t  Patient appears alert  and oriented, in no acute distress., and cooperative and calm behavior.    GCS Total = 15  Airway: intact  Breathing noted as Normal  Circulation Normal  Skin:  Normal  Action taken:  Triage to critical care immediately      PRE HOSPITAL PRIOR LIVING SITUATION Children Only       Triage Assessment     Row Name 04/29/22 1423       Triage Assessment (Adult)    Airway WDL WDL       Respiratory WDL    Respiratory WDL WDL       Skin Circulation/Temperature WDL    Skin Circulation/Temperature WDL WDL       Cardiac WDL    Cardiac WDL WDL       Peripheral/Neurovascular WDL    Peripheral Neurovascular WDL WDL       Cognitive/Neuro/Behavioral WDL    Cognitive/Neuro/Behavioral WDL WDL              "

## 2022-04-29 NOTE — DISCHARGE INSTRUCTIONS
You likely have a viral gastritis (stomach inflammation caused by a viral infection) causing your symptoms. Your lab tests were normal today.     Start taking zofran 4 mg every 6 hours as needed for nausea (new prescription).    Stick to a liquid diet and slowly add bland foods as tolerated over the next 24 hours.    Drink plenty of water to stay hydrated.    Take tylenol 1,000 mg (2 extra strength tablets) three times daily as needed for pain or discomfort.    You can also take maalox every 4 hours as needed (prescription) for any upper abdominal discomfort not controlled with tylenol.    Come back to the ER if worsening severe abdominal pain, new fever, vomiting and inability to eat or drink, or any other acute concerns.    Follow up in clinic early next week for a recheck if any other concerns.

## 2022-06-08 ENCOUNTER — OFFICE VISIT (OUTPATIENT)
Dept: FAMILY MEDICINE | Facility: OTHER | Age: 32
End: 2022-06-08
Attending: FAMILY MEDICINE
Payer: COMMERCIAL

## 2022-06-08 VITALS
DIASTOLIC BLOOD PRESSURE: 78 MMHG | OXYGEN SATURATION: 99 % | SYSTOLIC BLOOD PRESSURE: 114 MMHG | HEART RATE: 95 BPM | WEIGHT: 227 LBS | BODY MASS INDEX: 34.52 KG/M2 | RESPIRATION RATE: 16 BRPM | TEMPERATURE: 99 F

## 2022-06-08 DIAGNOSIS — Z30.430 ENCOUNTER FOR IUD INSERTION: Primary | ICD-10-CM

## 2022-06-08 DIAGNOSIS — R43.1 ABNORMAL SMELL: ICD-10-CM

## 2022-06-08 LAB
ALBUMIN UR-MCNC: NEGATIVE MG/DL
ANION GAP SERPL CALCULATED.3IONS-SCNC: 7 MMOL/L (ref 3–14)
APPEARANCE UR: CLEAR
BILIRUB UR QL STRIP: NEGATIVE
BUN SERPL-MCNC: 12 MG/DL (ref 7–25)
C TRACH DNA SPEC QL PROBE+SIG AMP: NEGATIVE
CALCIUM SERPL-MCNC: 9.4 MG/DL (ref 8.6–10.3)
CHLORIDE BLD-SCNC: 104 MMOL/L (ref 98–107)
CO2 SERPL-SCNC: 27 MMOL/L (ref 21–31)
COLOR UR AUTO: NORMAL
CREAT SERPL-MCNC: 0.77 MG/DL (ref 0.6–1.2)
GFR SERPL CREATININE-BSD FRML MDRD: >90 ML/MIN/1.73M2
GLUCOSE BLD-MCNC: 87 MG/DL (ref 70–105)
GLUCOSE UR STRIP-MCNC: NEGATIVE MG/DL
HBA1C MFR BLD: 5.1 % (ref 4–6.2)
HCG UR QL: NEGATIVE
HGB UR QL STRIP: NEGATIVE
KETONES UR STRIP-MCNC: NEGATIVE MG/DL
LEUKOCYTE ESTERASE UR QL STRIP: NEGATIVE
N GONORRHOEA DNA SPEC QL NAA+PROBE: NEGATIVE
NITRATE UR QL: NEGATIVE
PH UR STRIP: 7.5 [PH] (ref 5–9)
POTASSIUM BLD-SCNC: 4.3 MMOL/L (ref 3.5–5.1)
SODIUM SERPL-SCNC: 138 MMOL/L (ref 134–144)
SP GR UR STRIP: 1.02 (ref 1–1.03)
UROBILINOGEN UR STRIP-MCNC: NORMAL MG/DL

## 2022-06-08 PROCEDURE — 58300 INSERT INTRAUTERINE DEVICE: CPT | Performed by: FAMILY MEDICINE

## 2022-06-08 PROCEDURE — 80048 BASIC METABOLIC PNL TOTAL CA: CPT | Mod: ZL | Performed by: FAMILY MEDICINE

## 2022-06-08 PROCEDURE — 83036 HEMOGLOBIN GLYCOSYLATED A1C: CPT | Mod: ZL | Performed by: FAMILY MEDICINE

## 2022-06-08 PROCEDURE — 81025 URINE PREGNANCY TEST: CPT | Mod: ZL | Performed by: FAMILY MEDICINE

## 2022-06-08 PROCEDURE — 87491 CHLMYD TRACH DNA AMP PROBE: CPT | Mod: ZL | Performed by: FAMILY MEDICINE

## 2022-06-08 PROCEDURE — 81003 URINALYSIS AUTO W/O SCOPE: CPT | Mod: ZL | Performed by: FAMILY MEDICINE

## 2022-06-08 PROCEDURE — 250N000011 HC RX IP 250 OP 636: Performed by: FAMILY MEDICINE

## 2022-06-08 PROCEDURE — 36415 COLL VENOUS BLD VENIPUNCTURE: CPT | Mod: ZL | Performed by: FAMILY MEDICINE

## 2022-06-08 PROCEDURE — G0463 HOSPITAL OUTPT CLINIC VISIT: HCPCS | Mod: 25

## 2022-06-08 PROCEDURE — 87591 N.GONORRHOEAE DNA AMP PROB: CPT | Mod: ZL | Performed by: FAMILY MEDICINE

## 2022-06-08 RX ADMIN — LEVONORGESTREL 20 MCG: 52 INTRAUTERINE DEVICE INTRAUTERINE at 13:36

## 2022-06-08 ASSESSMENT — PAIN SCALES - GENERAL: PAINLEVEL: NO PAIN (0)

## 2022-06-08 NOTE — NURSING NOTE
"Chief Complaint   Patient presents with     IUD       Initial /78   Pulse 95   Temp 99  F (37.2  C) (Tympanic)   Resp 16   Wt 103 kg (227 lb)   LMP 06/08/2022 (Exact Date)   SpO2 99%   Breastfeeding No   BMI 34.52 kg/m   Estimated body mass index is 34.52 kg/m  as calculated from the following:    Height as of 4/29/22: 1.727 m (5' 8\").    Weight as of this encounter: 103 kg (227 lb).  Medication Reconciliation: complete    Lynn Kowalski LPN     Advanced Care Directive reviewed.       Prior to the start of the procedure and with procedural staff participation, I verbally confirmed the patient s identity using two indicators, relevant allergies, that the procedure was appropriate and matched the consent or emergent situation, and that the correct equipment/implants were available. Immediately prior to starting the procedure I conducted the Time Out with the procedural staff and re-confirmed the patient s name, procedure, and site/side. (The Joint Commission universal protocol was followed.)  Yes    Sedation (Moderate or Deep): None    "

## 2022-06-08 NOTE — PROGRESS NOTES
"Answers for HPI/ROS submitted by the patient on 2022  What is the reason for your visit today? : Mirena placement  How many servings of fruits and vegetables do you eat daily?: 2-3  On average, how many sweetened beverages do you drink each day (Examples: soda, juice, sweet tea, etc.  Do NOT count diet or artificially sweetened beverages)?: 2  How many minutes a day do you exercise enough to make your heart beat faster?: 30 to 60  How many days a week do you exercise enough to make your heart beat faster?: 3 or less  How many days per week do you miss taking your medication?: 0    Has some concerns of \"cotton candy\" smelling urine, discharge, sweat.  Admits to drinking 4-6 BANG drinks per day.  Interested in labs for ruling out glucose/diabetes.  Doesn't drink a lot of water.  Has had normal glucose screenings in pregnancy.  Last glucose levels on file range from mid-70s to 100.  Labs ordered: BMP, A1c, UA.  Recommended cutting back for other health benefits and reducing exposure to pro-inflammatory components of nutritional intake.  Noemi Barnes,  on 2022 at 1:33 PM         IUD Insertion:  CONSULT:    Is a pregnancy test required: Yes.  Was it positive or negative?  Negative  Was a consent obtained?  Yes    Subjective: Alexy Carrion is a 31 year old  presents for IUD and desires Mirena type IUD.    Patient has been given the opportunity to ask questions about all forms of birth control, including all options appropriate for Alexy Carrion. Discussed that no method of birth control, except abstinence is 100% effective against pregnancy or sexually transmitted infection.     Alexy Carrion understands she may have the IUD removed at any time. IUD should be removed by a health care provider.    The entire insertion procedure was reviewed with the patient, including care after placement.    Patient's last menstrual period was 2022 (exact date). Last sexual activity: remote; as  in " yasmeen.  UPT negative today.. No allergy to betadine or shellfish. Recent STD screening  HCG Qual Urine   Date Value Ref Range Status   06/19/2017 Positive (Pos) Negative      Comment:     Is Rh typing necessary?     hCG Urine Qualitative   Date Value Ref Range Status   06/08/2022 Negative Negative Final     Comment:     This test is for screening purposes.  Results should be interpreted along with the clinical picture.  Confirmation testing is available if warranted by ordering [id number], HCG Quantitative Pregnancy.         /78   Pulse 95   Temp 99  F (37.2  C) (Tympanic)   Resp 16   Wt 103 kg (227 lb)   LMP 06/08/2022 (Exact Date)   SpO2 99%   Breastfeeding No   BMI 34.52 kg/m      Pelvic Exam:   EG/BUS: normal genital architecture without lesions, erythema or abnormal secretions.   Vagina: moist, pink, rugae with physiologic discharge and secretions  Cervix: multiparous no lesions and pink, moist, closed, without lesion or CMT  Uterus: mid position, mobile, no pain  Adnexa: within normal limits and no masses, nodularity, tenderness    PROCEDURE NOTE: -- IUD Insertion    Reason for Insertion: contraception    Under sterile technique, cervix was visualized with speculum and prepped with Betadine solution swab x 3. Tenaculum was placed for stability. The uterus was gently straightened and sounded to 9.0 cm. IUD prepared for placement, and IUD inserted according to 's instructions without difficulty or significant resitance, and deployed at the fundus. The strings were visualized and trimmed to 3.5 cm from the external os. Tenaculum was removed and hemostasis noted. Speculum removed.  Patient tolerated procedure well.    EBL: minimal    Complications: none    ASSESSMENT:     ICD-10-CM    1. Encounter for IUD insertion  Z30.430 Pregnancy, Urine (HCG)     [place] INSERTION OF IUD FOR THERAPEUTIC PURPOSES     levonorgestrel (MIRENA) 20 MCG/DAY IUD 20 mcg   2. Abnormal smell  R43.1 GC/Chlamydia by PCR      Basic Metabolic Panel     UA reflex to Microscopic     Hemoglobin A1c        PLAN:    Given 's handouts, including when to have IUD removed, list of danger s/sx, side effects and follow up recommended. Encouraged condom use for prevention of STD. Back up contraception advised for 7 days if progestin method. Advised to call for any fever, for prolonged or severe pain or bleeding, abnormal vaginal discharge, or unable to palpate strings. She was advised to use pain medications (ibuprofen) as needed for mild to moderate pain. Advised to follow-up in clinic in 4-6 weeks for IUD string check if unable to find strings or as directed by provider.     Noemi Barnes, DO     69 y/o male with thyroid cancer.

## 2022-07-10 ENCOUNTER — HEALTH MAINTENANCE LETTER (OUTPATIENT)
Age: 32
End: 2022-07-10

## 2022-09-10 ENCOUNTER — HEALTH MAINTENANCE LETTER (OUTPATIENT)
Age: 32
End: 2022-09-10

## 2023-07-23 ENCOUNTER — HEALTH MAINTENANCE LETTER (OUTPATIENT)
Age: 33
End: 2023-07-23

## 2024-08-29 NOTE — ADDENDUM NOTE
Addended by: JOSE DANIEL ALVAREZ on: 2/26/2021 09:06 AM     Modules accepted: Orders     4 = No assist / stand by assistance

## 2024-09-15 ENCOUNTER — HEALTH MAINTENANCE LETTER (OUTPATIENT)
Age: 34
End: 2024-09-15

## 2024-10-01 ENCOUNTER — OFFICE VISIT (OUTPATIENT)
Dept: FAMILY MEDICINE | Facility: OTHER | Age: 34
End: 2024-10-01
Attending: NURSE PRACTITIONER
Payer: COMMERCIAL

## 2024-10-01 VITALS
RESPIRATION RATE: 18 BRPM | HEART RATE: 80 BPM | TEMPERATURE: 98.8 F | HEIGHT: 68 IN | DIASTOLIC BLOOD PRESSURE: 76 MMHG | BODY MASS INDEX: 39.98 KG/M2 | SYSTOLIC BLOOD PRESSURE: 118 MMHG | OXYGEN SATURATION: 99 % | WEIGHT: 263.8 LBS

## 2024-10-01 DIAGNOSIS — T63.441A ALLERGIC REACTION TO BEE STING: ICD-10-CM

## 2024-10-01 DIAGNOSIS — E66.01 MORBID OBESITY (H): Primary | ICD-10-CM

## 2024-10-01 LAB
ALBUMIN SERPL BCG-MCNC: 4.6 G/DL (ref 3.5–5.2)
ALP SERPL-CCNC: 75 U/L (ref 40–150)
ALT SERPL W P-5'-P-CCNC: 12 U/L (ref 0–50)
ANION GAP SERPL CALCULATED.3IONS-SCNC: 9 MMOL/L (ref 7–15)
AST SERPL W P-5'-P-CCNC: 18 U/L (ref 0–45)
BASOPHILS # BLD AUTO: 0 10E3/UL (ref 0–0.2)
BASOPHILS NFR BLD AUTO: 0 %
BILIRUB SERPL-MCNC: 0.5 MG/DL
BUN SERPL-MCNC: 13.3 MG/DL (ref 6–20)
CALCIUM SERPL-MCNC: 9.2 MG/DL (ref 8.8–10.4)
CHLORIDE SERPL-SCNC: 104 MMOL/L (ref 98–107)
CHOLEST SERPL-MCNC: 132 MG/DL
CREAT SERPL-MCNC: 0.79 MG/DL (ref 0.51–0.95)
EGFRCR SERPLBLD CKD-EPI 2021: >90 ML/MIN/1.73M2
EOSINOPHIL # BLD AUTO: 0.1 10E3/UL (ref 0–0.7)
EOSINOPHIL NFR BLD AUTO: 1 %
ERYTHROCYTE [DISTWIDTH] IN BLOOD BY AUTOMATED COUNT: 13 % (ref 10–15)
FASTING STATUS PATIENT QL REPORTED: YES
FASTING STATUS PATIENT QL REPORTED: YES
GLUCOSE SERPL-MCNC: 86 MG/DL (ref 70–99)
HCO3 SERPL-SCNC: 25 MMOL/L (ref 22–29)
HCT VFR BLD AUTO: 38 % (ref 35–47)
HDLC SERPL-MCNC: 45 MG/DL
HGB BLD-MCNC: 12.5 G/DL (ref 11.7–15.7)
IMM GRANULOCYTES # BLD: 0 10E3/UL
IMM GRANULOCYTES NFR BLD: 0 %
LDLC SERPL CALC-MCNC: 76 MG/DL
LYMPHOCYTES # BLD AUTO: 1.7 10E3/UL (ref 0.8–5.3)
LYMPHOCYTES NFR BLD AUTO: 21 %
MCH RBC QN AUTO: 29.1 PG (ref 26.5–33)
MCHC RBC AUTO-ENTMCNC: 32.9 G/DL (ref 31.5–36.5)
MCV RBC AUTO: 88 FL (ref 78–100)
MONOCYTES # BLD AUTO: 0.4 10E3/UL (ref 0–1.3)
MONOCYTES NFR BLD AUTO: 5 %
NEUTROPHILS # BLD AUTO: 5.9 10E3/UL (ref 1.6–8.3)
NEUTROPHILS NFR BLD AUTO: 72 %
NONHDLC SERPL-MCNC: 87 MG/DL
NRBC # BLD AUTO: 0 10E3/UL
NRBC BLD AUTO-RTO: 0 /100
PLATELET # BLD AUTO: 222 10E3/UL (ref 150–450)
POTASSIUM SERPL-SCNC: 3.8 MMOL/L (ref 3.4–5.3)
PROT SERPL-MCNC: 7.7 G/DL (ref 6.4–8.3)
RBC # BLD AUTO: 4.3 10E6/UL (ref 3.8–5.2)
SODIUM SERPL-SCNC: 138 MMOL/L (ref 135–145)
TRIGL SERPL-MCNC: 53 MG/DL
TSH SERPL DL<=0.005 MIU/L-ACNC: 1.16 UIU/ML (ref 0.3–4.2)
WBC # BLD AUTO: 8.1 10E3/UL (ref 4–11)

## 2024-10-01 PROCEDURE — 85004 AUTOMATED DIFF WBC COUNT: CPT | Mod: ZL | Performed by: NURSE PRACTITIONER

## 2024-10-01 PROCEDURE — 82040 ASSAY OF SERUM ALBUMIN: CPT | Mod: ZL | Performed by: NURSE PRACTITIONER

## 2024-10-01 PROCEDURE — G0463 HOSPITAL OUTPT CLINIC VISIT: HCPCS

## 2024-10-01 PROCEDURE — 80061 LIPID PANEL: CPT | Mod: ZL | Performed by: NURSE PRACTITIONER

## 2024-10-01 PROCEDURE — 99214 OFFICE O/P EST MOD 30 MIN: CPT | Performed by: NURSE PRACTITIONER

## 2024-10-01 PROCEDURE — 82306 VITAMIN D 25 HYDROXY: CPT | Mod: ZL | Performed by: NURSE PRACTITIONER

## 2024-10-01 PROCEDURE — 84443 ASSAY THYROID STIM HORMONE: CPT | Mod: ZL | Performed by: NURSE PRACTITIONER

## 2024-10-01 PROCEDURE — 85014 HEMATOCRIT: CPT | Mod: ZL | Performed by: NURSE PRACTITIONER

## 2024-10-01 PROCEDURE — 36415 COLL VENOUS BLD VENIPUNCTURE: CPT | Mod: ZL | Performed by: NURSE PRACTITIONER

## 2024-10-01 RX ORDER — EPINEPHRINE 0.3 MG/.3ML
0.3 INJECTION SUBCUTANEOUS
Qty: 0.6 ML | Refills: 11 | Status: SHIPPED | OUTPATIENT
Start: 2024-10-01

## 2024-10-01 ASSESSMENT — PAIN SCALES - GENERAL: PAINLEVEL: NO PAIN (0)

## 2024-10-01 NOTE — NURSING NOTE
"Chief Complaint   Patient presents with    Weight Problem       Initial /76 (BP Location: Right arm, Patient Position: Sitting)   Pulse 80   Temp 98.8  F (37.1  C) (Tympanic)   Resp 18   Ht 1.727 m (5' 8\")   Wt 119.7 kg (263 lb 12.8 oz)   LMP  (LMP Unknown)   SpO2 99%   BMI 40.11 kg/m   Estimated body mass index is 40.11 kg/m  as calculated from the following:    Height as of this encounter: 1.727 m (5' 8\").    Weight as of this encounter: 119.7 kg (263 lb 12.8 oz).  Medication Review: complete    The next two questions are to help us understand your food security.  If you are feeling you need any assistance in this area, we have resources available to support you today.           No data to display                  Health Care Directive:  Patient does not have a Health Care Directive or Living Will: Discussed advance care planning with patient; however, patient declined at this time.    Marilin Lucas LPN      "

## 2024-10-01 NOTE — PROGRESS NOTES
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  Referral to dietitian, it appears that she has some work that can be done on her diet.  Encouraged her to track all food and fluids that she takes and over the next couple weeks, she should bring this to the dietitian as well as follow-up in clinic.  She will also check with insurance to see what if any medications would be covered to assist with weight loss.  Plan to follow-up with exercise and dietary log to discuss chronic steps for    Allergy to bee's, refilled epi pen         BMI  Estimated body mass index is 40.11 kg/m  as calculated from the following:    Height as of this encounter: 1.727 m (5' 8\").    Weight as of this encounter: 119.7 kg (263 lb 12.8 oz).   Weight management plan: Discussed healthy diet and exercise guidelines        No follow-ups on file.      Ambrose Tracey is a 34 year old, presenting for the following health issues:  Weight Problem      10/1/2024     1:20 PM   Additional Questions   Roomed by Marilin Lucas LPN   Accompanied by self         10/1/2024     1:20 PM   Patient Reported Additional Medications   Patient reports taking the following new medications N/A     History of Present Illness       Reason for visit:  Issues possibly with thytoid   She is taking medications regularly.     She comes into clinic today to discuss weight concerns.  She has had significant and chronic increase in weight.  This has been quite frustrating for her.  She states she feels like she is hungry " "all the time.  If she were to go to [employer] she can have multiple sandwiches, fries, drinks to be hungry.  She has been working on dietary changes, typically has bagels and cranberries in the morning, snacks frequently throughout the day, hot soups and salads.  She previously was drinking 6 sodas a day, now doing 1-2 including Celsius 1-2 a day and water.  She reports she is exercising regularly, going to [employer] 3-4 times a week and walking on the treadmill, not doing any weightlifting.  She does work at the [employer], 60+ hours a week, on her feet a lot and also taking care of her children.  She is not currently taking any medications.  Mom  and maternal grandmother with diabetes, no thyroid disease in the family.  She has been under a lot of stress.  She has previously  from her significant other, she is dating someone who is currently in skilled nursing, gets out of 5 months.  She does report that she has difficulties falling asleep and staying asleep typically sleeps about 4 to 5 hours a night.      Objective    /76 (BP Location: Right arm, Patient Position: Sitting)   Pulse 80   Temp 98.8  F (37.1  C) (Tympanic)   Resp 18   Ht 1.727 m (5' 8\")   Wt 119.7 kg (263 lb 12.8 oz)   LMP  (LMP Unknown)   SpO2 99%   BMI 40.11 kg/m    Body mass index is 40.11 kg/m .  Physical Exam   GENERAL: alert and no distress  EYES: Eyes grossly normal to inspection  NEURO: Normal strength and tone, mentation intact and speech normal  PSYCH: mentation appears normal, affect normal/bright    Results for orders placed or performed in visit on 10/01/24   Comprehensive Metabolic Panel     Status: None   Result Value Ref Range    Sodium 138 135 - 145 mmol/L    Potassium 3.8 3.4 - 5.3 mmol/L    Carbon Dioxide (CO2) 25 22 - 29 mmol/L    Anion Gap 9 7 - 15 mmol/L    Urea Nitrogen 13.3 6.0 - 20.0 mg/dL    Creatinine 0.79 0.51 - 0.95 mg/dL    GFR Estimate >90 >60 mL/min/1.73m2    Calcium 9.2 8.8 - 10.4 mg/dL    " Chloride 104 98 - 107 mmol/L    Glucose 86 70 - 99 mg/dL    Alkaline Phosphatase 75 40 - 150 U/L    AST 18 0 - 45 U/L    ALT 12 0 - 50 U/L    Protein Total 7.7 6.4 - 8.3 g/dL    Albumin 4.6 3.5 - 5.2 g/dL    Bilirubin Total 0.5 <=1.2 mg/dL    Patient Fasting > 8hrs? Yes    Lipid Panel     Status: Abnormal   Result Value Ref Range    Cholesterol 132 <200 mg/dL    Triglycerides 53 <150 mg/dL    Direct Measure HDL 45 (L) >=50 mg/dL    LDL Cholesterol Calculated 76 <100 mg/dL    Non HDL Cholesterol 87 <130 mg/dL    Patient Fasting > 8hrs? Yes     Narrative    Cholesterol  Desirable: < 200 mg/dL  Borderline High: 200 - 239 mg/dL  High: >= 240 mg/dL    Triglycerides  Normal: < 150 mg/dL  Borderline High: 150 - 199 mg/dL  High: 200-499 mg/dL  Very High: >= 500 mg/dL    Direct Measure HDL  Female: >= 50 mg/dL   Male: >= 40 mg/dL    LDL Cholesterol  Desirable: < 100 mg/dL  Above Desirable: 100 - 129 mg/dL   Borderline High: 130 - 159 mg/dL   High:  160 - 189 mg/dL   Very High: >= 190 mg/dL    Non HDL Cholesterol  Desirable: < 130 mg/dL  Above Desirable: 130 - 159 mg/dL  Borderline High: 160 - 189 mg/dL  High: 190 - 219 mg/dL  Very High: >= 220 mg/dL   TSH Reflex GH     Status: Normal   Result Value Ref Range    TSH 1.16 0.30 - 4.20 uIU/mL   CBC with platelets and differential     Status: None   Result Value Ref Range    WBC Count 8.1 4.0 - 11.0 10e3/uL    RBC Count 4.30 3.80 - 5.20 10e6/uL    Hemoglobin 12.5 11.7 - 15.7 g/dL    Hematocrit 38.0 35.0 - 47.0 %    MCV 88 78 - 100 fL    MCH 29.1 26.5 - 33.0 pg    MCHC 32.9 31.5 - 36.5 g/dL    RDW 13.0 10.0 - 15.0 %    Platelet Count 222 150 - 450 10e3/uL    % Neutrophils 72 %    % Lymphocytes 21 %    % Monocytes 5 %    % Eosinophils 1 %    % Basophils 0 %    % Immature Granulocytes 0 %    NRBCs per 100 WBC 0 <1 /100    Absolute Neutrophils 5.9 1.6 - 8.3 10e3/uL    Absolute Lymphocytes 1.7 0.8 - 5.3 10e3/uL    Absolute Monocytes 0.4 0.0 - 1.3 10e3/uL    Absolute Eosinophils 0.1  0.0 - 0.7 10e3/uL    Absolute Basophils 0.0 0.0 - 0.2 10e3/uL    Absolute Immature Granulocytes 0.0 <=0.4 10e3/uL    Absolute NRBCs 0.0 10e3/uL   CBC and Differential     Status: None    Narrative    The following orders were created for panel order CBC and Differential.  Procedure                               Abnormality         Status                     ---------                               -----------         ------                     CBC with platelets and d...[[id number]]                      Final result                 Please view results for these tests on the individual orders.             Signed Electronically by: BRYANT Jones CNP

## 2024-10-02 LAB — VIT D+METAB SERPL-MCNC: 16 NG/ML (ref 20–50)

## 2024-11-06 ENCOUNTER — OFFICE VISIT (OUTPATIENT)
Dept: OBGYN | Facility: OTHER | Age: 34
End: 2024-11-06
Attending: NURSE PRACTITIONER
Payer: COMMERCIAL

## 2024-11-06 VITALS
BODY MASS INDEX: 37.1 KG/M2 | SYSTOLIC BLOOD PRESSURE: 128 MMHG | WEIGHT: 244 LBS | DIASTOLIC BLOOD PRESSURE: 74 MMHG | HEART RATE: 99 BPM

## 2024-11-06 DIAGNOSIS — N89.8 VAGINAL DISCHARGE: ICD-10-CM

## 2024-11-06 DIAGNOSIS — B96.89 BV (BACTERIAL VAGINOSIS): ICD-10-CM

## 2024-11-06 DIAGNOSIS — Z11.3 SCREEN FOR STD (SEXUALLY TRANSMITTED DISEASE): ICD-10-CM

## 2024-11-06 DIAGNOSIS — Z12.4 PAP SMEAR FOR CERVICAL CANCER SCREENING: ICD-10-CM

## 2024-11-06 DIAGNOSIS — Z30.432 ENCOUNTER FOR IUD REMOVAL: Primary | ICD-10-CM

## 2024-11-06 DIAGNOSIS — N76.0 BV (BACTERIAL VAGINOSIS): ICD-10-CM

## 2024-11-06 LAB
BACTERIAL VAGINOSIS VAG-IMP: POSITIVE
C TRACH DNA SPEC QL PROBE+SIG AMP: NEGATIVE
CANDIDA DNA VAG QL NAA+PROBE: NOT DETECTED
CANDIDA GLABRATA / CANDIDA KRUSEI DNA: NOT DETECTED
N GONORRHOEA DNA SPEC QL NAA+PROBE: NEGATIVE
T VAGINALIS DNA VAG QL NAA+PROBE: NOT DETECTED

## 2024-11-06 PROCEDURE — G0463 HOSPITAL OUTPT CLINIC VISIT: HCPCS | Mod: 25

## 2024-11-06 PROCEDURE — 99213 OFFICE O/P EST LOW 20 MIN: CPT | Mod: 25 | Performed by: NURSE PRACTITIONER

## 2024-11-06 PROCEDURE — 58301 REMOVE INTRAUTERINE DEVICE: CPT | Performed by: NURSE PRACTITIONER

## 2024-11-06 PROCEDURE — G0123 SCREEN CERV/VAG THIN LAYER: HCPCS | Performed by: NURSE PRACTITIONER

## 2024-11-06 PROCEDURE — 87624 HPV HI-RISK TYP POOLED RSLT: CPT | Mod: ZL | Performed by: NURSE PRACTITIONER

## 2024-11-06 PROCEDURE — 87491 CHLMYD TRACH DNA AMP PROBE: CPT | Mod: ZL | Performed by: NURSE PRACTITIONER

## 2024-11-06 PROCEDURE — 0352U MULTIPLEX VAGINAL PANEL BY PCR: CPT | Mod: ZL | Performed by: NURSE PRACTITIONER

## 2024-11-06 PROCEDURE — G0463 HOSPITAL OUTPT CLINIC VISIT: HCPCS

## 2024-11-06 RX ORDER — METRONIDAZOLE 500 MG/1
500 TABLET ORAL 2 TIMES DAILY
Qty: 14 TABLET | Refills: 0 | Status: SHIPPED | OUTPATIENT
Start: 2024-11-06 | End: 2024-11-13

## 2024-11-06 ASSESSMENT — PAIN SCALES - GENERAL: PAINLEVEL_OUTOF10: NO PAIN (0)
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
## 2024-11-06 NOTE — NURSING NOTE
"Chief Complaint   Patient presents with    IUD     Patient is here for iud removal, patient does not want any other form of birth control.   Initial /74   Pulse 99   Wt 110.7 kg (244 lb)   LMP  (LMP Unknown)   BMI 37.10 kg/m   Estimated body mass index is 37.1 kg/m  as calculated from the following:    Height as of 10/1/24: 1.727 m (5' 8\").    Weight as of this encounter: 110.7 kg (244 lb).  Medication Reconciliation: complete          Umm Green LPN    "

## 2024-11-08 LAB
BKR AP ASSOCIATED HPV REPORT: NORMAL
BKR LAB AP GYN ADEQUACY: NORMAL
BKR LAB AP GYN INTERPRETATION: NORMAL
BKR LAB AP PREVIOUS ABNORMAL: NORMAL
HPV HR 12 DNA CVX QL NAA+PROBE: NEGATIVE
HPV16 DNA CVX QL NAA+PROBE: NEGATIVE
HPV18 DNA CVX QL NAA+PROBE: NEGATIVE
HUMAN PAPILLOMA VIRUS FINAL DIAGNOSIS: NORMAL
PATH REPORT.COMMENTS IMP SPEC: NORMAL
PATH REPORT.COMMENTS IMP SPEC: NORMAL
PATH REPORT.RELEVANT HX SPEC: NORMAL

## 2024-12-24 ENCOUNTER — APPOINTMENT (OUTPATIENT)
Dept: GENERAL RADIOLOGY | Facility: OTHER | Age: 34
End: 2024-12-24
Attending: FAMILY MEDICINE
Payer: COMMERCIAL

## 2024-12-24 ENCOUNTER — APPOINTMENT (OUTPATIENT)
Dept: ULTRASOUND IMAGING | Facility: OTHER | Age: 34
End: 2024-12-24
Attending: FAMILY MEDICINE
Payer: COMMERCIAL

## 2024-12-24 ENCOUNTER — HOSPITAL ENCOUNTER (EMERGENCY)
Facility: OTHER | Age: 34
Discharge: HOME | End: 2024-12-24
Attending: FAMILY MEDICINE
Payer: COMMERCIAL

## 2024-12-24 VITALS
SYSTOLIC BLOOD PRESSURE: 114 MMHG | HEIGHT: 70 IN | OXYGEN SATURATION: 99 % | BODY MASS INDEX: 37.94 KG/M2 | TEMPERATURE: 99.1 F | WEIGHT: 265 LBS | HEART RATE: 70 BPM | RESPIRATION RATE: 13 BRPM | DIASTOLIC BLOOD PRESSURE: 46 MMHG

## 2024-12-24 DIAGNOSIS — K80.20 GALLSTONES: Primary | ICD-10-CM

## 2024-12-24 DIAGNOSIS — M62.830 SPASM OF THORACIC BACK MUSCLE: ICD-10-CM

## 2024-12-24 LAB
ALBUMIN SERPL BCG-MCNC: 4.3 G/DL (ref 3.5–5.2)
ALBUMIN UR-MCNC: NEGATIVE MG/DL
ALP SERPL-CCNC: 76 U/L (ref 40–150)
ALT SERPL W P-5'-P-CCNC: 14 U/L (ref 0–50)
ANION GAP SERPL CALCULATED.3IONS-SCNC: 8 MMOL/L (ref 7–15)
APPEARANCE UR: CLEAR
APTT PPP: 42 SECONDS (ref 22–38)
AST SERPL W P-5'-P-CCNC: 22 U/L (ref 0–45)
BASOPHILS # BLD AUTO: 0 10E3/UL (ref 0–0.2)
BASOPHILS NFR BLD AUTO: 0 %
BILIRUB SERPL-MCNC: 0.5 MG/DL
BILIRUB UR QL STRIP: NEGATIVE
BUN SERPL-MCNC: 14.3 MG/DL (ref 6–20)
CALCIUM SERPL-MCNC: 9.2 MG/DL (ref 8.8–10.4)
CHLORIDE SERPL-SCNC: 103 MMOL/L (ref 98–107)
COLOR UR AUTO: ABNORMAL
CREAT SERPL-MCNC: 0.73 MG/DL (ref 0.51–0.95)
D DIMER PPP FEU-MCNC: 0.37 UG/ML FEU (ref 0–0.5)
EGFRCR SERPLBLD CKD-EPI 2021: >90 ML/MIN/1.73M2
EOSINOPHIL # BLD AUTO: 0.1 10E3/UL (ref 0–0.7)
EOSINOPHIL NFR BLD AUTO: 2 %
ERYTHROCYTE [DISTWIDTH] IN BLOOD BY AUTOMATED COUNT: 12.6 % (ref 10–15)
GLUCOSE SERPL-MCNC: 107 MG/DL (ref 70–99)
GLUCOSE UR STRIP-MCNC: NEGATIVE MG/DL
HCO3 SERPL-SCNC: 26 MMOL/L (ref 22–29)
HCT VFR BLD AUTO: 38.2 % (ref 35–47)
HGB BLD-MCNC: 12.8 G/DL (ref 11.7–15.7)
HGB UR QL STRIP: NEGATIVE
HOLD SPECIMEN: NORMAL
IMM GRANULOCYTES # BLD: 0 10E3/UL
IMM GRANULOCYTES NFR BLD: 0 %
INR PPP: 1.11 (ref 0.85–1.15)
KETONES UR STRIP-MCNC: NEGATIVE MG/DL
LEUKOCYTE ESTERASE UR QL STRIP: NEGATIVE
LIPASE SERPL-CCNC: 25 U/L (ref 13–60)
LYMPHOCYTES # BLD AUTO: 1.6 10E3/UL (ref 0.8–5.3)
LYMPHOCYTES NFR BLD AUTO: 17 %
MCH RBC QN AUTO: 28.8 PG (ref 26.5–33)
MCHC RBC AUTO-ENTMCNC: 33.5 G/DL (ref 31.5–36.5)
MCV RBC AUTO: 86 FL (ref 78–100)
MONOCYTES # BLD AUTO: 0.6 10E3/UL (ref 0–1.3)
MONOCYTES NFR BLD AUTO: 6 %
MUCOUS THREADS #/AREA URNS LPF: PRESENT /LPF
NEUTROPHILS # BLD AUTO: 6.9 10E3/UL (ref 1.6–8.3)
NEUTROPHILS NFR BLD AUTO: 75 %
NITRATE UR QL: NEGATIVE
NRBC # BLD AUTO: 0 10E3/UL
NRBC BLD AUTO-RTO: 0 /100
PH UR STRIP: 7.5 [PH] (ref 5–9)
PLATELET # BLD AUTO: 232 10E3/UL (ref 150–450)
POTASSIUM SERPL-SCNC: 3.9 MMOL/L (ref 3.4–5.3)
PROT SERPL-MCNC: 7.6 G/DL (ref 6.4–8.3)
RBC # BLD AUTO: 4.45 10E6/UL (ref 3.8–5.2)
RBC URINE: <1 /HPF
SODIUM SERPL-SCNC: 137 MMOL/L (ref 135–145)
SP GR UR STRIP: 1.02 (ref 1–1.03)
UROBILINOGEN UR STRIP-MCNC: NORMAL MG/DL
WBC # BLD AUTO: 9.2 10E3/UL (ref 4–11)
WBC URINE: 1 /HPF

## 2024-12-24 PROCEDURE — 81003 URINALYSIS AUTO W/O SCOPE: CPT | Performed by: FAMILY MEDICINE

## 2024-12-24 PROCEDURE — 76705 ECHO EXAM OF ABDOMEN: CPT

## 2024-12-24 PROCEDURE — 20552 NJX 1/MLT TRIGGER POINT 1/2: CPT | Performed by: FAMILY MEDICINE

## 2024-12-24 PROCEDURE — 84155 ASSAY OF PROTEIN SERUM: CPT | Performed by: FAMILY MEDICINE

## 2024-12-24 PROCEDURE — 85379 FIBRIN DEGRADATION QUANT: CPT | Performed by: FAMILY MEDICINE

## 2024-12-24 PROCEDURE — 93010 ELECTROCARDIOGRAM REPORT: CPT | Performed by: INTERNAL MEDICINE

## 2024-12-24 PROCEDURE — 71046 X-RAY EXAM CHEST 2 VIEWS: CPT

## 2024-12-24 PROCEDURE — 250N000011 HC RX IP 250 OP 636: Performed by: FAMILY MEDICINE

## 2024-12-24 PROCEDURE — 250N000009 HC RX 250: Performed by: FAMILY MEDICINE

## 2024-12-24 PROCEDURE — 82435 ASSAY OF BLOOD CHLORIDE: CPT | Performed by: FAMILY MEDICINE

## 2024-12-24 PROCEDURE — 99285 EMERGENCY DEPT VISIT HI MDM: CPT | Mod: 25 | Performed by: FAMILY MEDICINE

## 2024-12-24 PROCEDURE — 99284 EMERGENCY DEPT VISIT MOD MDM: CPT | Mod: 25 | Performed by: FAMILY MEDICINE

## 2024-12-24 PROCEDURE — 85610 PROTHROMBIN TIME: CPT | Performed by: FAMILY MEDICINE

## 2024-12-24 PROCEDURE — 85730 THROMBOPLASTIN TIME PARTIAL: CPT | Performed by: FAMILY MEDICINE

## 2024-12-24 PROCEDURE — 83690 ASSAY OF LIPASE: CPT | Performed by: FAMILY MEDICINE

## 2024-12-24 PROCEDURE — 85025 COMPLETE CBC W/AUTO DIFF WBC: CPT | Performed by: FAMILY MEDICINE

## 2024-12-24 PROCEDURE — 96374 THER/PROPH/DIAG INJ IV PUSH: CPT | Performed by: FAMILY MEDICINE

## 2024-12-24 PROCEDURE — 36415 COLL VENOUS BLD VENIPUNCTURE: CPT | Performed by: FAMILY MEDICINE

## 2024-12-24 PROCEDURE — 96376 TX/PRO/DX INJ SAME DRUG ADON: CPT | Mod: XU | Performed by: FAMILY MEDICINE

## 2024-12-24 PROCEDURE — 96375 TX/PRO/DX INJ NEW DRUG ADDON: CPT | Mod: XU | Performed by: FAMILY MEDICINE

## 2024-12-24 PROCEDURE — 93005 ELECTROCARDIOGRAM TRACING: CPT | Mod: XU | Performed by: FAMILY MEDICINE

## 2024-12-24 RX ORDER — FENTANYL CITRATE 50 UG/ML
75 INJECTION, SOLUTION INTRAMUSCULAR; INTRAVENOUS ONCE
Status: COMPLETED | OUTPATIENT
Start: 2024-12-24 | End: 2024-12-24

## 2024-12-24 RX ORDER — FENTANYL CITRATE 50 UG/ML
50 INJECTION, SOLUTION INTRAMUSCULAR; INTRAVENOUS ONCE
Status: COMPLETED | OUTPATIENT
Start: 2024-12-24 | End: 2024-12-24

## 2024-12-24 RX ORDER — KETOROLAC TROMETHAMINE 30 MG/ML
30 INJECTION, SOLUTION INTRAMUSCULAR; INTRAVENOUS ONCE
Status: COMPLETED | OUTPATIENT
Start: 2024-12-24 | End: 2024-12-24

## 2024-12-24 RX ORDER — OXYCODONE HYDROCHLORIDE 5 MG/1
5 TABLET ORAL EVERY 6 HOURS PRN
Qty: 12 TABLET | Refills: 0 | Status: SHIPPED | OUTPATIENT
Start: 2024-12-24

## 2024-12-24 RX ADMIN — KETOROLAC TROMETHAMINE 30 MG: 30 INJECTION, SOLUTION INTRAMUSCULAR at 07:21

## 2024-12-24 RX ADMIN — FENTANYL CITRATE 50 MCG: 50 INJECTION INTRAMUSCULAR; INTRAVENOUS at 08:10

## 2024-12-24 RX ADMIN — FENTANYL CITRATE 75 MCG: 50 INJECTION INTRAMUSCULAR; INTRAVENOUS at 07:21

## 2024-12-24 RX ADMIN — LIDOCAINE HYDROCHLORIDE 5 ML: 10 INJECTION, SOLUTION EPIDURAL; INFILTRATION; INTRACAUDAL; PERINEURAL at 11:40

## 2024-12-24 ASSESSMENT — COLUMBIA-SUICIDE SEVERITY RATING SCALE - C-SSRS
6. HAVE YOU EVER DONE ANYTHING, STARTED TO DO ANYTHING, OR PREPARED TO DO ANYTHING TO END YOUR LIFE?: NO
1. IN THE PAST MONTH, HAVE YOU WISHED YOU WERE DEAD OR WISHED YOU COULD GO TO SLEEP AND NOT WAKE UP?: NO
2. HAVE YOU ACTUALLY HAD ANY THOUGHTS OF KILLING YOURSELF IN THE PAST MONTH?: NO

## 2024-12-24 ASSESSMENT — ACTIVITIES OF DAILY LIVING (ADL)
ADLS_ACUITY_SCORE: 41

## 2024-12-24 ASSESSMENT — ENCOUNTER SYMPTOMS
FEVER: 0
CHILLS: 0
DYSURIA: 0
CARDIOVASCULAR NEGATIVE: 1
DIARRHEA: 0
RESPIRATORY NEGATIVE: 1

## 2024-12-24 NOTE — DISCHARGE INSTRUCTIONS
You can utilize acetaminophen  and ibuprofen to help with pain  Oxycodone as needed for worse pain.  Referral placed to general surgery to discuss potential gallbladder removal   Return to the ED if you have severe, uncontrolled pain, fever, or feel quite ill as this could signify gallbladder infection.  Eat a low fat diet

## 2024-12-24 NOTE — ED TRIAGE NOTES
Pt arrives via private vehicle from home with c/o severe back and chest pain. Pt states she woke up this morning with pain and it has only gotten worse. Pt tearful throughout triage. Denies any trauma to area. Denies any OTC meds PTA. Pt reports moving and breathing makes pain worse.      Triage Assessment (Adult)       Row Name 12/24/24 0705          Triage Assessment    Airway WDL WDL        Respiratory WDL    Respiratory WDL WDL        Skin Circulation/Temperature WDL    Skin Circulation/Temperature WDL WDL        Cardiac WDL    Cardiac WDL X        Peripheral/Neurovascular WDL    Peripheral Neurovascular WDL WDL        Cognitive/Neuro/Behavioral WDL    Cognitive/Neuro/Behavioral WDL WDL

## 2024-12-24 NOTE — ED PROVIDER NOTES
"  History     Chief Complaint   Patient presents with    Back Pain    Chest Pain     HPI  Alexy Love is a 34 year old female who developed sudden sharp 10/10 RUQ, right side chest and back pain on her way to work this morning. Pain started in RUQ, then spread to her back. Felt nauseated and \"threw up bile.\" No history of similar. No recent travel. No illness, cough, f/c/s. Normal BM.     Allergies:  Allergies   Allergen Reactions    Aspirin Anaphylaxis and Hives     Other reaction(s): Angioedema  Swelling of throat and nausea  Other reaction(s): Wheezing    Doxycycline Anaphylaxis and GI Disturbance    Wasp Venom Protein Anaphylaxis    Clindamycin      Other reaction(s): Constipation  Nausea and Vomiting.       Problem List:    Patient Active Problem List    Diagnosis Date Noted    Attention deficit disorder 02/12/2018     Priority: Medium    Herpes labialis 12/07/2017     Priority: Medium    Non morbid obesity due to excess calories 06/20/2017     Priority: Medium    History of twin pregnancy in prior pregnancy 06/20/2017     Priority: Medium    History of postpartum hemorrhage, currently pregnant, first trimester 06/20/2017     Priority: Medium    Bee sting allergy 08/11/2015     Priority: Medium    Trauma 05/22/2014     Priority: Medium    Gall stones 03/13/2014     Priority: Medium     Overview:   Incidental on OB ultrasound      Myofascial muscle pain 09/28/2013     Priority: Medium    Spasm of paraspinal muscle 09/28/2013     Priority: Medium    Trigger point of thoracic region 09/28/2013     Priority: Medium    Bipolar I disorder (H) 04/07/2011     Priority: Medium        Past Medical History:    Past Medical History:   Diagnosis Date    Cholestasis during pregnancy     Personal history of other medical treatment (CODE)        Past Surgical History:    Past Surgical History:   Procedure Laterality Date    HYSTEROSCOPY DIAGNOSTIC N/A 1/5/2022    Procedure: Hysteroscopy with Dilation and Currettage, with " "myosure;  Surgeon: Bobby Leung MD;  Location:  OR    OTHER SURGICAL HISTORY      [id number],OTHER,age 4       Family History:    History reviewed. No pertinent family history.    Social History:  Marital Status:  Legally  [3]  Social History     Tobacco Use    Smoking status: Never    Smokeless tobacco: Never   Vaping Use    Vaping status: Some Days    Substances: Nicotine   Substance Use Topics    Alcohol use: Yes     Alcohol/week: 0.0 standard drinks of alcohol     Comment: Alcoholic Drinks/day: rarely    Drug use: No        Medications:    oxyCODONE (ROXICODONE) 5 MG tablet  EPINEPHrine (ANY BX GENERIC EQUIV) 0.3 MG/0.3ML injection 2-pack          Review of Systems   Constitutional:  Negative for chills and fever.   HENT: Negative.     Respiratory: Negative.     Cardiovascular: Negative.    Gastrointestinal:  Negative for diarrhea.   Genitourinary:  Negative for dysuria.       Physical Exam   BP: (!) 159/101  Pulse: 83  Temp: 99.1  F (37.3  C)  Resp: 20  Height: 176.5 cm (5' 9.5\")  Weight: 120.2 kg (265 lb)  SpO2: 100 %      Physical Exam  Vitals reviewed.   Constitutional:       Appearance: She is obese.      Comments: Crying in pain   Eyes:      Pupils: Pupils are equal, round, and reactive to light.   Cardiovascular:      Rate and Rhythm: Normal rate and regular rhythm.      Heart sounds: Normal heart sounds.   Pulmonary:      Effort: Pulmonary effort is normal.      Breath sounds: Normal breath sounds.   Chest:      Chest wall: Tenderness present.      Comments: Right side pain  Abdominal:      Palpations: Abdomen is soft.      Tenderness: There is abdominal tenderness.      Comments: Significant RUQ tenderness   Musculoskeletal:      Comments: Tender in right thoracic paraspinous muscles   Skin:     General: Skin is warm.      Capillary Refill: Capillary refill takes less than 2 seconds.   Neurological:      General: No focal deficit present.      Mental Status: She is alert.   Psychiatric:        "  Mood and Affect: Mood normal.         Behavior: Behavior normal.         ED Course     ED Course as of 12/24/24 1546   Tue Dec 24, 2024   0724 Ordered fentanyl and ketorolac for pain  Considering gallbladder disease, PE, musculoskeletal, pleurisy   0751 Still having pain,RUQ and right mid back scapula to rib border   0842 Initial labs unremarkable. X-ray normal. Waiting on ultrasound results   1124 Still had pain in back, right side muscles. She started with RUQ pain, but due to severe pain believes she may have some muscle spasm. Provided trigger injection with relief     [facility]    Procedure: Trigger injection    Date/Time: 12/24/2024 11:31 AM    Performed by: Tristin Briscoe MD  Authorized by: Tristin Briscoe MD    Risks, benefits and alternatives discussed.       ANESTHESIA    Local Anesthetic:  Lidocaine 1% without epinephrine      PROCEDURE  Describe Procedure: Reviewed risks and benefits of injection. Patient gave verbal informed consent to proceed.   PROCEDURE: Sites of maximal tenderness were cleansed with alcohol.  Trigger point injection(s) with 1.5 mL of 1% plain lidocaine performed at 2 sites in right thoracic muscles. This was well tolerated, and followed by pain improvement.        Patient Tolerance:  Patient tolerated the procedure well with no immediate complications                EKG Interpretation:      Interpreted by Tristin Briscoe MD  Time reviewed: 0709  Symptoms at time of EKG: right pleuritic pain  Rhythm: normal sinus   Rate: normal  Axis: normal  Ectopy: none  Conduction: normal  ST Segments/ T Waves: No ST-T wave changes  Q Waves: none  Comparison to prior: No old EKG available    Clinical Impression: normal EKG      Critical Care time:  none             Results for orders placed or performed during the hospital encounter of 12/24/24 (from the past 24 hours)   [facility] Draw    Narrative    The following orders were created for panel order [facility]  Draw.  Procedure                               Abnormality         Status                     ---------                               -----------         ------                     Extra Blue Top Tube[[id number]]                              Final result               Extra Red Top Tube[[id number]]                               Final result               Extra Green Top (Lithium...[[id number]]                      Final result               Extra Purple Top Tube[[id number]]                            Final result               Extra Green Top (Lithium...[[id number]]                      Final result                 Please view results for these tests on the individual orders.   Extra Blue Top Tube   Result Value Ref Range    Hold Specimen JIC    Extra Red Top Tube   Result Value Ref Range    Hold Specimen JIC    Extra Green Top (Lithium Heparin) Tube   Result Value Ref Range    Hold Specimen JIC    Extra Purple Top Tube   Result Value Ref Range    Hold Specimen JIC    Extra Green Top (Lithium Heparin) ON ICE   Result Value Ref Range    Hold Specimen JIC    Lipase   Result Value Ref Range    Lipase 25 13 - 60 U/L   CBC with platelets differential    Narrative    The following orders were created for panel order CBC with platelets differential.  Procedure                               Abnormality         Status                     ---------                               -----------         ------                     CBC with platelets and d...[[id number]]                      Final result                 Please view results for these tests on the individual orders.   Comprehensive metabolic panel   Result Value Ref Range    Sodium 137 135 - 145 mmol/L    Potassium 3.9 3.4 - 5.3 mmol/L    Carbon Dioxide (CO2) 26 22 - 29 mmol/L    Anion Gap 8 7 - 15 mmol/L    Urea Nitrogen 14.3 6.0 - 20.0 mg/dL    Creatinine 0.73 0.51 - 0.95 mg/dL    GFR Estimate >90 >60 mL/min/1.73m2    Calcium 9.2 8.8 - 10.4 mg/dL    Chloride 103 98 - 107  mmol/L    Glucose 107 (H) 70 - 99 mg/dL    Alkaline Phosphatase 76 40 - 150 U/L    AST 22 0 - 45 U/L    ALT 14 0 - 50 U/L    Protein Total 7.6 6.4 - 8.3 g/dL    Albumin 4.3 3.5 - 5.2 g/dL    Bilirubin Total 0.5 <=1.2 mg/dL   D dimer quantitative   Result Value Ref Range    D-Dimer Quantitative 0.37 0.00 - 0.50 ug/mL FEU    Narrative    This D-dimer assay is intended for use in conjunction with a clinical pretest probability assessment model to exclude pulmonary embolism (PE) and deep venous thrombosis (DVT) in outpatients suspected of PE or DVT. The cut-off value is 0.50 ug/mL FEU.   INR   Result Value Ref Range    INR 1.11 0.85 - 1.15   Partial thromboplastin time   Result Value Ref Range    aPTT 42 (H) 22 - 38 Seconds   CBC with platelets and differential   Result Value Ref Range    WBC Count 9.2 4.0 - 11.0 10e3/uL    RBC Count 4.45 3.80 - 5.20 10e6/uL    Hemoglobin 12.8 11.7 - 15.7 g/dL    Hematocrit 38.2 35.0 - 47.0 %    MCV 86 78 - 100 fL    MCH 28.8 26.5 - 33.0 pg    MCHC 33.5 31.5 - 36.5 g/dL    RDW 12.6 10.0 - 15.0 %    Platelet Count 232 150 - 450 10e3/uL    % Neutrophils 75 %    % Lymphocytes 17 %    % Monocytes 6 %    % Eosinophils 2 %    % Basophils 0 %    % Immature Granulocytes 0 %    NRBCs per 100 WBC 0 <1 /100    Absolute Neutrophils 6.9 1.6 - 8.3 10e3/uL    Absolute Lymphocytes 1.6 0.8 - 5.3 10e3/uL    Absolute Monocytes 0.6 0.0 - 1.3 10e3/uL    Absolute Eosinophils 0.1 0.0 - 0.7 10e3/uL    Absolute Basophils 0.0 0.0 - 0.2 10e3/uL    Absolute Immature Granulocytes 0.0 <=0.4 10e3/uL    Absolute NRBCs 0.0 10e3/uL   XR Chest 2 Views    Narrative    PROCEDURE:  XR CHEST 2 VIEWS    HISTORY: right pleuritic chest pain, .    COMPARISON:  None.    FINDINGS:  The cardiomediastinal contours are normal. The trachea is midline.  No focal consolidation, effusion or pneumothorax.    No suspicious osseous lesion or subdiaphragmatic free air.      Impression    IMPRESSION:      No pneumothorax or pleural effusion.       LUANA DEXTER MD         SYSTEM ID:  [id number]   US Abdomen Limited (RUQ)    Narrative    PROCEDURES: US ABDOMEN LIMITED    HISTORY: Female, age 34 years, RUQ pain    TECHNIQUE: Ultrasound of the upper abdomen was performed.    COMPARISON: No relevant prior imaging.     FINDINGS:    LIVER: Unremarkable.    GALLBLADDER: Numerous echogenic stones and sludge in the gallbladder  lumen. No pericholecystic fluid. No sonographic Steven's sign.    Common bile duct diameter: 3.1 mm.    ABDOMINAL AORTA AND IVC: The visualized portions of the abdominal  aorta are unremarkable.    PANCREAS:The visualized portions of the pancreas are normal.    RIGHT KIDNEY: The right kidney is normal. The right kidney measures  11.6 centimeters in length.    OTHER: There is no free fluid in the upper abdomen.      Impression    IMPRESSION: Cholelithiasis without evidence of cholecystitis or  biliary duct obstruction/dilatation.    SILVIA TORRES MD         SYSTEM ID:  [id number]   UA with Microscopic reflex to Culture    Specimen: Urine, Midstream   Result Value Ref Range    Color Urine Light Yellow Colorless, Straw, Light Yellow, Yellow    Appearance Urine Clear Clear    Glucose Urine Negative Negative mg/dL    Bilirubin Urine Negative Negative    Ketones Urine Negative Negative mg/dL    Specific Gravity Urine 1.016 1.000 - 1.030    Blood Urine Negative Negative    pH Urine 7.5 5.0 - 9.0    Protein Albumin Urine Negative Negative mg/dL    Urobilinogen Urine Normal Normal, 2.0 mg/dL    Nitrite Urine Negative Negative    Leukocyte Esterase Urine Negative Negative    Mucus Urine Present (A) None Seen /LPF    RBC Urine <1 <=2 /HPF    WBC Urine 1 <=5 /HPF    Narrative    Urine Culture not indicated       Medications   ketorolac (TORADOL) injection 30 mg (30 mg Intravenous $Given 12/24/24 0721)   fentaNYL (PF) (SUBLIMAZE) injection 75 mcg (75 mcg Intravenous $Given 12/24/24 0721)   fentaNYL (PF) (SUBLIMAZE) injection 50 mcg (50 mcg  Intravenous $Given 12/24/24 2840)   lidocaine 1 % 5 mL (5 mLs Other $Given by Other 12/24/24 1146)       Assessments & Plan (with Medical Decision Making)     I have reviewed the nursing notes.    I have reviewed the findings, diagnosis, plan and need for follow up with the patient.    Medical Decision Making  The patient's presentation was of moderate complexity (an acute complicated injury).    The patient's evaluation involved:  strong consideration of a test (see separate area of note for details) that was ultimately deferred    The patient's management necessitated high risk (a parenteral controlled substance).    Developed sudden right upper quadrant pain this morning. Workup reveals normal labs.  Normal chest x-ray.  Based on positive Steven sign on exam, suspect gallbladder disease.  Ultrasound shows cholelithiasis.  She may have passed a stone, but given recent development still has normal labs.    Has some tenderness to right thoracic paraspinous muscles.  Patient believes this is in response to the right upper quadrant pain. By attempting to make it feel better, may have moved or stretched something in her back in response.    Patient received ketorolac, fentanyl x 2 with some improvement in right upper quadrant pain.  Continued to have tenderness in the muscles.  Discussed risks and benefits of a trigger point injection.  She agreed to trigger point injection.  This provided some relief.    Discharged home.  Referral to general surgery to discuss potential cholecystectomy.  Low-fat diet.  Provided oxycodone to utilize as needed if pain flares.  If it will not resolve or returns repeatedly, should be seen again in the ED.      Discharge Medication List as of 12/24/2024 11:37 AM        START taking these medications    Details   oxyCODONE (ROXICODONE) 5 MG tablet Take 1 tablet (5 mg) by mouth every 6 hours as needed for moderate to severe pain., Disp-12 tablet, R-0, E-Prescribe             Final diagnoses:    Gallstones   Spasm of thoracic back muscle       12/24/2024   [facility]       Tristin Briscoe MD  12/24/24 7040

## 2024-12-26 LAB
ATRIAL RATE - MUSE: 79 BPM
DIASTOLIC BLOOD PRESSURE - MUSE: NORMAL MMHG
INTERPRETATION ECG - MUSE: NORMAL
P AXIS - MUSE: 24 DEGREES
PR INTERVAL - MUSE: 146 MS
QRS DURATION - MUSE: 88 MS
QT - MUSE: 372 MS
QTC - MUSE: 426 MS
R AXIS - MUSE: 50 DEGREES
SYSTOLIC BLOOD PRESSURE - MUSE: NORMAL MMHG
T AXIS - MUSE: 43 DEGREES
VENTRICULAR RATE- MUSE: 79 BPM

## 2025-01-15 ENCOUNTER — OFFICE VISIT (OUTPATIENT)
Dept: SURGERY | Facility: OTHER | Age: 35
End: 2025-01-15
Attending: SURGERY
Payer: COMMERCIAL

## 2025-01-15 VITALS
SYSTOLIC BLOOD PRESSURE: 125 MMHG | TEMPERATURE: 98.6 F | WEIGHT: 248 LBS | RESPIRATION RATE: 16 BRPM | DIASTOLIC BLOOD PRESSURE: 80 MMHG | OXYGEN SATURATION: 100 % | HEART RATE: 83 BPM | HEIGHT: 70 IN | BODY MASS INDEX: 35.5 KG/M2

## 2025-01-15 DIAGNOSIS — K80.10 CHRONIC CHOLECYSTITIS WITH CALCULUS: Primary | ICD-10-CM

## 2025-01-15 PROBLEM — O09.291: Status: RESOLVED | Noted: 2017-06-20 | Resolved: 2025-01-15

## 2025-01-15 PROBLEM — Z87.59 HISTORY OF TWIN PREGNANCY IN PRIOR PREGNANCY: Status: RESOLVED | Noted: 2017-06-20 | Resolved: 2025-01-15

## 2025-01-15 PROCEDURE — G0463 HOSPITAL OUTPT CLINIC VISIT: HCPCS

## 2025-01-15 RX ORDER — CEFAZOLIN SODIUM 2 G/100ML
2 INJECTION, SOLUTION INTRAVENOUS SEE ADMIN INSTRUCTIONS
OUTPATIENT
Start: 2025-01-15

## 2025-01-15 RX ORDER — ACETAMINOPHEN 325 MG/1
975 TABLET ORAL ONCE
OUTPATIENT
Start: 2025-01-15 | End: 2025-01-15

## 2025-01-15 RX ORDER — CEFAZOLIN SODIUM 2 G/100ML
2 INJECTION, SOLUTION INTRAVENOUS
OUTPATIENT
Start: 2025-01-15

## 2025-01-15 ASSESSMENT — PAIN SCALES - GENERAL: PAINLEVEL_OUTOF10: NO PAIN (0)

## 2025-01-15 NOTE — PROGRESS NOTES
Surgical Clinic Consult  Referring physician:  KINSEY Briscoe  Primary physician:     Israel Ambriz    Chief complaint:   Gallstones    History of present illness:  This is a 34 year old female I am seeing in consultation for gallstones.  Patient has had several attacks of right upper quadrant abdominal pain that radiates to the back.  Most severe was on [place] when she came to the emergency room.  CBC, liver panel were unremarkable.  No history of jaundice dark urine or light-colored stools.  Ultrasound confirmed multiple stones, no wall thickening and a normal-sized common duct.  Has been having attacks about weekly.    Past medical history:   Past Medical History:   Diagnosis Date    Cholestasis during pregnancy     History of twin pregnancy in prior pregnancy 06/20/2017    Personal history of other medical treatment (CODE)     No Comments Provided       Pastsurgical history:  Past Surgical History:   Procedure Laterality Date    HYSTEROSCOPY DIAGNOSTIC N/A 1/5/2022    Procedure: Hysteroscopy with Dilation and Currettage, with myosure;  Surgeon: Bobby Leung MD;  Location:  OR    OTHER SURGICAL HISTORY      [id number],OTHER,age 4       Current medications:  Current Outpatient Medications   Medication Sig Dispense Refill    EPINEPHrine (ANY BX GENERIC EQUIV) 0.3 MG/0.3ML injection 2-pack Inject 0.3 mLs (0.3 mg) into the muscle once as needed for anaphylaxis. 0.6 mL 11       Allergies:  Allergies   Allergen Reactions    Aspirin Anaphylaxis and Hives     Other reaction(s): Angioedema  Swelling of throat and nausea  Other reaction(s): Wheezing    Doxycycline Anaphylaxis and GI Disturbance    Wasp Venom Protein Anaphylaxis    Clindamycin      Other reaction(s): Constipation  Nausea and Vomiting.       Family history:  No family history on file.    Social history:  Social History     Socioeconomic History    Marital status: Legally      Spouse name: Not on file    Number of children: Not on file    Years of  education: Not on file    Highest education level: Not on file   Occupational History    Not on file   Tobacco Use    Smoking status: Never    Smokeless tobacco: Never   Vaping Use    Vaping status: Some Days    Substances: Nicotine   Substance and Sexual Activity    Alcohol use: Yes     Alcohol/week: 0.0 standard drinks of alcohol     Comment: Alcoholic Drinks/day: rarely    Drug use: No    Sexual activity: Yes     Partners: Male     Birth control/protection: None   Other Topics Concern    Not on file   Social History Narrative    Works at [employer]       Social Drivers of Health     Financial Resource Strain: Not on file   Food Insecurity: Not on file   Transportation Needs: Not on file   Physical Activity: Not on file   Stress: Not on file   Social Connections: Not on file   Interpersonal Safety: Low Risk  (1/15/2025)    Interpersonal Safety     Do you feel physically and emotionally safe where you currently live?: Yes     Within the past 12 months, have you been hit, slapped, kicked or otherwise physically hurt by someone?: No     Within the past 12 months, have you been humiliated or emotionally abused in other ways by your partner or ex-partner?: No   Housing Stability: Not on file       PROBLEM LIST:  Patient Active Problem List   Diagnosis    Attention deficit disorder    Bee sting allergy    Bipolar I disorder (H)    Gall stones    Herpes labialis    Myofascial muscle pain    Spasm of paraspinal muscle    Trigger point of thoracic region    Non morbid obesity due to excess calories    Asthma    Chronic cholecystitis with calculus       Review of Systems:  COMPLETE REVIEW OF SYSTEMS: Denies problems  Gastrointestinal: See HPI  Cardiovascular: Denies problems  Respiratory: Denies problems  Genitourinary: Denies problems  Musculoskeletal: Denies problems  Skin: Denies problems  Neurologic: Denies problems  Psychiatric: Denies problems  Endocrine: Denies problems  Heme/Lymphatic: Denies  "problems  Vascular: Denies problems    Physical exam: /80 (BP Location: Right arm, Patient Position: Sitting, Cuff Size: Adult Large)   Pulse 83   Temp 98.6  F (37  C) (Tympanic)   Resp 16   Ht 1.765 m (5' 9.5\")   Wt 112.5 kg (248 lb)   LMP 12/25/2024 (Exact Date)   SpO2 100%   BMI 36.10 kg/m      General: this is a pleasant female patient in no acute distress.  Patient is awake alert and oriented x3 .  Poor dentition.  EXAM:  Chest/Respiratory Exam: Normal - Clear to auscultation without rales, rhonchi, or wheezing.  Cardiovascular Exam: regular rate and rhythm  Abdomen: No surgical scars.  Soft and nontender .    Assessment:   Chronic cholecystitis/cholelithiasis    Plan:    Laparoscopic cholecystectomy as a day surgery.  Risks of bleeding, infection, potential injury to bowel or bile duct, possible open procedure, possible bile leak discussed and wishes to proceed.      Jesse Mcmillan MD        "

## 2025-01-15 NOTE — NURSING NOTE
"Chief Complaint   Patient presents with    Consult     Gallstones       Initial /80 (BP Location: Right arm, Patient Position: Sitting, Cuff Size: Adult Large)   Pulse 83   Temp 98.6  F (37  C) (Tympanic)   Resp 16   Ht 1.765 m (5' 9.5\")   Wt 112.5 kg (248 lb)   LMP 12/25/2024 (Exact Date)   SpO2 100%   BMI 36.10 kg/m   Estimated body mass index is 36.1 kg/m  as calculated from the following:    Height as of this encounter: 1.765 m (5' 9.5\").    Weight as of this encounter: 112.5 kg (248 lb).  Meds Reconciled: complete      FOOD SECURITY SCREENING QUESTIONS  Hunger Vital Signs:  Within the past 12 months we worried whether our food would run out before we got money to buy more. Never  Within the past 12 months the food we bought just didn't last and we didn't have money to get more. Never  Gabby Uriarte RN 1/15/2025 2:52 PM        Gabby Uriarte RN     "

## 2025-01-15 NOTE — H&P (VIEW-ONLY)
Surgical Clinic Consult  Referring physician:  KINSEY Briscoe  Primary physician:     Israel Ambriz    Chief complaint:   Gallstones    History of present illness:  This is a 34 year old female I am seeing in consultation for gallstones.  Patient has had several attacks of right upper quadrant abdominal pain that radiates to the back.  Most severe was on [place] Kellee when she came to the emergency room.  CBC, liver panel were unremarkable.  No history of jaundice dark urine or light-colored stools.  Ultrasound confirmed multiple stones, no wall thickening and a normal-sized common duct.  Has been having attacks about weekly.    Past medical history:   Past Medical History:   Diagnosis Date    Cholestasis during pregnancy     History of twin pregnancy in prior pregnancy 06/20/2017    Personal history of other medical treatment (CODE)     No Comments Provided       Pastsurgical history:  Past Surgical History:   Procedure Laterality Date    HYSTEROSCOPY DIAGNOSTIC N/A 1/5/2022    Procedure: Hysteroscopy with Dilation and Currettage, with myosure;  Surgeon: Bobby Leung MD;  Location:  OR    OTHER SURGICAL HISTORY      [id number],OTHER,age 4       Current medications:  Current Outpatient Medications   Medication Sig Dispense Refill    EPINEPHrine (ANY BX GENERIC EQUIV) 0.3 MG/0.3ML injection 2-pack Inject 0.3 mLs (0.3 mg) into the muscle once as needed for anaphylaxis. 0.6 mL 11       Allergies:  Allergies   Allergen Reactions    Aspirin Anaphylaxis and Hives     Other reaction(s): Angioedema  Swelling of throat and nausea  Other reaction(s): Wheezing    Doxycycline Anaphylaxis and GI Disturbance    Wasp Venom Protein Anaphylaxis    Clindamycin      Other reaction(s): Constipation  Nausea and Vomiting.       Family history:  No family history on file.    Social history:  Social History     Socioeconomic History    Marital status: Legally      Spouse name: Not on file    Number of children: Not on file    Years of  education: Not on file    Highest education level: Not on file   Occupational History    Not on file   Tobacco Use    Smoking status: Never    Smokeless tobacco: Never   Vaping Use    Vaping status: Some Days    Substances: Nicotine   Substance and Sexual Activity    Alcohol use: Yes     Alcohol/week: 0.0 standard drinks of alcohol     Comment: Alcoholic Drinks/day: rarely    Drug use: No    Sexual activity: Yes     Partners: Male     Birth control/protection: None   Other Topics Concern    Not on file   Social History Narrative    Works at [employer]       Social Drivers of Health     Financial Resource Strain: Not on file   Food Insecurity: Not on file   Transportation Needs: Not on file   Physical Activity: Not on file   Stress: Not on file   Social Connections: Not on file   Interpersonal Safety: Low Risk  (1/15/2025)    Interpersonal Safety     Do you feel physically and emotionally safe where you currently live?: Yes     Within the past 12 months, have you been hit, slapped, kicked or otherwise physically hurt by someone?: No     Within the past 12 months, have you been humiliated or emotionally abused in other ways by your partner or ex-partner?: No   Housing Stability: Not on file       PROBLEM LIST:  Patient Active Problem List   Diagnosis    Attention deficit disorder    Bee sting allergy    Bipolar I disorder (H)    Gall stones    Herpes labialis    Myofascial muscle pain    Spasm of paraspinal muscle    Trigger point of thoracic region    Non morbid obesity due to excess calories    Asthma    Chronic cholecystitis with calculus       Review of Systems:  COMPLETE REVIEW OF SYSTEMS: Denies problems  Gastrointestinal: See HPI  Cardiovascular: Denies problems  Respiratory: Denies problems  Genitourinary: Denies problems  Musculoskeletal: Denies problems  Skin: Denies problems  Neurologic: Denies problems  Psychiatric: Denies problems  Endocrine: Denies problems  Heme/Lymphatic: Denies  "problems  Vascular: Denies problems    Physical exam: /80 (BP Location: Right arm, Patient Position: Sitting, Cuff Size: Adult Large)   Pulse 83   Temp 98.6  F (37  C) (Tympanic)   Resp 16   Ht 1.765 m (5' 9.5\")   Wt 112.5 kg (248 lb)   LMP 12/25/2024 (Exact Date)   SpO2 100%   BMI 36.10 kg/m      General: this is a pleasant female patient in no acute distress.  Patient is awake alert and oriented x3 .  Poor dentition.  EXAM:  Chest/Respiratory Exam: Normal - Clear to auscultation without rales, rhonchi, or wheezing.  Cardiovascular Exam: regular rate and rhythm  Abdomen: No surgical scars.  Soft and nontender .    Assessment:   Chronic cholecystitis/cholelithiasis    Plan:    Laparoscopic cholecystectomy as a day surgery.  Risks of bleeding, infection, potential injury to bowel or bile duct, possible open procedure, possible bile leak discussed and wishes to proceed.      Jesse Mcmillan MD        "

## 2025-02-07 ENCOUNTER — HOSPITAL ENCOUNTER (OUTPATIENT)
Facility: OTHER | Age: 35
Discharge: HOME | End: 2025-02-07
Attending: SURGERY | Admitting: SURGERY
Payer: COMMERCIAL

## 2025-02-07 VITALS
HEIGHT: 69 IN | DIASTOLIC BLOOD PRESSURE: 83 MMHG | WEIGHT: 248 LBS | TEMPERATURE: 98 F | RESPIRATION RATE: 14 BRPM | OXYGEN SATURATION: 98 % | HEART RATE: 89 BPM | SYSTOLIC BLOOD PRESSURE: 134 MMHG | BODY MASS INDEX: 36.73 KG/M2

## 2025-02-07 DIAGNOSIS — K80.10 CHRONIC CHOLECYSTITIS WITH CALCULUS: Primary | ICD-10-CM

## 2025-02-07 PROCEDURE — 258N000003 HC RX IP 258 OP 636: Performed by: NURSE ANESTHETIST, CERTIFIED REGISTERED

## 2025-02-07 PROCEDURE — 272N000001 HC OR GENERAL SUPPLY STERILE: Performed by: SURGERY

## 2025-02-07 PROCEDURE — 47562 LAPAROSCOPIC CHOLECYSTECTOMY: CPT | Performed by: SURGERY

## 2025-02-07 PROCEDURE — 999N000141 HC STATISTIC PRE-PROCEDURE NURSING ASSESSMENT: Performed by: SURGERY

## 2025-02-07 PROCEDURE — 250N000013 HC RX MED GY IP 250 OP 250 PS 637: Performed by: NURSE ANESTHETIST, CERTIFIED REGISTERED

## 2025-02-07 PROCEDURE — 250N000025 HC SEVOFLURANE, PER MIN: Performed by: SURGERY

## 2025-02-07 PROCEDURE — 250N000011 HC RX IP 250 OP 636: Performed by: NURSE ANESTHETIST, CERTIFIED REGISTERED

## 2025-02-07 PROCEDURE — 710N000012 HC RECOVERY PHASE 2, PER MINUTE: Performed by: SURGERY

## 2025-02-07 PROCEDURE — 710N000010 HC RECOVERY PHASE 1, LEVEL 2, PER MIN: Performed by: SURGERY

## 2025-02-07 PROCEDURE — 370N000017 HC ANESTHESIA TECHNICAL FEE, PER MIN: Performed by: SURGERY

## 2025-02-07 PROCEDURE — 250N000011 HC RX IP 250 OP 636: Performed by: SURGERY

## 2025-02-07 PROCEDURE — 258N000001 HC RX 258: Performed by: SURGERY

## 2025-02-07 PROCEDURE — 250N000013 HC RX MED GY IP 250 OP 250 PS 637: Performed by: SURGERY

## 2025-02-07 PROCEDURE — 360N000076 HC SURGERY LEVEL 3, PER MIN: Performed by: SURGERY

## 2025-02-07 RX ORDER — ONDANSETRON 2 MG/ML
4 INJECTION INTRAMUSCULAR; INTRAVENOUS EVERY 30 MIN PRN
Status: DISCONTINUED | OUTPATIENT
Start: 2025-02-07 | End: 2025-02-07 | Stop reason: HOSPADM

## 2025-02-07 RX ORDER — ONDANSETRON 4 MG/1
4 TABLET, ORALLY DISINTEGRATING ORAL EVERY 30 MIN PRN
Status: DISCONTINUED | OUTPATIENT
Start: 2025-02-07 | End: 2025-02-07 | Stop reason: HOSPADM

## 2025-02-07 RX ORDER — OXYCODONE HYDROCHLORIDE 5 MG/1
10 TABLET ORAL
Status: DISCONTINUED | OUTPATIENT
Start: 2025-02-07 | End: 2025-02-07 | Stop reason: HOSPADM

## 2025-02-07 RX ORDER — OXYCODONE HYDROCHLORIDE 5 MG/1
5 TABLET ORAL
Status: DISCONTINUED | OUTPATIENT
Start: 2025-02-07 | End: 2025-02-07 | Stop reason: HOSPADM

## 2025-02-07 RX ORDER — SODIUM CHLORIDE, SODIUM LACTATE, POTASSIUM CHLORIDE, CALCIUM CHLORIDE 600; 310; 30; 20 MG/100ML; MG/100ML; MG/100ML; MG/100ML
INJECTION, SOLUTION INTRAVENOUS CONTINUOUS
Status: DISCONTINUED | OUTPATIENT
Start: 2025-02-07 | End: 2025-02-07 | Stop reason: HOSPADM

## 2025-02-07 RX ORDER — OXYCODONE HYDROCHLORIDE 5 MG/1
5 TABLET ORAL
Status: COMPLETED | OUTPATIENT
Start: 2025-02-07 | End: 2025-02-07

## 2025-02-07 RX ORDER — ACETAMINOPHEN 325 MG/1
975 TABLET ORAL ONCE
Status: COMPLETED | OUTPATIENT
Start: 2025-02-07 | End: 2025-02-07

## 2025-02-07 RX ORDER — CEFAZOLIN SODIUM/WATER 2 G/20 ML
2 SYRINGE (ML) INTRAVENOUS SEE ADMIN INSTRUCTIONS
Status: DISCONTINUED | OUTPATIENT
Start: 2025-02-07 | End: 2025-02-07 | Stop reason: HOSPADM

## 2025-02-07 RX ORDER — BUPIVACAINE HYDROCHLORIDE 2.5 MG/ML
INJECTION, SOLUTION INFILTRATION; PERINEURAL PRN
Status: DISCONTINUED | OUTPATIENT
Start: 2025-02-07 | End: 2025-02-07 | Stop reason: HOSPADM

## 2025-02-07 RX ORDER — HYDROMORPHONE HCL IN WATER/PF 6 MG/30 ML
0.2 PATIENT CONTROLLED ANALGESIA SYRINGE INTRAVENOUS EVERY 5 MIN PRN
Status: DISCONTINUED | OUTPATIENT
Start: 2025-02-07 | End: 2025-02-07 | Stop reason: HOSPADM

## 2025-02-07 RX ORDER — DEXAMETHASONE SODIUM PHOSPHATE 10 MG/ML
4 INJECTION, SOLUTION INTRAMUSCULAR; INTRAVENOUS
Status: DISCONTINUED | OUTPATIENT
Start: 2025-02-07 | End: 2025-02-07 | Stop reason: HOSPADM

## 2025-02-07 RX ORDER — NALOXONE HYDROCHLORIDE 0.4 MG/ML
0.1 INJECTION, SOLUTION INTRAMUSCULAR; INTRAVENOUS; SUBCUTANEOUS
Status: DISCONTINUED | OUTPATIENT
Start: 2025-02-07 | End: 2025-02-07 | Stop reason: HOSPADM

## 2025-02-07 RX ORDER — FENTANYL CITRATE 50 UG/ML
50 INJECTION, SOLUTION INTRAMUSCULAR; INTRAVENOUS EVERY 5 MIN PRN
Status: DISCONTINUED | OUTPATIENT
Start: 2025-02-07 | End: 2025-02-07 | Stop reason: HOSPADM

## 2025-02-07 RX ORDER — FENTANYL CITRATE 50 UG/ML
50 INJECTION, SOLUTION INTRAMUSCULAR; INTRAVENOUS
Status: DISCONTINUED | OUTPATIENT
Start: 2025-02-07 | End: 2025-02-07 | Stop reason: HOSPADM

## 2025-02-07 RX ORDER — CEFAZOLIN SODIUM/WATER 2 G/20 ML
2 SYRINGE (ML) INTRAVENOUS
Status: DISCONTINUED | OUTPATIENT
Start: 2025-02-07 | End: 2025-02-07 | Stop reason: HOSPADM

## 2025-02-07 RX ORDER — OXYCODONE AND ACETAMINOPHEN 5; 325 MG/1; MG/1
1 TABLET ORAL EVERY 6 HOURS PRN
Qty: 12 TABLET | Refills: 0 | Status: SHIPPED | OUTPATIENT
Start: 2025-02-07 | End: 2025-02-10

## 2025-02-07 RX ORDER — FENTANYL CITRATE 50 UG/ML
25 INJECTION, SOLUTION INTRAMUSCULAR; INTRAVENOUS EVERY 5 MIN PRN
Status: DISCONTINUED | OUTPATIENT
Start: 2025-02-07 | End: 2025-02-07 | Stop reason: HOSPADM

## 2025-02-07 RX ORDER — HYDROMORPHONE HCL IN WATER/PF 6 MG/30 ML
0.4 PATIENT CONTROLLED ANALGESIA SYRINGE INTRAVENOUS EVERY 5 MIN PRN
Status: DISCONTINUED | OUTPATIENT
Start: 2025-02-07 | End: 2025-02-07 | Stop reason: HOSPADM

## 2025-02-07 RX ORDER — ACETAMINOPHEN 325 MG/1
650 TABLET ORAL 4 TIMES DAILY
Qty: 24 TABLET | Refills: 0 | Status: SHIPPED | OUTPATIENT
Start: 2025-02-07 | End: 2025-02-10

## 2025-02-07 RX ORDER — LIDOCAINE 40 MG/G
CREAM TOPICAL
Status: DISCONTINUED | OUTPATIENT
Start: 2025-02-07 | End: 2025-02-07 | Stop reason: HOSPADM

## 2025-02-07 RX ADMIN — SODIUM CHLORIDE, POTASSIUM CHLORIDE, SODIUM LACTATE AND CALCIUM CHLORIDE: 600; 310; 30; 20 INJECTION, SOLUTION INTRAVENOUS at 10:30

## 2025-02-07 RX ADMIN — ONDANSETRON 4 MG: 2 INJECTION INTRAMUSCULAR; INTRAVENOUS at 12:30

## 2025-02-07 RX ADMIN — ACETAMINOPHEN 975 MG: 325 TABLET, FILM COATED ORAL at 10:15

## 2025-02-07 RX ADMIN — SODIUM CHLORIDE, POTASSIUM CHLORIDE, SODIUM LACTATE AND CALCIUM CHLORIDE: 600; 310; 30; 20 INJECTION, SOLUTION INTRAVENOUS at 12:31

## 2025-02-07 RX ADMIN — FENTANYL CITRATE 50 MCG: 50 INJECTION INTRAMUSCULAR; INTRAVENOUS at 12:29

## 2025-02-07 RX ADMIN — OXYCODONE HYDROCHLORIDE 5 MG: 5 TABLET ORAL at 13:51

## 2025-02-07 RX ADMIN — Medication 2 G: at 10:40

## 2025-02-07 ASSESSMENT — ACTIVITIES OF DAILY LIVING (ADL)
ADLS_ACUITY_SCORE: 35

## 2025-02-07 NOTE — OP NOTE
PREOPERATIVE  DIAGNOSIS:  Chronic cholecystitis/cholelithiasis.       POSTOPERATIVE DIAGNOSIS:  Chronic cholecystitis/cholelithiasis.      PROCEDURE PERFORMED:  Laparoscopic cholecystectomy.    SURGEON:  Jesse Mcmillan MD [facility]    ASSISTANT: CHAITANYA Perdomo RN    ANESTHESIA:  General, MACHO Blanton CRNA    FAMILY PHYSICIAN: Israel Ambriz        INDICATION FOR THE PROCEDURE:  The patient is a 34 year old female with gallstones.  Patient has had several attacks of right upper quadrant abdominal pain that radiates to the back.  Most severe was on [place] Kellee when she came to the emergency room.  CBC, liver panel were unremarkable.  No history of jaundice dark urine or light-colored stools.  Ultrasound confirmed multiple stones, no wall thickening and a normal-sized common duct.  Has been having attacks about weekly.      PROCEDURE:  After adequate general anesthesia, the patient was prepped and draped in the usual sterile fashion.  A supraumbilical 5 mm incision was made and the abdomen entered using the Visiport technique.  The abdomen was insufflated with carbon dioxide to a pressure of 15 mmHg.  Under direct vision, an 12 mm epigastric and two 5 mm right-sided ports were placed.  The initial port site was inspected and was unremarkable.   The gallbladder was then grasped and held on traction.    Dissection was carried down to  Parviz s pouch .  The cystic duct was then able to be visualized.  That was dissected free circumferentially, triply clipped and divided.  Adjacent to that was the cystic artery and that was dissected free circumferentially, doubly clipped and divided.  The gallbladder was then taken out of the hepatic bed using the hook cautery and maintaining good hemostasis throughout.  There was no spillage of stones or bile throughout.The gallbladder was placed in an plastic pouch and removed through the epigastric port site with dilation.  There was no spillage.  The right upper quadrant was irrigated with a liter  of normal saline.  There was good hemostasis.   The three 5 mm ports were removed under direct vision.  There was good hemostasis.   The abdomen was deflated and the epigastric port removed.  The epigastric fascial defect was closed with an 0-Vicryl suture.  The wound was infiltrated with 0.25% Marcaine, the dermis approximated with 3-0 Vicryl sutures and the skin closed with 4-0 Monocryl intracuticular suture.  Proxi-Strips and clean, dry dressings were applied.  The patient was taken to the recovery room in stable condition.      Jesse Mcmillna MD FACS

## 2025-02-07 NOTE — OR NURSING
PACU Transfer Note    Alexy Love was transferred to [facility] via cart.  Equipment used for transport:  none.  Accompanied by:  Sindy HUNTER  Prescriptions were: escribed    PACU Respiratory Event Documentation     1) Episodes of Apnea greater than or equal to 10 seconds: 0    2) Bradypnea - less than 8 breaths per minute: 0    3) Pain score on 0 to 10 scale: 6    4) Pain-sedation mismatch (yes or no): no    5) Repeated 02 desaturation less than 90% (yes or no): no    Anesthesia notified? (yes or no): no    Any of the above events occuring repeatedly in separate 30 minute intervals may be considered recurrent PACU respiratory events.    Patient stable and meets phase 1 discharge criteria for transport from PACU.

## 2025-02-07 NOTE — OR NURSING
Pt has been discharged to home at 1450 via ambulatory accompanied by daughter.    Written discharge instructions were provided to pt and family.  Prescriptions were escribed to pharmacy.  Patient states their pain is 5/10 tolerable, and denies any nausea or dizziness upon discharge.    Patient verbalize understanding of discharge instructions including no driving until tomorrow and no longer taking narcotic pain medications, no operating mechanical equipment, and no making any important decisions.They understand reason for discharge, and necessary follow-up appointments.  Lynsey Grider RN on 2/7/2025 at 2:54 PM

## 2025-02-07 NOTE — DISCHARGE INSTRUCTIONS
[facility]  Adult Discharge Orders & Instructions      For 24 hours after surgery:  Get plenty of rest.  A responsible adult must stay with you for at least 24 hours after you leave the hospital.   You may feel lightheaded.  IF so, sit for a few minutes before standing.  Have someone help you get up.   You may have a slight fever. Call the doctor if your fever is over 101 F (38.3 C) (taken under the tongue) or lasts longer than 24 hours.  You may have a dry mouth, a sore throat, muscle aches or trouble sleeping.  These should go away after 24 hours.  Do not make important or legal decisions.  6.   Do not drive or use heavy equipment.  If you have weakness or tingling, don't drive or use heavy equipment until this feeling goes away.                                                                                                                                                                           To contact a doctor, call    [phone]

## 2025-02-07 NOTE — INTERVAL H&P NOTE
The history and physical has been reviewed and the patient has been examined.  There are no interim changes to the patient's history or physical condition.    Jesse Mcmillan MD

## 2025-02-11 LAB
PATH REPORT.COMMENTS IMP SPEC: NORMAL
PATH REPORT.FINAL DX SPEC: NORMAL
PATH REPORT.RELEVANT HX SPEC: NORMAL
PHOTO IMAGE: NORMAL

## 2025-05-08 ASSESSMENT — ASTHMA QUESTIONNAIRES
ACT_TOTALSCORE: 20
QUESTION_1 LAST FOUR WEEKS HOW MUCH OF THE TIME DID YOUR ASTHMA KEEP YOU FROM GETTING AS MUCH DONE AT WORK, SCHOOL OR AT HOME: A LITTLE OF THE TIME
QUESTION_2 LAST FOUR WEEKS HOW OFTEN HAVE YOU HAD SHORTNESS OF BREATH: ONCE A DAY
QUESTION_4 LAST FOUR WEEKS HOW OFTEN HAVE YOU USED YOUR RESCUE INHALER OR NEBULIZER MEDICATION (SUCH AS ALBUTEROL): NOT AT ALL
QUESTION_5 LAST FOUR WEEKS HOW WOULD YOU RATE YOUR ASTHMA CONTROL: WELL CONTROLLED
QUESTION_3 LAST FOUR WEEKS HOW OFTEN DID YOUR ASTHMA SYMPTOMS (WHEEZING, COUGHING, SHORTNESS OF BREATH, CHEST TIGHTNESS OR PAIN) WAKE YOU UP AT NIGHT OR EARLIER THAN USUAL IN THE MORNING: NOT AT ALL

## 2025-05-09 ENCOUNTER — OFFICE VISIT (OUTPATIENT)
Dept: FAMILY MEDICINE | Facility: OTHER | Age: 35
End: 2025-05-09
Attending: NURSE PRACTITIONER
Payer: COMMERCIAL

## 2025-05-09 ENCOUNTER — RESULTS FOLLOW-UP (OUTPATIENT)
Dept: FAMILY MEDICINE | Facility: OTHER | Age: 35
End: 2025-05-09

## 2025-05-09 VITALS
DIASTOLIC BLOOD PRESSURE: 84 MMHG | BODY MASS INDEX: 35.87 KG/M2 | SYSTOLIC BLOOD PRESSURE: 120 MMHG | HEIGHT: 69 IN | WEIGHT: 242.2 LBS | TEMPERATURE: 98.3 F | RESPIRATION RATE: 16 BRPM | OXYGEN SATURATION: 99 % | HEART RATE: 76 BPM

## 2025-05-09 DIAGNOSIS — Z13.29 SCREENING FOR THYROID DISORDER: ICD-10-CM

## 2025-05-09 DIAGNOSIS — R51.9 ACUTE NONINTRACTABLE HEADACHE, UNSPECIFIED HEADACHE TYPE: ICD-10-CM

## 2025-05-09 DIAGNOSIS — N92.6 MISSED PERIOD: Primary | ICD-10-CM

## 2025-05-09 LAB
HCG INTACT+B SERPL-ACNC: <1 MIU/ML
HCG UR QL: NEGATIVE
TSH SERPL DL<=0.005 MIU/L-ACNC: 0.86 UIU/ML (ref 0.3–4.2)

## 2025-05-09 PROCEDURE — 96372 THER/PROPH/DIAG INJ SC/IM: CPT | Performed by: NURSE PRACTITIONER

## 2025-05-09 PROCEDURE — 84443 ASSAY THYROID STIM HORMONE: CPT | Mod: ZL | Performed by: NURSE PRACTITIONER

## 2025-05-09 PROCEDURE — 84702 CHORIONIC GONADOTROPIN TEST: CPT | Mod: ZL | Performed by: NURSE PRACTITIONER

## 2025-05-09 PROCEDURE — G0463 HOSPITAL OUTPT CLINIC VISIT: HCPCS

## 2025-05-09 PROCEDURE — 250N000011 HC RX IP 250 OP 636: Mod: JZ | Performed by: NURSE PRACTITIONER

## 2025-05-09 PROCEDURE — 81025 URINE PREGNANCY TEST: CPT | Mod: ZL | Performed by: NURSE PRACTITIONER

## 2025-05-09 PROCEDURE — 36415 COLL VENOUS BLD VENIPUNCTURE: CPT | Mod: ZL | Performed by: NURSE PRACTITIONER

## 2025-05-09 RX ORDER — KETOROLAC TROMETHAMINE 30 MG/ML
60 INJECTION, SOLUTION INTRAMUSCULAR; INTRAVENOUS ONCE
Status: COMPLETED | OUTPATIENT
Start: 2025-05-09 | End: 2025-05-09

## 2025-05-09 RX ADMIN — KETOROLAC TROMETHAMINE 60 MG: 30 INJECTION, SOLUTION INTRAMUSCULAR at 09:30

## 2025-05-09 ASSESSMENT — PAIN SCALES - GENERAL: PAINLEVEL_OUTOF10: NO PAIN (0)

## 2025-05-09 NOTE — NURSING NOTE
Patient presents today for irregular periods. Patient was trying to get pregnant but is not.    Medication Reconciliation Complete    Carisa Allan LPN  5/9/2025 9:13 AM

## 2025-05-09 NOTE — PROGRESS NOTES
"  Assessment & Plan   Problem List Items Addressed This Visit    None  Visit Diagnoses         Missed period    -  Primary    Relevant Orders    HCG quantitative pregnancy (Completed)    Pregnancy, Urine (HCG) (Completed)    US Pelvic Complete with Transvaginal      Screening for thyroid disorder        Relevant Orders    TSH Reflex GH (Completed)      Acute nonintractable headache, unspecified headache type        Relevant Medications    ketorolac (TORADOL) injection 60 mg (Completed)           Urine pregnancy and  serum hCG obtained  TSH negative  Ketorolac ordered for headache           Subjective   Alexy is a 34 year old, presenting for the following health issues:  Abnormal Bleeding Problem    Abnormal Bleeding Problem    History of Present Illness       Reason for visit:  Missed period testing negative  Symptom onset:  More than a month  Symptoms include:  Missed period  Symptom intensity:  Mild  Symptom progression:  Staying the same  Had these symptoms before:  Yes  Has tried/received treatment for these symptoms:  No   She is taking medications regularly.        She presents to clinic today because she has missed her.,  Has done several home pregnancy tests but feels like she did when she was pregnant previous.  She has also been having headaches that she cannot get under control.      Objective    /84   Pulse 76   Temp 98.3  F (36.8  C)   Resp 16   Ht 1.753 m (5' 9\")   Wt 109.9 kg (242 lb 3.2 oz)   LMP 04/02/2025 (Exact Date)   SpO2 99%   BMI 35.77 kg/m    Body mass index is 35.77 kg/m .  Physical Exam   GENERAL: alert and no distress  EYES: Eyes grossly normal to inspection  RESP: lungs clear to auscultation - no rales, rhonchi or wheezes  CV: regular rate and rhythm, normal S1 S2  NEURO: Normal strength and tone, mentation intact and speech normal  PSYCH: mentation appears normal, affect normal/bright    Results for orders placed or performed in visit on 05/09/25   TSH Reflex GH     Status: " Normal   Result Value Ref Range    TSH 0.86 0.30 - 4.20 uIU/mL   HCG quantitative pregnancy     Status: Normal   Result Value Ref Range    hCG Quantitative <1 <5 mIU/mL   Pregnancy, Urine (HCG)     Status: Normal   Result Value Ref Range    hCG Urine Qualitative Negative Negative             Signed Electronically by: BRYANT Jones CNP

## 2025-05-28 ENCOUNTER — OFFICE VISIT (OUTPATIENT)
Dept: FAMILY MEDICINE | Facility: OTHER | Age: 35
End: 2025-05-28
Attending: PHYSICIAN ASSISTANT
Payer: COMMERCIAL

## 2025-05-28 VITALS
SYSTOLIC BLOOD PRESSURE: 128 MMHG | RESPIRATION RATE: 16 BRPM | OXYGEN SATURATION: 98 % | WEIGHT: 239.2 LBS | BODY MASS INDEX: 35.32 KG/M2 | TEMPERATURE: 99 F | HEART RATE: 92 BPM | DIASTOLIC BLOOD PRESSURE: 82 MMHG

## 2025-05-28 DIAGNOSIS — F99 MENTAL HEALTH DISORDER: Primary | ICD-10-CM

## 2025-05-28 DIAGNOSIS — G43.809 OTHER MIGRAINE WITHOUT STATUS MIGRAINOSUS, NOT INTRACTABLE: ICD-10-CM

## 2025-05-28 DIAGNOSIS — R45.4 EXCESSIVE ANGER: ICD-10-CM

## 2025-05-28 PROCEDURE — G0463 HOSPITAL OUTPT CLINIC VISIT: HCPCS

## 2025-05-28 RX ORDER — SUMATRIPTAN SUCCINATE 25 MG/1
25 TABLET ORAL
Qty: 30 TABLET | Refills: 3 | Status: SHIPPED | OUTPATIENT
Start: 2025-05-28 | End: 2025-05-28

## 2025-05-28 RX ORDER — SUMATRIPTAN SUCCINATE 25 MG/1
25 TABLET ORAL
Qty: 30 TABLET | Refills: 3 | Status: SHIPPED | OUTPATIENT
Start: 2025-05-28

## 2025-05-28 RX ORDER — SUMATRIPTAN SUCCINATE 25 MG/1
25 TABLET ORAL
Qty: 30 TABLET | Refills: 0 | Status: SHIPPED | OUTPATIENT
Start: 2025-05-28 | End: 2025-05-28

## 2025-05-28 ASSESSMENT — ANXIETY QUESTIONNAIRES
5. BEING SO RESTLESS THAT IT IS HARD TO SIT STILL: SEVERAL DAYS
7. FEELING AFRAID AS IF SOMETHING AWFUL MIGHT HAPPEN: SEVERAL DAYS
3. WORRYING TOO MUCH ABOUT DIFFERENT THINGS: SEVERAL DAYS
7. FEELING AFRAID AS IF SOMETHING AWFUL MIGHT HAPPEN: SEVERAL DAYS
1. FEELING NERVOUS, ANXIOUS, OR ON EDGE: SEVERAL DAYS
GAD7 TOTAL SCORE: 9
2. NOT BEING ABLE TO STOP OR CONTROL WORRYING: SEVERAL DAYS
4. TROUBLE RELAXING: NEARLY EVERY DAY
6. BECOMING EASILY ANNOYED OR IRRITABLE: SEVERAL DAYS
GAD7 TOTAL SCORE: 9
GAD7 TOTAL SCORE: 9

## 2025-05-28 ASSESSMENT — PAIN SCALES - GENERAL: PAINLEVEL_OUTOF10: SEVERE PAIN (7)

## 2025-05-28 NOTE — PROGRESS NOTES
Assessment & Plan     Mental health disorder  Excessive anger  Chronic with several years of difficulties with focus, attention, mood, anger management difficulties with significant anger outbursts.  Patient reporting prior diagnosis of ADHD in childhood, diagnosed with bipolar 1 disorder in teenage years.  Had been on Adderall, Strattera, Wellbutrin for ADHD control reporting Adderall worked best.  Has previously been on citalopram in 2018 for postpartum depression symptoms but reports that did not work well.  Has been on many other medications per patient report however these are not available in chart to review and she does not recall the names.  Due to use of multiple different medications in the past without full control we discussed considering establishing with mental health medication manager going forward.  Patient will schedule an establish care appointment with mental health medication manager through GI at her convenience.  Consider establishing with therapist as well.    Other migraine without status migrainosus, not intractable  Describe symptoms seem most consistent with tension headache that sometimes progresses into migrainous headache.  Continue to manage tension headaches with over-the-counter Tylenol, ibuprofen as needed.  Prescription for sumatriptan to be used for [place] management of migrainous symptoms.  Educated medication, use, side effects.  Follow-up as needed.  - SUMAtriptan (IMITREX) 25 MG tablet; Take 1 tablet (25 mg) by mouth at onset of headache for migraine. May repeat in 2 hours.        Ambrose Tracey is a 34 year old, presenting for the following health issues:  Anxiety    History of Present Illness       Mental Health Follow-up:  Patient presents to follow-up on Anxiety.    Patient's anxiety since last visit has been:  Worse  The patient is having other symptoms associated with anxiety.  Any significant life events: relationship concerns and financial concerns  Patient is  feeling anxious or having panic attacks.  Patient has no concerns about alcohol or drug use.    She eats 0-1 servings of fruits and vegetables daily.She consumes 4 sweetened beverage(s) daily.   She is taking medications regularly.      Here with partner for discussion regarding mental health concerns.  Patient reports she was diagnosed with ADHD during childhood.  Reports she had been on Adderall, Strattera, and Wellbutrin for management of ADHD symptoms.  Felt Adderall control in the past but has not been on any of this medication for quite some time.  Reports she was diagnosed with bipolar 1 disorder around age 15.  She knows she was on multiple medications for this but does not recall the names of the medications.  Unfortunately these records are also not available to review.  Per chart review she had been on citalopram for management of postpartum depression in 2018.  Patient reports she did not tolerate this medication well noticing limited improvement when she thinks she may have had side effects but not sure.  Currently she deals with continued difficulties with focus and attention but most bothersome she has been dealing with significant anxiety as well as anger management difficulties.  Reports she is often easily to anger and will struggle with significant outburst where she will throw and break things as well as cut up clothing that is her partners when he frustrates her.  This is putting a significant strain on their home life and marriage.  She had wanted to consider medication at this time.  Not currently working with mental health team or therapy.    Patient also reports she has been struggling with progressive headaches.  Deals with headaches that often start at the base of her right neck and extends forward.  Are typically right frontal headaches that are throbbing.  Sometimes will radiate to bilateral throbbing.  Associated photosensitivity.  No phonophobia.  No significant nausea or vomiting.  No  visual changes but is significantly sensitive to the LED lights at her work.  Refractory to over-the-counter medication.  Has noticed some migrainous type symptoms with these headaches that occurs often times during her anger outburst as well.      PAST MEDICAL HISTORY:   Past Medical History:   Diagnosis Date    Cholestasis during pregnancy     History of twin pregnancy in prior pregnancy 06/20/2017    Personal history of other medical treatment (CODE)     No Comments Provided       PAST SURGICAL HISTORY:   Past Surgical History:   Procedure Laterality Date    HYSTEROSCOPY DIAGNOSTIC N/A 1/5/2022    Procedure: Hysteroscopy with Dilation and Currettage, with myosure;  Surgeon: Bobby Leung MD;  Location:  OR    OTHER SURGICAL HISTORY      [id number],OTHER,age 4       FAMILY HISTORY: No family history on file.    SOCIAL HISTORY:   Social History     Tobacco Use    Smoking status: Never    Smokeless tobacco: Never   Substance Use Topics    Alcohol use: Yes     Alcohol/week: 0.0 standard drinks of alcohol     Comment: Alcoholic Drinks/day: rarely        Allergies   Allergen Reactions    Aspirin Anaphylaxis and Hives     Other reaction(s): Angioedema  Swelling of throat and nausea  Other reaction(s): Wheezing    Doxycycline Anaphylaxis and GI Disturbance    Wasp Venom Protein Anaphylaxis    Clindamycin      Other reaction(s): Constipation  Nausea and Vomiting.     Current Outpatient Medications   Medication Sig Dispense Refill    SUMAtriptan (IMITREX) 25 MG tablet Take 1 tablet (25 mg) by mouth at onset of headache for migraine. May repeat in 2 hours. 30 tablet 3    EPINEPHrine (ANY BX GENERIC EQUIV) 0.3 MG/0.3ML injection 2-pack Inject 0.3 mLs (0.3 mg) into the muscle once as needed for anaphylaxis. (Patient not taking: Reported on 5/28/2025) 0.6 mL 11     No current facility-administered medications for this visit.           Objective    /82   Pulse 92   Temp 99  F (37.2  C) (Tympanic)   Resp 16   Wt 108.5  kg (239 lb 3.2 oz)   LMP 04/02/2025 (Exact Date)   SpO2 98%   BMI 35.32 kg/m    Body mass index is 35.32 kg/m .  Physical Exam   General: Pleasant, in no apparent distress.  Eyes: Sclera are white and conjunctiva are clear bilaterally. Lacrimal apparatus free of erythema, edema, and discharge bilaterally.  Ears: External ears without erythema or edema.  Nose: External nose is symmetrical and free of lesions and deformities.  Skin: No jaundice, pallor, rashes, or lesions.  Psych: Appropriate mood and affect. Tearful throughout visit.       Signed Electronically by: Charmaine Almazan PA-C

## 2025-05-28 NOTE — NURSING NOTE
"Chief Complaint   Patient presents with    Anxiety       Initial /82   Pulse 92   Temp 99  F (37.2  C) (Tympanic)   Resp 16   Wt 108.5 kg (239 lb 3.2 oz)   LMP 04/02/2025 (Exact Date)   SpO2 98%   BMI 35.32 kg/m   Estimated body mass index is 35.32 kg/m  as calculated from the following:    Height as of 5/9/25: 1.753 m (5' 9\").    Weight as of this encounter: 108.5 kg (239 lb 3.2 oz).  Medication Review: complete    The next two questions are to help us understand your food security.  If you are feeling you need any assistance in this area, we have resources available to support you today.           No data to display                  Health Care Directive:  Patient does not have a Health Care Directive: Discussed advance care planning with patient; however, patient declined at this time.    Marilin Lucas LPN      "

## 2025-05-29 ENCOUNTER — OFFICE VISIT (OUTPATIENT)
Dept: PSYCHIATRY | Facility: OTHER | Age: 35
End: 2025-05-29
Attending: NURSE PRACTITIONER
Payer: COMMERCIAL

## 2025-05-29 VITALS
SYSTOLIC BLOOD PRESSURE: 138 MMHG | HEART RATE: 82 BPM | BODY MASS INDEX: 37.57 KG/M2 | WEIGHT: 239.4 LBS | RESPIRATION RATE: 20 BRPM | TEMPERATURE: 100.1 F | HEIGHT: 67 IN | DIASTOLIC BLOOD PRESSURE: 83 MMHG | OXYGEN SATURATION: 98 %

## 2025-05-29 DIAGNOSIS — F60.3 BORDERLINE PERSONALITY DISORDER (H): ICD-10-CM

## 2025-05-29 DIAGNOSIS — F33.1 MAJOR DEPRESSIVE DISORDER, RECURRENT EPISODE, MODERATE (H): Primary | ICD-10-CM

## 2025-05-29 DIAGNOSIS — F90.9 ATTENTION DEFICIT HYPERACTIVITY DISORDER (ADHD), UNSPECIFIED ADHD TYPE: ICD-10-CM

## 2025-05-29 PROCEDURE — G0463 HOSPITAL OUTPT CLINIC VISIT: HCPCS

## 2025-05-29 RX ORDER — LAMOTRIGINE 25 MG/1
TABLET ORAL
Qty: 42 TABLET | Refills: 0 | Status: SHIPPED | OUTPATIENT
Start: 2025-05-29

## 2025-05-29 RX ORDER — GABAPENTIN 100 MG/1
100 CAPSULE ORAL 3 TIMES DAILY
Qty: 90 CAPSULE | Refills: 1 | Status: SHIPPED | OUTPATIENT
Start: 2025-05-29

## 2025-05-29 ASSESSMENT — PAIN SCALES - GENERAL: PAINLEVEL_OUTOF10: NO PAIN (0)

## 2025-05-29 ASSESSMENT — PATIENT HEALTH QUESTIONNAIRE - PHQ9
SUM OF ALL RESPONSES TO PHQ QUESTIONS 1-9: 11
10. IF YOU CHECKED OFF ANY PROBLEMS, HOW DIFFICULT HAVE THESE PROBLEMS MADE IT FOR YOU TO DO YOUR WORK, TAKE CARE OF THINGS AT HOME, OR GET ALONG WITH OTHER PEOPLE: VERY DIFFICULT
SUM OF ALL RESPONSES TO PHQ QUESTIONS 1-9: 11

## 2025-05-29 NOTE — PATIENT INSTRUCTIONS
"Karra, it was a pleasure seeing you today. As we discussed, we are going to do the following to start:    Start Lamictal/Lamotrigine 25mg, 1 tab at bed x 14 nights, then increase to 50mg, 2 tabs at bed.     Start Gabapentin 100mg - may take up to three times daily. Recommend starting with bedtime dose to help with sleep and then use as needed during the daytime for anxiety and mood lability until lamictal is on board. If 100mg is not sufficient, increase to 200mg, and if that is not sufficient, you may increase to 300mg.    Below I have attached some easy lifestyle changes and information about using food as medicine for you to consider. Below this there are also resources for therapists in the area.     Food & Mood - sugar cravings, weight gain, and binge eating are highly prevalent with depression. The reasons for this are correlated with gut health, cortisol and blood sugar production. When depressed, our body craves increased serotonin, which is readily available when we eat simple carbs (sugary foods), which increases insulin and allows amino acids like Tryptophan (that thing in turkey and milk that makes you tired) to enter the brain. Tryptophan helps our body produce serotonin. Because this is such an immediate response, we naturally turn toward these foods instead of high protein or high fiber foods, which also leads to blood sugar dysregulation. High protein and high fiber foods help maintain blood sugar and prevent \"crashes.\" Because of this dysregulation, sleep disruption also accompanies this vicious cycle. AND candida, found in the gut, can cause further dysregulation as it feeds on sugar, so if we have gut issues, we will also crave sugar. Gut issues and mood disorders go hand in hand 99% of the time. So, one goal should be to improve gut health, which can improve mood, sleep, and overall health.     Ways to do this include -    1. Mindful eating - this may sound excessive, but you should chew every " bite 20-30 times before swallowing. Doing so makes digestion so much easier on your body. Not doing so can cause bloat, abdominal discomfort, distention, AND our body is not able to absorb the nutrients from the food.     2. Bitters - consider adding dandelion leaves and arugula to your meals or salad, OR you can choose a tea with dandelion leaf and roots. [employer] sells one on [address], or you can purchase Ean Detox with dandelion, nettle and grapefruit at most grocery stores. Bitters help stimulate effective digestion either before or during meals. The detox part is real and highly recommended by many nutritionists right now given the amount of toxins we are exposed to daily.    3. Avoid consumption of water and fluids 30 minutes prior to eating and up to two hours after. Fluids dilute the acids that aid in digestion, which often contributes to issues like GERD, indigestion, and bloating - this is a hard one for many who have lifelong habits of consuming fluids while we eat.          Foods are medicine:    Foods for anxiety   Green Leafy Veggies = magnesium, calms neurotransmitters in the brain = BAM, and decreases inflammation. Contain Iron. Low Iron levels can cause increased anxiety.     Flax Seeds = Omega 3 Fatty Acids, which are crucial for brain health, decrease inflammation in the brain, and (for women) contain phytoestrogens, which help if you are estrogen dominant (this can cause PMS, increased anxiety a week prior to menstruation, and painful periods).    Turkey = increased protein, which helps balance blood sugar throughout the day, and Tryptophan = serotonin.   Foods for Stress Relief  So, what foods reduce stress and anxiety? A psychobiotic diet is high in fiber and prebiotic foods, which also tend to be higher in vitamins, minerals, and other healthy nutrients.  This diet involves consuming primarily:  Whole grains - i.e., oats, barley, and quinoa  Prebiotic fruits - i.e., apples,  bananas, and berries  Prebiotic vegetables - i.e., onions, leeks, and cabbage  Fermented foods - i.e., sauerkraut, kefir, and Kombucha  Legumes - i.e., beans, chickpeas, and peanuts  Incorporating foods from each category into your diet may help you feel less stress and anxiety. You might eat oatmeal with apple chunks for breakfast, for instance, and a salad with chickpeas and onions at lunch. Both of these meals would fit into a psychobiotic eating plan.  Stress Foods to Avoid  Just as it's important to know what foods you can eat to reduce stress, it's equally essential to know which ones to avoid due to their ability to increase the amount of stress you feel.  The psychobiotic diet in the study was low in processed foods and discouraged the consumption of fast food and sugary drinks.  Additional foods that cause stress and anxiety to increase include: (4)  Refined carbs - white bread and pasta  Foods with added sugars - cookies, cakes, and other desserts  Those that contain caffeine - coffee, tea, and chocolate    Easy lifestyle changes to improve overall well-being:     Hydration - Dehydration can cause irritability, headaches, brain fog, and binge eating.   If you dislike water, add flavor to increase compliance - lemon, citrus, fruit, cucumber, True Lemon/Lime packets. Individual amounts and needs vary, but target 2-3 L per day, 72-100g.    Gut health -   Bone broth daily - Bone broth contains collagen and L-glutamine, both heal leaky gut.    Fermented foods - [place], miso, kimche, yogurt, sauerkraut, probiotics = healthy gut bacteria = healthy mood.    Legumes/Lentils/Beans -  Preobiotics are a form of fiber which are food for good gut bacteria. Prebiotics feed Probiotics.     Hi-protein, hi-fat breakfasts - Skipping meals decreases blood sugar which increases ADHD symptoms. Breakfast is the worst meal to skip.     Eat earlier - Eating too late in the day can cause sleep disruption. Avoid heavy meals after  "6pm, or 3-4 hours before your normal bedtime.         Easy Lifestyle changes to improve sleep balance:     No caffeine after 2-3 pm, even if you think it doesn't disrupt your sleep, the half life of caffeine is 8 hours, so the metabolization of this alone can cause restless sleep. It's worse if there's no food in your stomach.    Keep your sleep area less than 65 degrees - the body falls into deeper sleep when it's cooler, sort of like \"hibernation,\" which is why we are more tired in the colder months.     Zinc supplement - zinc is necessary for melatonin production. Foods with zinc include most meats, legumes, nuts/peanuts, seeds, shellfish, dark chocolate, dairy, eggs, and whole grains.    Iron - Iron deficiency can cause dysregulation of dopamine in the brain, which is linked to depression, anxiety, MIGRAINES, INSOMNIA, RLS, sleep apnea, and ADHD.     Exercise - avoid heavy exercise prior to bed. Exercise is great for promoting sleep, but not too close to bed. Aim for at least two hours before.        Waking up exhausted -     AM light - 10-15 minutes of light in the morning will help with cortisol metabolism (stress hormone) = better ability to cope with stressors, improved sleep at night.     Mindfulness Meditation - 2-5 minutes per day helps rewire the brain and helps cortisol levels.     Magnesium Glycinate or Theonate - Supports adrenal function, which regulates cortisol levels and improves sleep and brain fog.       Other weird things:  10-15 minutes of outside light in the morning can help cortisol metabolism - it doesn't even have to be sunlight - cloudy light, whatever. This helps with stress and sleep.     Mindful meditation - Just 2-5 minutes per day can help rewire your brain and help cortisol levels!    Magnesium glycinate or threonate supports adrenal function, helps balance cortisol, improve sleep and clear brain fog.     Dark chocolate - has magnesium (you get the trend with this), small amount of " caffeine buffered by healthy fats to decrease absorption and improve focus, balances HPA axis (hypothalamus pituitary/adrenal), which improves cortisol balance and metabolism, and works as a pre-biotic - food for your probiotic and improve gut elizabeth.     Hydration - dehydration can cause irritability, brain fog and binge eating.    Bone broth - collagen and glutamine can heal leaky gut, which can contribute to poor health, mood issues, anxiety, sleep disruption, migraines, brain fog and fatigue. Daily consumption - just a cup or two - can be very supportive to gut health.    Fermented foods - tempe, miso, kimches, yogurt, sauerkraut, kombucha - all good nutritional probiotic sources = healthy gut = better mood, sleep, etc.    Legumes/lentils and beans (kidney, black, navy, etc) - pre-biotics and fiber, good for gut bacteria, food for probiotics, fiber decreases sugar cravings related to crashes and cortisol imbalance.     Eating a high protein high fat breakfast is key. Avoid high carb lunches to decrease mid-day crash. Eating too late can disrupt sleep - this is not a myth.     Supplements (Always use with caution, after consulting with your physician and according to package instructions:    Rhodiola - a stimulating adaptogen may improve motivation; however, need to be careful with high anxiety or possibility of delmi.     Ashwagandha - a calming adaptogen that has many benefits, including sleep, stress relief, increased energy/motivation, increased libido, reduction in anxiety and depression, helps normalize cortisol levels (this is huge) and thyroid hormones, it has antimicrobial properties and may decrease candida in the gut and elsewhere (think yeast), possibly aids in weight reduction, and helps build muscle mass. Things to watch for - too much can upset the stomach, cause drowsiness, and there have been reports of liver toxicity. Interestingly, there are also studies supporting liver repair with use. There's  a lot of information regarding the reasonings for this relating to Withanone a metabolite of ashwagandha and appears to be jamshid-related.     Saffron - improvement in mood, libido, sexual function, immunities, anti-inflammatory, and some evidence of enhanced weight loss support. High in antioxidants. There are supplements and it can be added to food. Some people drink saffron infused water which is supposed to be good for skin, too. Has been studied at 100mg daily up to 26 weeks in supplemental form and found to be safe. Some s/e can include GI upset and drowsiness at high doses.   The important thing to remember with supplements, is that although typically safe, we all have individual factors that may increase risk. It's important to always pay attention to what your body is telling you and stick to dosage recommendations.       When considering any significant lifestyle changes, addition of exercise, or introduction of supplements, be sure to consult with your medical provider, especially if you have any medical conditions or you are pregnant. Supplements are not regulated by the FDA, and even though they are natural, many have side effects, some of which can be serious when used in combination, used with prescription medications or used when there are certain medical conditions present, such as liver and/or kidney disorders and insufficiencies, seizure disorders, or even sometimes mood and psychiatric disorders. All supplements should be used according to package instructions and personal exploration into brands to ensure they are reputable, is essential to maintaining your well-being. Even though there may be benefits and healing properties to many supplements, it is very important to listen to your body when you start anything new. Remember, natural is not always better.     Please reach out to your provider if you have any questions.             Call the clinic with medication questions or concerns at  "[phone] or send a [facility] message. MyChart may be used to communicate with your provider, but this is not intended to be used for emergencies.     Crisis Resources for [facility]: First Call for Help: (211), [facility] Crisis Line available 24/7: ([phone])  National Crisis Services: National Crisis Text Line: text HOME to 610552    Crisis Resources for [place]: Adult Mental Health crisis: [phone], [place] Crisis Text Line: text \"[place]\" or \"HOME\" to 827059    Therapy Options in [place], [place] and Surrounding Area:    Demar Epps, [facility] - ([phone])    [facility] - [facility] ([phone])    Jes Mariee, [facility], family and individual therapy- ([phone])    [facility] - specializes in women and adolescent therapy - ([phone])    [facility] - EMDR, trauma, etc. ([phone])    [facility] - spiritual based support groups ([phone])    [facility] - adults, adolescents and children ([phone])    [facility] - several different therapy options adults and children ([phone])    [facility] - several options, one of the largest mental health providers in the area - ([phone])    [facility] - ([phone])    [facility] - offers several therapy options including 8-week \"express\" therapy program - ([phone])    [facility] - ([phone])    [facility] - adult therapy ([phone])    [facility] - ([phone])    [facility] - ([phone])    [facility] - ([phone])    [facility] - ([phone])    [facility], [place] - ([phone])    [facility], [place] - [phone]    [facility], [place] - [phone]    [facility], [place] - [phone]  "
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
## 2025-05-29 NOTE — NURSING NOTE
"Chief Complaint   Patient presents with    Medication Therapy Management    Manic Behavior   Patient presents to the Clinic today for medication management and bipolar.    Initial /83 (BP Location: Right arm, Patient Position: Sitting, Cuff Size: Adult Large)   Pulse 82   Temp 100.1  F (37.8  C) (Tympanic)   Resp 20   Ht 1.699 m (5' 6.9\")   Wt 108.6 kg (239 lb 6.4 oz)   LMP 04/02/2025 (Exact Date)   SpO2 98%   BMI 37.61 kg/m   Estimated body mass index is 37.61 kg/m  as calculated from the following:    Height as of this encounter: 1.699 m (5' 6.9\").    Weight as of this encounter: 108.6 kg (239 lb 6.4 oz).  Medication Review: complete    The next two questions are to help us understand your food security.  If you are feeling you need any assistance in this area, we have resources available to support you today.          5/29/2025   SDOH- Food Insecurity   Within the past 12 months, did you worry that your food would run out before you got money to buy more? N   Within the past 12 months, did the food you bought just not last and you didn t have money to get more? N         Health Care Directive:  Patient does not have a Health Care Directive: Discussed advance care planning with patient; however, patient declined at this time.    Airam Fierro      "

## 2025-06-03 ENCOUNTER — HOSPITAL ENCOUNTER (OUTPATIENT)
Dept: ULTRASOUND IMAGING | Facility: OTHER | Age: 35
Discharge: HOME | End: 2025-06-03
Attending: NURSE PRACTITIONER
Payer: COMMERCIAL

## 2025-06-03 DIAGNOSIS — N92.6 MISSED PERIOD: ICD-10-CM

## 2025-06-03 PROCEDURE — 76856 US EXAM PELVIC COMPLETE: CPT | Mod: 26 | Performed by: RADIOLOGY

## 2025-06-03 PROCEDURE — 76830 TRANSVAGINAL US NON-OB: CPT | Mod: 26 | Performed by: RADIOLOGY

## 2025-06-03 PROCEDURE — 76856 US EXAM PELVIC COMPLETE: CPT

## 2025-07-01 ENCOUNTER — RESULTS FOLLOW-UP (OUTPATIENT)
Dept: FAMILY MEDICINE | Facility: OTHER | Age: 35
End: 2025-07-01

## 2025-07-01 ENCOUNTER — OFFICE VISIT (OUTPATIENT)
Dept: FAMILY MEDICINE | Facility: OTHER | Age: 35
End: 2025-07-01
Attending: NURSE PRACTITIONER
Payer: COMMERCIAL

## 2025-07-01 VITALS
DIASTOLIC BLOOD PRESSURE: 74 MMHG | SYSTOLIC BLOOD PRESSURE: 126 MMHG | TEMPERATURE: 100.4 F | HEIGHT: 67 IN | HEART RATE: 83 BPM | RESPIRATION RATE: 18 BRPM | OXYGEN SATURATION: 98 % | WEIGHT: 242.4 LBS | BODY MASS INDEX: 38.04 KG/M2

## 2025-07-01 DIAGNOSIS — T63.441A ALLERGIC REACTION TO BEE STING: ICD-10-CM

## 2025-07-01 DIAGNOSIS — N92.6 LATE PERIOD: ICD-10-CM

## 2025-07-01 DIAGNOSIS — Z32.01 PREGNANCY TEST POSITIVE: Primary | ICD-10-CM

## 2025-07-01 LAB — HCG UR QL: POSITIVE

## 2025-07-01 PROCEDURE — 81025 URINE PREGNANCY TEST: CPT | Mod: ZL | Performed by: NURSE PRACTITIONER

## 2025-07-01 PROCEDURE — G0463 HOSPITAL OUTPT CLINIC VISIT: HCPCS

## 2025-07-01 RX ORDER — EPINEPHRINE 0.3 MG/.3ML
0.3 INJECTION SUBCUTANEOUS
Qty: 0.6 ML | Refills: 11 | Status: SHIPPED | OUTPATIENT
Start: 2025-07-01

## 2025-07-01 ASSESSMENT — PAIN SCALES - GENERAL: PAINLEVEL_OUTOF10: MODERATE PAIN (6)

## 2025-07-01 NOTE — PROGRESS NOTES
"  Assessment & Plan   Problem List Items Addressed This Visit    None  Visit Diagnoses         Pregnancy test positive    -  Primary    Relevant Orders    Ob/Gyn  Referral      Allergic reaction to bee sting        Relevant Medications    EPINEPHrine (ANY BX GENERIC EQUIV) 0.3 MG/0.3ML injection 2-pack      Late period        Relevant Orders    Pregnancy, Urine (HCG) (Completed)           Urine pregnancy updated today, this was positive.  Based on last period, estimated date of delivery would be 1/12/2026.  Referral to gynecology placed.  EpiPen refilled per request.       The longitudinal plan of care for the diagnosis(es)/condition(s) as documented were addressed during this visit. Due to the added complexity in care, I will continue to support Alexy in the subsequent management and with ongoing continuity of care.    Subjective   Alexy is a 34 year old, presenting for the following health issues:  Follow Up (ultrasound)      7/1/2025     2:12 PM   Additional Questions   Roomed by Eliane king   Accompanied by      History of Present Illness       Reason for visit:  Pregnancy    She eats 2-3 servings of fruits and vegetables daily.She consumes 2 sweetened beverage(s) daily.She exercises with enough effort to increase her heart rate 60 or more minutes per day.  She exercises with enough effort to increase her heart rate 7 days per week. She is missing 2 dose(s) of medications per week.      She presents to clinic to follow-up.  She was seen by myself approximately 2 months ago.  Urine pregnancy test at that time was negative.  Ultrasound ordered, 4 mm cystic structure in the endometrium, concerning for possible early pregnancy.  She reports her last menstrual period was 4/7/2025.  She did do a home pregnancy test and this was +3 to 4 days ago.  She has not been birth control since November.        Objective    /74   Pulse 83   Temp 100.4  F (38  C) (Tympanic)   Resp 18   Ht 1.689 m (5' 6.5\")  "  Wt 110 kg (242 lb 6.4 oz)   LMP 04/02/2025 (Exact Date)   SpO2 98%   Breastfeeding No   BMI 38.54 kg/m    Body mass index is 38.54 kg/m .  Physical Exam   GENERAL: alert and no distress  EYES: Eyes grossly normal to inspection  NEURO: Normal strength and tone, mentation intact and speech normal  PSYCH: mentation appears normal, affect normal/bright    Results for orders placed or performed in visit on 07/01/25   Pregnancy, Urine (HCG)     Status: Abnormal   Result Value Ref Range    hCG Urine Qualitative Positive (A) Negative             Signed Electronically by: BRYANT Jones CNP

## 2025-07-01 NOTE — LETTER
My Asthma Action Plan    Name: Alexy Love   YOB: 1990  Date: 7/1/2025   My doctor: BRYANT Jones CNP   My clinic: [facility]        My Rescue Medicine:     My Asthma Severity:   Intermittent / Exercise Induced  Know your asthma triggers: exercise or sports             GREEN ZONE   Good Control  I feel good  No cough or wheeze  Can work, sleep and play without asthma symptoms       Take your asthma control medicine every day.     If exercise triggers your asthma, take your rescue medication  15 minutes before exercise or sports, and  During exercise if you have asthma symptoms  Spacer to use with inhaler: If you have a spacer, make sure to use it with your inhaler             YELLOW ZONE Getting Worse  I have ANY of these:  I do not feel good  Cough or wheeze  Chest feels tight  Wake up at night   Keep taking your Green Zone medications  Start taking your rescue medicine:  every 20 minutes for up to 1 hour. Then every 4 hours for 24-48 hours.  If you stay in the Yellow Zone for more than 12-24 hours, contact your doctor.  If you do not return to the Green Zone in 12-24 hours or you get worse, start taking your oral steroid medicine if prescribed by your provider.           RED ZONE Medical Alert - Get Help  I have ANY of these:  I feel awful  Medicine is not helping  Breathing getting harder  Trouble walking or talking  Nose opens wide to breathe       Take your rescue medicine NOW  If your provider has prescribed an oral steroid medicine, start taking it NOW  Call your doctor NOW  If you are still in the Red Zone after 20 minutes and you have not reached your doctor:  Take your rescue medicine again and  Call 911 or go to the emergency room right away    See your regular doctor within 2 weeks of an Emergency Room or Urgent Care visit for follow-up treatment.          Annual Reminders:  Meet with Asthma Educator,  Flu Shot in the Fall, consider Pneumonia Vaccination for  patients with asthma (aged 19 and older).    Pharmacy: [id number] - [place], [place] - [address]    Electronically signed by BRYANT Jones CNP   Date: 07/01/25                    Asthma Triggers  How To Control Things That Make Your Asthma Worse    Triggers are things that make your asthma worse.  Look at the list below to help you find your triggers and   what you can do about them. You can help prevent asthma flare-ups by staying away from your triggers.      Trigger                                                          What you can do   Cigarette Smoke  Tobacco smoke can make asthma worse. Do not allow smoking in your home, car or around you.  Be sure no one smokes at a child s day care or school.  If you smoke, ask your health care provider for ways to help you quit.  Ask family members to quit too.  Ask your health care provider for a referral to Quit Plan to help you quit smoking, or call 9-278-485-PLAN.     Colds, Flu, Bronchitis  These are common triggers of asthma. Wash your hands often.  Don t touch your eyes, nose or mouth.  Get a flu shot every year.     Dust Mites  These are tiny bugs that live in cloth or carpet. They are too small to see. Wash sheets and blankets in hot water every week.   Encase pillows and mattress in dust mite proof covers.  Avoid having carpet if you can. If you have carpet, vacuum weekly.   Use a dust mask and HEPA vacuum.   Pollen and Outdoor Mold  Some people are allergic to trees, grass, or weed pollen, or molds. Try to keep your windows closed.  Limit time out doors when pollen count is high.   Ask you health care provider about taking medicine during allergy season.     Animal Dander  Some people are allergic to skin flakes, urine or saliva from pets with fur or feathers. Keep pets with fur or feathers out of your home.    If you can t keep the pet outdoors, then keep the pet out of your bedroom.  Keep the bedroom door closed.  Keep pets  off cloth furniture and away from stuffed toys.     Mice, Rats, and Cockroaches  Some people are allergic to the waste from these pests.   Cover food and garbage.  Clean up spills and food crumbs.  Store grease in the refrigerator.   Keep food out of the bedroom.   Indoor Mold  This can be a trigger if your home has high moisture. Fix leaking faucets, pipes, or other sources of water.   Clean moldy surfaces.  Dehumidify basement if it is damp and smelly.   Smoke, Strong Odors, and Sprays  These can reduce air quality. Stay away from strong odors and sprays, such as perfume, powder, hair spray, paints, smoke incense, paint, cleaning products, candles and new carpet.   Exercise or Sports  Some people with asthma have this trigger. Be active!  Ask your doctor about taking medicine before sports or exercise to prevent symptoms.    Warm up for 5-10 minutes before and after sports or exercise.     Other Triggers of Asthma  Cold air:  Cover your nose and mouth with a scarf.  Sometimes laughing or crying can be a trigger.  Some medicines and food can trigger asthma.

## 2025-07-01 NOTE — NURSING NOTE
Patient is here to follow up on ultrasound results.       Patient's last menstrual period was 04/02/2025 (exact date).  Medication Reconciliation: complete    Eliane Chong LPN 7/1/2025 2:15 PM       Advance care directive on file? no  Advance care directive provided to patient? no       Eliane Chong LPN

## 2025-07-10 ENCOUNTER — PRENATAL OFFICE VISIT (OUTPATIENT)
Dept: OBGYN | Facility: OTHER | Age: 35
End: 2025-07-10
Attending: OBSTETRICS & GYNECOLOGY
Payer: COMMERCIAL

## 2025-07-10 VITALS
DIASTOLIC BLOOD PRESSURE: 70 MMHG | HEART RATE: 88 BPM | WEIGHT: 242.4 LBS | HEIGHT: 67 IN | SYSTOLIC BLOOD PRESSURE: 130 MMHG | BODY MASS INDEX: 38.04 KG/M2

## 2025-07-10 DIAGNOSIS — O36.80X0 ENCOUNTER TO DETERMINE FETAL VIABILITY OF PREGNANCY: ICD-10-CM

## 2025-07-10 DIAGNOSIS — Z32.01 PREGNANCY TEST POSITIVE: Primary | ICD-10-CM

## 2025-07-10 NOTE — PROGRESS NOTES
HPI:    This is a 34 year old female patient,  who presents today for OB Intake visit. Patient reports positive pregnancy test at home.     Obstetrical history and OB Questionnaire updated to the best of this nurse's ability based on patient report. PHQ-9 depression screening and routine Domestic Abuse screening completed. All immediate questions and concerns answered.    FOOD SECURITY SCREENING QUESTIONS:    The next two questions are to help us understand your food security.  If you are feeling you need any assistance in this area, we have resources available to support you today.    Hunger Vital Signs:  Within the past 12 months we worried whether our food would run out before we got money to buy more. Never  Within the past 12 months the food we bought just didn't last and we didn't have money to get more. Never    Last menstrual period is reported as Patient's last menstrual period was 2025. NAN based on LMP is Estimated Date of Delivery: 2026.  Her cycles are irregular.  Her last menstrual period was abnormal did not have any prior.  Since her LMP, she has experienced  patient denies any symptoms.       OBSTETRIC HISTORY:    OB History    Para Term  AB Living   6 4 4 0 1 5   SAB IAB Ectopic Multiple Live Births   0 1 0 1 5      # Outcome Date GA Lbr Jin/2nd Weight Sex Type Anes PTL Lv   6 Current            5 IAB 21     IAB      4 Term 01/10/18 37w0d  3.13 kg (6 lb 14.4 oz) M Vag-Spont         Name: JERRY NAVARRO      Apgar1: 9  Apgar5: 10   3A Term 14 37w1d   M Vag-Spont   SHRUTI   3B Term 14 37w1d   F Vag-Breech   SHRUTI   2 Term 10/2009  01:00 3.289 kg (7 lb 4 oz) M Vag-Spont   SHRUTI      Name: Horton   1 Term 2008  03:00 4.082 kg (9 lb) F Vag-Spont   SHRUTI      Name: Ascencion       Age of first pregnancy: 16  Previous OB Provider: Dr. Leung  Previous Delivering Clinic: [facility]  Release of Records: NA    Current delivery plan: [facility]  Preferred OB Provider:   Whit  Current Primary Care Provider: Dulce Powers  Pediatrician: Dr. Ambriz    Additional History: -hx twin pregnancy  -Patient stated needing blood transfusion during her first pregnancy in 2008  -Hx oral herpes/cold sores     Have you travelled during the pregnancy?No  Have your sexual partner(s) travelled during the pregnancy?No      HISTORY:   Planned Pregnancy: Yes  Marital Status: Legally   Occupation: [employer]  Living in Household: Significant Other and Children    Father of the baby is involved.   Family and father of baby is supportive of current pregnancy.  Past Medical History of Father of Baby:Hypertension    Past History:  Her past medical history   Past Medical History:   Diagnosis Date    Cholestasis during pregnancy     History of twin pregnancy in prior pregnancy 06/20/2017    Personal history of other medical treatment (CODE)     No Comments Provided   .      Her past surgical history:   Past Surgical History:   Procedure Laterality Date    HYSTEROSCOPY DIAGNOSTIC N/A 01/05/2022    Procedure: Hysteroscopy with Dilation and Currettage, with myosure;  Surgeon: Bobby Leung MD;  Location:  OR    OTHER SURGICAL HISTORY      [id number],OTHER,age 4       She has a history of  Cholecystis in last pregnancy    Since her LMP she admits to the use of vaping.    Genetics History: Interested in Genetic Screening    Pap smear history:         Latest Ref Rng & Units 11/6/2024     2:30 PM 3/12/2021    11:15 AM   PAP / HPV   PAP  Negative for Intraepithelial Lesion or Malignancy (NILM)     PAP (Historical)   NIL    HPV 16 DNA Negative Negative  Negative    HPV 18 DNA Negative Negative  Negative    Other HR HPV Negative Negative  Negative        STD/STI history: Chlamydia      STD/STI symptoms: no noticeable symptoms     Past medical, surgical, social and family history were reviewed and updated in EPIC.    Medications reviewed by this nurse. Current medication list:  Current Outpatient Medications    Medication Sig Dispense Refill    EPINEPHrine (ANY BX GENERIC EQUIV) 0.3 MG/0.3ML injection 2-pack Inject 0.3 mLs (0.3 mg) into the muscle once as needed for anaphylaxis. 0.6 mL 11    gabapentin (NEURONTIN) 100 MG capsule Take 1 capsule (100 mg) by mouth 3 times daily. 90 capsule 1    lamoTRIgine (LAMICTAL) 100 MG tablet Take 1 tablet (100 mg) by mouth at bedtime. (After completing 14 days on 75mg) 30 tablet 1    lamoTRIgine (LAMICTAL) 25 MG tablet Take 3 tablets (75 mg) by mouth at bedtime. Take 3 tabs at bed x 14 nights. 42 tablet 0    Prenatal MV & Min w/FA-DHA (PRENATAL ADULT GUMMY/DHA/FA PO) Take 2 tablets by mouth daily.      SUMAtriptan (IMITREX) 25 MG tablet Take 1 tablet (25 mg) by mouth at onset of headache for migraine. May repeat in 2 hours. Max 30 tablets per month 30 tablet 3     The following medications were recommended to be discontinued due to Pregnancy Category D status: Has discussed with PCP and Dr. Mata  Patient informed to contact her primary care provider as soon as possible to discuss a safer alternative.    Influenza vaccine: Flu Vaccine [facility] OB: Patient declined  COVID vaccine: COVID vaccine status [facility] OB: Patient declined     Risk factors:  Moderate and moderately severe risks (consult with OB/Gyn)  Previous fetal or  demise: No  History of  delivery: No  History of heart disease Class I: No  Severe anemia, unresponsive to iron therapy: No  Pelvic mass or neoplasm: No  Previous : No  Hyper/hypothyroidism: No  History of postpartum hemorrhage requiring transfusion:Yes-first pregnancy   History of Placenta Accreta: No    High Risk (Pregnancy managed by OB/Gyn)  Multiple pregnancy: Yes  Pre-gestational diabetes: No  Chronic Hypertension: No  Renal Failure: No  Heart disease, class II or greater: No  Rh Isoimmunization: No  Chronic active hepatitis: No  Convulsive disorder, poorly controlled: No  Isoimmune thrombocytopenia: No  Pre-term premature rupture of  membranes: No  Lupus or other autoimmune disorder: No  Human Immunodeficiency Virus: No    HCG Qual Urine   Date Value Ref Range Status   2017 Positive (Pos) Negative      Comment:     Is Rh typing necessary?     hCG Urine Qualitative   Date Value Ref Range Status   2025 Positive (A) Negative Final     Comment:     This test is for screening purposes.  Results should be interpreted along with the clinical picture.  Confirmation testing is available if warranted by ordering [id number], HCG Quantitative Pregnancy.       ASSESSMENT/PLAN:       ICD-10-CM    1. Encounter to determine fetal viability of pregnancy  O36.80X0       2. Pregnancy test positive  Z32.01 Ob/Gyn  Referral          34 year old , 13w3d of pregnancy with NAN of 2026, by Last Menstrual Period    Urine pregnancy test was completed 25 and results are noted above, politely declined another urine pregnancy test or blood work at this time.    Per standing orders and scope of practice of this nurse, patient will have the following orders placed and completed prior to initial OB visit with the appropriate provider:    --early ultrasound for dating and viability ordered for 6+ weeks gestation based on LMP    --Quantitative Beta HCG and progesterone monitoring if indicated    Counseling given:     - Recommended weight gain for pregnancy: < 15 lbs.   BMI < 18.5  28-40 lbs   18.5 - 24.9 25-35   25 - 29.9 15-25   > 30  < 15       PLAN/PATIENT INSTRUCTIONS:    Normal exercise.  Normal sexual activity.  Prenatal vitamins.  Anticipated weight gain.    follow-up appointment with Dr. Sherman for pre- care and take multivitamin or pre-amalia vitamins    Lubna Powers RN.............................................. 7/10/2025 2:20 PM

## 2025-07-11 ENCOUNTER — HOSPITAL ENCOUNTER (OUTPATIENT)
Dept: ULTRASOUND IMAGING | Facility: OTHER | Age: 35
Discharge: HOME | End: 2025-07-11
Payer: COMMERCIAL

## 2025-07-11 DIAGNOSIS — Z34.91 PRENATAL CARE IN FIRST TRIMESTER: ICD-10-CM

## 2025-07-11 PROCEDURE — 76801 OB US < 14 WKS SINGLE FETUS: CPT | Mod: 26 | Performed by: RADIOLOGY

## 2025-07-11 PROCEDURE — 76801 OB US < 14 WKS SINGLE FETUS: CPT

## 2025-08-05 ENCOUNTER — HOSPITAL ENCOUNTER (EMERGENCY)
Facility: OTHER | Age: 35
Discharge: HOME | End: 2025-08-05
Attending: STUDENT IN AN ORGANIZED HEALTH CARE EDUCATION/TRAINING PROGRAM
Payer: COMMERCIAL

## 2025-08-05 ENCOUNTER — APPOINTMENT (OUTPATIENT)
Dept: GENERAL RADIOLOGY | Facility: OTHER | Age: 35
End: 2025-08-05
Attending: STUDENT IN AN ORGANIZED HEALTH CARE EDUCATION/TRAINING PROGRAM
Payer: COMMERCIAL

## 2025-08-05 ENCOUNTER — PATIENT OUTREACH (OUTPATIENT)
Dept: CARE COORDINATION | Facility: CLINIC | Age: 35
End: 2025-08-05

## 2025-08-05 VITALS
OXYGEN SATURATION: 96 % | WEIGHT: 243 LBS | DIASTOLIC BLOOD PRESSURE: 93 MMHG | SYSTOLIC BLOOD PRESSURE: 136 MMHG | RESPIRATION RATE: 20 BRPM | BODY MASS INDEX: 38.06 KG/M2 | TEMPERATURE: 97.9 F | HEART RATE: 107 BPM

## 2025-08-05 DIAGNOSIS — S31.811A LACERATION OF RIGHT BUTTOCK, INITIAL ENCOUNTER: ICD-10-CM
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
## 2025-08-05 PROCEDURE — 250N000011 HC RX IP 250 OP 636: Performed by: STUDENT IN AN ORGANIZED HEALTH CARE EDUCATION/TRAINING PROGRAM

## 2025-08-05 PROCEDURE — 99283 EMERGENCY DEPT VISIT LOW MDM: CPT | Performed by: STUDENT IN AN ORGANIZED HEALTH CARE EDUCATION/TRAINING PROGRAM

## 2025-08-05 PROCEDURE — 99282 EMERGENCY DEPT VISIT SF MDM: CPT | Mod: 25 | Performed by: STUDENT IN AN ORGANIZED HEALTH CARE EDUCATION/TRAINING PROGRAM

## 2025-08-05 PROCEDURE — 99283 EMERGENCY DEPT VISIT LOW MDM: CPT | Mod: 25 | Performed by: STUDENT IN AN ORGANIZED HEALTH CARE EDUCATION/TRAINING PROGRAM

## 2025-08-05 PROCEDURE — 73502 X-RAY EXAM HIP UNI 2-3 VIEWS: CPT

## 2025-08-05 PROCEDURE — 12032 INTMD RPR S/A/T/EXT 2.6-7.5: CPT | Performed by: STUDENT IN AN ORGANIZED HEALTH CARE EDUCATION/TRAINING PROGRAM

## 2025-08-05 PROCEDURE — 73502 X-RAY EXAM HIP UNI 2-3 VIEWS: CPT | Mod: 26 | Performed by: RADIOLOGY

## 2025-08-05 RX ORDER — LIDOCAINE HYDROCHLORIDE AND EPINEPHRINE 10; 10 MG/ML; UG/ML
5 INJECTION, SOLUTION INFILTRATION; PERINEURAL ONCE
Status: COMPLETED | OUTPATIENT
Start: 2025-08-05 | End: 2025-08-05

## 2025-08-05 RX ADMIN — LIDOCAINE HYDROCHLORIDE,EPINEPHRINE BITARTRATE 5 ML: 10; .01 INJECTION, SOLUTION INFILTRATION; PERINEURAL at 01:33

## 2025-08-05 ASSESSMENT — ACTIVITIES OF DAILY LIVING (ADL)
ADLS_ACUITY_SCORE: 41

## 2025-08-05 ASSESSMENT — COLUMBIA-SUICIDE SEVERITY RATING SCALE - C-SSRS
6. HAVE YOU EVER DONE ANYTHING, STARTED TO DO ANYTHING, OR PREPARED TO DO ANYTHING TO END YOUR LIFE?: NO
2. HAVE YOU ACTUALLY HAD ANY THOUGHTS OF KILLING YOURSELF IN THE PAST MONTH?: NO
1. IN THE PAST MONTH, HAVE YOU WISHED YOU WERE DEAD OR WISHED YOU COULD GO TO SLEEP AND NOT WAKE UP?: NO

## 2025-08-11 ENCOUNTER — PRENATAL OFFICE VISIT (OUTPATIENT)
Dept: OBGYN | Facility: OTHER | Age: 35
End: 2025-08-11
Attending: OBSTETRICS & GYNECOLOGY
Payer: COMMERCIAL

## 2025-08-11 VITALS
SYSTOLIC BLOOD PRESSURE: 120 MMHG | WEIGHT: 243.3 LBS | DIASTOLIC BLOOD PRESSURE: 60 MMHG | BODY MASS INDEX: 38.11 KG/M2 | HEART RATE: 74 BPM

## 2025-08-11 DIAGNOSIS — O09.529 SUPERVISION OF HIGH-RISK PREGNANCY OF ELDERLY MULTIGRAVIDA: Primary | ICD-10-CM

## 2025-08-11 DIAGNOSIS — Z87.59 HISTORY OF CHOLESTASIS DURING PREGNANCY: ICD-10-CM

## 2025-08-11 DIAGNOSIS — Z3A.15 15 WEEKS GESTATION OF PREGNANCY: ICD-10-CM

## 2025-08-11 DIAGNOSIS — Z87.19 HISTORY OF CHOLESTASIS DURING PREGNANCY: ICD-10-CM

## 2025-08-11 DIAGNOSIS — O99.340 DEPRESSION DURING PREGNANCY, ANTEPARTUM: ICD-10-CM

## 2025-08-11 DIAGNOSIS — F32.A DEPRESSION DURING PREGNANCY, ANTEPARTUM: ICD-10-CM

## 2025-08-11 PROBLEM — K80.10 CHRONIC CHOLECYSTITIS WITH CALCULUS: Status: RESOLVED | Noted: 2025-01-15 | Resolved: 2025-08-11

## 2025-08-11 RX ORDER — FOLIC ACID 1 MG/1
4 TABLET ORAL DAILY
Qty: 120 TABLET | Refills: 3 | Status: SHIPPED | OUTPATIENT
Start: 2025-08-11

## 2025-08-11 ASSESSMENT — PATIENT HEALTH QUESTIONNAIRE - PHQ9
SUM OF ALL RESPONSES TO PHQ QUESTIONS 1-9: 3
10. IF YOU CHECKED OFF ANY PROBLEMS, HOW DIFFICULT HAVE THESE PROBLEMS MADE IT FOR YOU TO DO YOUR WORK, TAKE CARE OF THINGS AT HOME, OR GET ALONG WITH OTHER PEOPLE: NOT DIFFICULT AT ALL
SUM OF ALL RESPONSES TO PHQ QUESTIONS 1-9: 3

## 2025-08-11 ASSESSMENT — PAIN SCALES - GENERAL: PAINLEVEL_OUTOF10: NO PAIN (0)

## 2025-08-20 ENCOUNTER — DOCUMENTATION ONLY (OUTPATIENT)
Dept: CARE COORDINATION | Facility: CLINIC | Age: 35
End: 2025-08-20
Payer: COMMERCIAL

## (undated) DEVICE — Device

## (undated) DEVICE — SOL WATER 1500ML

## (undated) DEVICE — PACK LAPAROSCOPY LF SBA15LPFCA

## (undated) DEVICE — ESU GROUND PAD ADULT W/CORD E7507

## (undated) DEVICE — ENDO TROCAR FIRST ENTRY KII FIOS Z-THRD 12X100MM CTF73

## (undated) DEVICE — SUCTION STRYKERFLOW II 250-070-500

## (undated) DEVICE — TUBING INSUFFLATOR W/FILTER OLYMPUS WA95005A

## (undated) DEVICE — SU VICRYL 3-0 CT-3 27" J327H

## (undated) DEVICE — SU MONOCRYL 4-0 PS-2 27" UND Y426H

## (undated) DEVICE — SLEEVE COMPRESSION SCD KNEE MED 74022

## (undated) DEVICE — GLOVE BIOGEL PI INDICATOR 8.0 LF 41680

## (undated) DEVICE — PAD PERI INDIV WRAP 11" 2022A

## (undated) DEVICE — PAD CHUX UNDERPAD 30X36" P3036C

## (undated) DEVICE — SYR 10ML FINGER CONTROL W/O NDL 309695

## (undated) DEVICE — GLOVE PROTEXIS POWDER FREE SMT 7.5  2D72PT75X

## (undated) DEVICE — DECANTER VIAL 2006S

## (undated) DEVICE — ESU HOLDER LAP INST DISP PURPLE LONG 330MM H-PRO-330

## (undated) DEVICE — ENDO POUCH UNIV RETRIEVAL SYSTEM INZII 10MM CD001

## (undated) DEVICE — DRSG TELFA 3X8" 1238

## (undated) DEVICE — PREP CHLORAPREP CLEAR 26ML APPLICATOR 260800

## (undated) DEVICE — SU VICRYL 0 UR-6 27" J603H

## (undated) DEVICE — GLOVE ESTEEM POWDER FREE SMT 8.0  2D72PT80

## (undated) DEVICE — ENDO TROCAR SLEEVE KII Z-THREADED 05X100MM CTS02

## (undated) DEVICE — PANTIES MESH LG/XLG 2PK 706M2

## (undated) DEVICE — LIGHT HANDLES PLASTIC

## (undated) DEVICE — LUBRICANT INST ELECTROLUBE EL101

## (undated) DEVICE — DRSG MEDIPORE 2X2 3/4" 3562

## (undated) DEVICE — ENDO SCOPE WARMER DUAL LAP TM500D

## (undated) DEVICE — ESU CORD MONOPOLAR 10'  E0510

## (undated) DEVICE — PACK LITHOTOMY SBA15LIFCA

## (undated) DEVICE — NDL SPINAL 20GA 3.5" 405182

## (undated) DEVICE — STRAP STIRRUP W/O SLIP 30187-020

## (undated) DEVICE — DRSG MEDIPORE 3 1/2X4" 3566

## (undated) DEVICE — TUBING IRR TUR Y TYPE 2C4041

## (undated) DEVICE — CLIP APPLIER ENDO ROTATING 10MM MED/LG ER320

## (undated) DEVICE — ENDO TROCAR SLEEVE KII 5X100MM CTR03

## (undated) RX ORDER — MAGNESIUM HYDROXIDE/ALUMINUM HYDROXICE/SIMETHICONE 120; 1200; 1200 MG/30ML; MG/30ML; MG/30ML
SUSPENSION ORAL
Status: DISPENSED
Start: 2022-04-29

## (undated) RX ORDER — LIDOCAINE HYDROCHLORIDE 20 MG/ML
SOLUTION OROPHARYNGEAL
Status: DISPENSED
Start: 2022-04-29

## (undated) RX ORDER — BUPIVACAINE HYDROCHLORIDE 2.5 MG/ML
INJECTION, SOLUTION EPIDURAL; INFILTRATION; INTRACAUDAL
Status: DISPENSED
Start: 2025-02-07

## (undated) RX ORDER — LIDOCAINE HYDROCHLORIDE 20 MG/ML
INJECTION, SOLUTION EPIDURAL; INFILTRATION; INTRACAUDAL; PERINEURAL
Status: DISPENSED
Start: 2022-01-05

## (undated) RX ORDER — PROPOFOL 10 MG/ML
INJECTION, EMULSION INTRAVENOUS
Status: DISPENSED
Start: 2022-01-05

## (undated) RX ORDER — LIDOCAINE HYDROCHLORIDE 10 MG/ML
INJECTION, SOLUTION EPIDURAL; INFILTRATION; INTRACAUDAL; PERINEURAL
Status: DISPENSED
Start: 2025-08-05

## (undated) RX ORDER — GLYCOPYRROLATE 0.2 MG/ML
INJECTION, SOLUTION INTRAMUSCULAR; INTRAVENOUS
Status: DISPENSED
Start: 2025-02-07

## (undated) RX ORDER — FENTANYL CITRATE 50 UG/ML
INJECTION, SOLUTION INTRAMUSCULAR; INTRAVENOUS
Status: DISPENSED
Start: 2025-02-07

## (undated) RX ORDER — PROPOFOL 10 MG/ML
INJECTION, EMULSION INTRAVENOUS
Status: DISPENSED
Start: 2025-02-07

## (undated) RX ORDER — DEXMEDETOMIDINE HYDROCHLORIDE 4 UG/ML
INJECTION, SOLUTION INTRAVENOUS
Status: DISPENSED
Start: 2025-02-07

## (undated) RX ORDER — KETOROLAC TROMETHAMINE 30 MG/ML
INJECTION, SOLUTION INTRAMUSCULAR; INTRAVENOUS
Status: DISPENSED
Start: 2024-12-24

## (undated) RX ORDER — ACETAMINOPHEN 325 MG/1
TABLET ORAL
Status: DISPENSED
Start: 2025-02-07

## (undated) RX ORDER — ONDANSETRON 2 MG/ML
INJECTION INTRAMUSCULAR; INTRAVENOUS
Status: DISPENSED
Start: 2022-01-05

## (undated) RX ORDER — DIPHENHYDRAMINE HCL 25 MG
CAPSULE ORAL
Status: DISPENSED
Start: 2022-04-29

## (undated) RX ORDER — LIDOCAINE HYDROCHLORIDE 10 MG/ML
INJECTION, SOLUTION EPIDURAL; INFILTRATION; INTRACAUDAL; PERINEURAL
Status: DISPENSED
Start: 2024-12-24

## (undated) RX ORDER — FENTANYL CITRATE 50 UG/ML
INJECTION, SOLUTION INTRAMUSCULAR; INTRAVENOUS
Status: DISPENSED
Start: 2024-12-24

## (undated) RX ORDER — METOCLOPRAMIDE 10 MG/1
TABLET ORAL
Status: DISPENSED
Start: 2022-04-29

## (undated) RX ORDER — DEXAMETHASONE SODIUM PHOSPHATE 4 MG/ML
INJECTION, SOLUTION INTRA-ARTICULAR; INTRALESIONAL; INTRAMUSCULAR; INTRAVENOUS; SOFT TISSUE
Status: DISPENSED
Start: 2025-02-07

## (undated) RX ORDER — KETOROLAC TROMETHAMINE 30 MG/ML
INJECTION, SOLUTION INTRAMUSCULAR; INTRAVENOUS
Status: DISPENSED
Start: 2025-05-09

## (undated) RX ORDER — ONDANSETRON 2 MG/ML
INJECTION INTRAMUSCULAR; INTRAVENOUS
Status: DISPENSED
Start: 2025-02-07

## (undated) RX ORDER — DEXAMETHASONE SODIUM PHOSPHATE 4 MG/ML
INJECTION, SOLUTION INTRA-ARTICULAR; INTRALESIONAL; INTRAMUSCULAR; INTRAVENOUS; SOFT TISSUE
Status: DISPENSED
Start: 2022-01-05

## (undated) RX ORDER — LIDOCAINE HYDROCHLORIDE AND EPINEPHRINE 10; 10 MG/ML; UG/ML
INJECTION, SOLUTION INFILTRATION; PERINEURAL
Status: DISPENSED
Start: 2025-08-05

## (undated) RX ORDER — ACETAMINOPHEN 325 MG/1
TABLET ORAL
Status: DISPENSED
Start: 2021-08-13

## (undated) RX ORDER — OXYCODONE HYDROCHLORIDE 5 MG/1
TABLET ORAL
Status: DISPENSED
Start: 2025-02-07

## (undated) RX ORDER — BUPIVACAINE HYDROCHLORIDE 2.5 MG/ML
INJECTION, SOLUTION INFILTRATION; PERINEURAL
Status: DISPENSED
Start: 2022-01-05

## (undated) RX ORDER — ONDANSETRON 4 MG/1
TABLET, ORALLY DISINTEGRATING ORAL
Status: DISPENSED
Start: 2022-04-29

## (undated) RX ORDER — CEFAZOLIN SODIUM/WATER 2 G/20 ML
SYRINGE (ML) INTRAVENOUS
Status: DISPENSED
Start: 2025-02-07

## (undated) RX ORDER — FENTANYL CITRATE 50 UG/ML
INJECTION, SOLUTION INTRAMUSCULAR; INTRAVENOUS
Status: DISPENSED
Start: 2022-01-05